# Patient Record
Sex: MALE | Race: BLACK OR AFRICAN AMERICAN | Employment: OTHER | URBAN - METROPOLITAN AREA
[De-identification: names, ages, dates, MRNs, and addresses within clinical notes are randomized per-mention and may not be internally consistent; named-entity substitution may affect disease eponyms.]

---

## 2017-08-02 ENCOUNTER — HOSPITAL ENCOUNTER (EMERGENCY)
Age: 57
Discharge: HOME | End: 2017-08-02
Attending: EMERGENCY MEDICINE
Payer: COMMERCIAL

## 2017-08-02 ENCOUNTER — APPOINTMENT (OUTPATIENT)
Dept: GENERAL RADIOLOGY | Age: 57
End: 2017-08-02
Attending: EMERGENCY MEDICINE
Payer: COMMERCIAL

## 2017-08-02 VITALS
BODY MASS INDEX: 33.86 KG/M2 | DIASTOLIC BLOOD PRESSURE: 88 MMHG | WEIGHT: 250 LBS | HEART RATE: 67 BPM | SYSTOLIC BLOOD PRESSURE: 177 MMHG | OXYGEN SATURATION: 98 % | HEIGHT: 72 IN | RESPIRATION RATE: 16 BRPM | TEMPERATURE: 97.9 F

## 2017-08-02 DIAGNOSIS — M54.41 ACUTE BILATERAL LOW BACK PAIN WITH BILATERAL SCIATICA: Primary | ICD-10-CM
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
## 2017-08-02 DIAGNOSIS — R03.0 ELEVATED BLOOD PRESSURE READING: ICD-10-CM

## 2017-08-02 DIAGNOSIS — M54.42 ACUTE BILATERAL LOW BACK PAIN WITH BILATERAL SCIATICA: Primary | ICD-10-CM

## 2017-08-02 PROCEDURE — 74011250636 HC RX REV CODE- 250/636

## 2017-08-02 PROCEDURE — 72100 X-RAY EXAM L-S SPINE 2/3 VWS: CPT

## 2017-08-02 PROCEDURE — 99283 EMERGENCY DEPT VISIT LOW MDM: CPT

## 2017-08-02 PROCEDURE — 96372 THER/PROPH/DIAG INJ SC/IM: CPT

## 2017-08-02 PROCEDURE — 74011250637 HC RX REV CODE- 250/637: Performed by: PHYSICIAN ASSISTANT

## 2017-08-02 RX ORDER — METHYLPREDNISOLONE 4 MG/1
TABLET ORAL
COMMUNITY
End: 2018-01-19

## 2017-08-02 RX ORDER — PREDNISONE 20 MG/1
60 TABLET ORAL DAILY
Qty: 9 TAB | Refills: 0 | Status: SHIPPED | OUTPATIENT
Start: 2017-08-02 | End: 2017-08-02

## 2017-08-02 RX ORDER — HYDROCODONE BITARTRATE AND ACETAMINOPHEN 5; 325 MG/1; MG/1
1 TABLET ORAL
Qty: 15 TAB | Refills: 0 | Status: SHIPPED | OUTPATIENT
Start: 2017-08-02 | End: 2018-01-19

## 2017-08-02 RX ORDER — DIAZEPAM 5 MG/1
5 TABLET ORAL
Status: COMPLETED | OUTPATIENT
Start: 2017-08-02 | End: 2017-08-02

## 2017-08-02 RX ORDER — DICLOFENAC SODIUM 30 MG/G
GEL TOPICAL 2 TIMES DAILY
Qty: 100 G | Refills: 0 | Status: SHIPPED | OUTPATIENT
Start: 2017-08-02 | End: 2018-01-19

## 2017-08-02 RX ORDER — ONDANSETRON 4 MG/1
4 TABLET, ORALLY DISINTEGRATING ORAL
Status: COMPLETED | OUTPATIENT
Start: 2017-08-02 | End: 2017-08-02

## 2017-08-02 RX ORDER — MORPHINE SULFATE 2 MG/ML
2 INJECTION, SOLUTION INTRAMUSCULAR; INTRAVENOUS
Status: COMPLETED | OUTPATIENT
Start: 2017-08-02 | End: 2017-08-02

## 2017-08-02 RX ORDER — POLYETHYLENE GLYCOL 3350 17 G/17G
17 POWDER, FOR SOLUTION ORAL DAILY
Qty: 119 G | Refills: 0 | Status: SHIPPED | OUTPATIENT
Start: 2017-08-02 | End: 2017-08-02

## 2017-08-02 RX ORDER — POLYETHYLENE GLYCOL 3350 17 G/17G
17 POWDER, FOR SOLUTION ORAL DAILY
Qty: 119 G | Refills: 0 | Status: SHIPPED | OUTPATIENT
Start: 2017-08-02 | End: 2017-08-05

## 2017-08-02 RX ORDER — METHOCARBAMOL 500 MG/1
1000 TABLET, FILM COATED ORAL 4 TIMES DAILY
COMMUNITY
End: 2018-01-19

## 2017-08-02 RX ORDER — MORPHINE SULFATE 2 MG/ML
INJECTION, SOLUTION INTRAMUSCULAR; INTRAVENOUS
Status: COMPLETED
Start: 2017-08-02 | End: 2017-08-02

## 2017-08-02 RX ORDER — PREDNISONE 20 MG/1
60 TABLET ORAL DAILY
Qty: 9 TAB | Refills: 0 | Status: SHIPPED | OUTPATIENT
Start: 2017-08-02 | End: 2017-08-05

## 2017-08-02 RX ADMIN — DIAZEPAM 5 MG: 5 TABLET ORAL at 11:06

## 2017-08-02 RX ADMIN — MORPHINE SULFATE 2 MG: 2 INJECTION, SOLUTION INTRAMUSCULAR; INTRAVENOUS at 11:06

## 2017-08-02 RX ADMIN — ONDANSETRON 4 MG: 4 TABLET, ORALLY DISINTEGRATING ORAL at 11:06
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
## 2017-08-02 NOTE — Clinical Note
Rest, ice/cool compresses several times daily x 2 days, then alternate ice/moist heat, gentle stretching, massage. Avoid prolonged sitting. Follow up with primary care for recheck. Contact information provided for Orthopedist if symptoms persist.  Ret urn to the Emergency Dept for any continued/worsening pain.

## 2018-01-16 ENCOUNTER — APPOINTMENT (OUTPATIENT)
Dept: GENERAL RADIOLOGY | Age: 58
DRG: 176 | End: 2018-01-16
Attending: EMERGENCY MEDICINE
Payer: COMMERCIAL

## 2018-01-16 ENCOUNTER — HOSPITAL ENCOUNTER (INPATIENT)
Age: 58
LOS: 3 days | Discharge: HOME | DRG: 176 | End: 2018-01-19
Attending: INTERNAL MEDICINE | Admitting: GENERAL ACUTE CARE HOSPITAL
Payer: COMMERCIAL

## 2018-01-16 ENCOUNTER — APPOINTMENT (OUTPATIENT)
Dept: CT IMAGING | Age: 58
DRG: 176 | End: 2018-01-16
Attending: EMERGENCY MEDICINE
Payer: COMMERCIAL

## 2018-01-16 DIAGNOSIS — I48.92 ATRIAL FLUTTER, UNSPECIFIED TYPE (HCC): ICD-10-CM

## 2018-01-16 DIAGNOSIS — I26.99 OTHER ACUTE PULMONARY EMBOLISM WITHOUT ACUTE COR PULMONALE (HCC): Primary | ICD-10-CM

## 2018-01-16 LAB
ALBUMIN SERPL-MCNC: 3.2 G/DL (ref 3.5–5)
ALBUMIN/GLOB SERPL: 0.8 {RATIO} (ref 1.1–2.2)
ALP SERPL-CCNC: 105 U/L (ref 45–117)
ALT SERPL-CCNC: 27 U/L (ref 12–78)
ANION GAP SERPL CALC-SCNC: 6 MMOL/L (ref 5–15)
AST SERPL-CCNC: 17 U/L (ref 15–37)
BASOPHILS # BLD: 0 K/UL (ref 0–0.1)
BASOPHILS NFR BLD: 0 % (ref 0–1)
BILIRUB SERPL-MCNC: 0.3 MG/DL (ref 0.2–1)
BNP SERPL-MCNC: 320 PG/ML (ref 0–125)
BUN SERPL-MCNC: 11 MG/DL (ref 6–20)
BUN/CREAT SERPL: 10 (ref 12–20)
CALCIUM SERPL-MCNC: 8.7 MG/DL (ref 8.5–10.1)
CHLORIDE SERPL-SCNC: 106 MMOL/L (ref 97–108)
CK SERPL-CCNC: 123 U/L (ref 39–308)
CO2 SERPL-SCNC: 28 MMOL/L (ref 21–32)
CREAT SERPL-MCNC: 1.07 MG/DL (ref 0.7–1.3)
D DIMER PPP FEU-MCNC: 2.3 MG/L FEU (ref 0–0.65)
EOSINOPHIL # BLD: 0.1 K/UL (ref 0–0.4)
EOSINOPHIL NFR BLD: 1 % (ref 0–7)
ERYTHROCYTE [DISTWIDTH] IN BLOOD BY AUTOMATED COUNT: 12.8 % (ref 11.5–14.5)
GLOBULIN SER CALC-MCNC: 4 G/DL (ref 2–4)
GLUCOSE SERPL-MCNC: 123 MG/DL (ref 65–100)
HCT VFR BLD AUTO: 42.8 % (ref 36.6–50.3)
HGB BLD-MCNC: 13.9 G/DL (ref 12.1–17)
INR PPP: 1 (ref 0.9–1.1)
LYMPHOCYTES # BLD: 2.7 K/UL (ref 0.8–3.5)
LYMPHOCYTES NFR BLD: 36 % (ref 12–49)
MAGNESIUM SERPL-MCNC: 1.9 MG/DL (ref 1.6–2.4)
MCH RBC QN AUTO: 28.6 PG (ref 26–34)
MCHC RBC AUTO-ENTMCNC: 32.5 G/DL (ref 30–36.5)
MCV RBC AUTO: 88.1 FL (ref 80–99)
MONOCYTES # BLD: 1 K/UL (ref 0–1)
MONOCYTES NFR BLD: 14 % (ref 5–13)
NEUTS SEG # BLD: 3.6 K/UL (ref 1.8–8)
NEUTS SEG NFR BLD: 49 % (ref 32–75)
PLATELET # BLD AUTO: 333 K/UL (ref 150–400)
POTASSIUM SERPL-SCNC: 3.8 MMOL/L (ref 3.5–5.1)
PROT SERPL-MCNC: 7.2 G/DL (ref 6.4–8.2)
PROTHROMBIN TIME: 10.1 SEC (ref 9–11.1)
RBC # BLD AUTO: 4.86 M/UL (ref 4.1–5.7)
SODIUM SERPL-SCNC: 140 MMOL/L (ref 136–145)
TROPONIN I SERPL-MCNC: <0.04 NG/ML
TSH SERPL DL<=0.05 MIU/L-ACNC: 1.21 UIU/ML (ref 0.36–3.74)
WBC # BLD AUTO: 7.3 K/UL (ref 4.1–11.1)

## 2018-01-16 PROCEDURE — 36415 COLL VENOUS BLD VENIPUNCTURE: CPT | Performed by: EMERGENCY MEDICINE

## 2018-01-16 PROCEDURE — 80053 COMPREHEN METABOLIC PANEL: CPT | Performed by: EMERGENCY MEDICINE

## 2018-01-16 PROCEDURE — 83735 ASSAY OF MAGNESIUM: CPT | Performed by: EMERGENCY MEDICINE

## 2018-01-16 PROCEDURE — 74011250636 HC RX REV CODE- 250/636: Performed by: EMERGENCY MEDICINE

## 2018-01-16 PROCEDURE — 82550 ASSAY OF CK (CPK): CPT | Performed by: EMERGENCY MEDICINE

## 2018-01-16 PROCEDURE — 99285 EMERGENCY DEPT VISIT HI MDM: CPT

## 2018-01-16 PROCEDURE — 84443 ASSAY THYROID STIM HORMONE: CPT | Performed by: EMERGENCY MEDICINE

## 2018-01-16 PROCEDURE — 65660000000 HC RM CCU STEPDOWN

## 2018-01-16 PROCEDURE — 85025 COMPLETE CBC W/AUTO DIFF WBC: CPT | Performed by: EMERGENCY MEDICINE

## 2018-01-16 PROCEDURE — 85610 PROTHROMBIN TIME: CPT | Performed by: EMERGENCY MEDICINE

## 2018-01-16 PROCEDURE — 74011636320 HC RX REV CODE- 636/320: Performed by: EMERGENCY MEDICINE

## 2018-01-16 PROCEDURE — 96372 THER/PROPH/DIAG INJ SC/IM: CPT

## 2018-01-16 PROCEDURE — 71275 CT ANGIOGRAPHY CHEST: CPT

## 2018-01-16 PROCEDURE — 93005 ELECTROCARDIOGRAM TRACING: CPT

## 2018-01-16 PROCEDURE — 71046 X-RAY EXAM CHEST 2 VIEWS: CPT

## 2018-01-16 PROCEDURE — 85379 FIBRIN DEGRADATION QUANT: CPT | Performed by: EMERGENCY MEDICINE

## 2018-01-16 PROCEDURE — 84484 ASSAY OF TROPONIN QUANT: CPT | Performed by: EMERGENCY MEDICINE

## 2018-01-16 PROCEDURE — 94761 N-INVAS EAR/PLS OXIMETRY MLT: CPT

## 2018-01-16 PROCEDURE — 83880 ASSAY OF NATRIURETIC PEPTIDE: CPT | Performed by: EMERGENCY MEDICINE

## 2018-01-16 RX ORDER — ENOXAPARIN SODIUM 100 MG/ML
1 INJECTION SUBCUTANEOUS EVERY 12 HOURS
Status: DISCONTINUED | OUTPATIENT
Start: 2018-01-17 | End: 2018-01-19 | Stop reason: HOSPADM

## 2018-01-16 RX ORDER — SODIUM CHLORIDE 0.9 % (FLUSH) 0.9 %
10 SYRINGE (ML) INJECTION
Status: COMPLETED | OUTPATIENT
Start: 2018-01-16 | End: 2018-01-16

## 2018-01-16 RX ORDER — SODIUM CHLORIDE 9 MG/ML
50 INJECTION, SOLUTION INTRAVENOUS
Status: COMPLETED | OUTPATIENT
Start: 2018-01-16 | End: 2018-01-16

## 2018-01-16 RX ORDER — ENOXAPARIN SODIUM 100 MG/ML
1 INJECTION SUBCUTANEOUS
Status: COMPLETED | OUTPATIENT
Start: 2018-01-16 | End: 2018-01-16

## 2018-01-16 RX ADMIN — IOPAMIDOL 80 ML: 755 INJECTION, SOLUTION INTRAVENOUS at 21:42

## 2018-01-16 RX ADMIN — Medication 10 ML: at 21:42

## 2018-01-16 RX ADMIN — ENOXAPARIN SODIUM 120 MG: 60 INJECTION SUBCUTANEOUS at 22:51

## 2018-01-16 RX ADMIN — SODIUM CHLORIDE 50 ML/HR: 900 INJECTION, SOLUTION INTRAVENOUS at 21:41

## 2018-01-16 NOTE — IP AVS SNAPSHOT
[address] [facility] 
[phone] Patient: Valerie Carroll. MRN: [id number] WHA:[birth year] A check maru indicates which time of day the medication should be taken. My Medications START taking these medications Instructions Each Dose to Equal  
 Morning Noon Evening Bedtime  
 apixaban 5 mg tablet Commonly known as:  Dulcy Legacy Your last dose was: Your next dose is:    
   
   
 2 tabs Every 12 Hours for 7 Days then 1 tab every 12 hours afterward  
     
   
   
   
  
 metoprolol tartrate 50 mg tablet Commonly known as:  LOPRESSOR Your last dose was: Your next dose is: Take 1 Tab by mouth every twelve (12) hours. 50 mg  
    
   
   
   
  
  
STOP taking these medications   
 diclofenac 3 % topical gel Commonly known as:  Kayli Phan HYDROcodone-acetaminophen 5-325 mg per tablet Commonly known as:  NORCO  
   
  
 methocarbamol 500 mg tablet Commonly known as:  ROBAXIN  
   
  
 methylPREDNISolone 4 mg tablet Commonly known as:  Glo Vincent Where to Get Your Medications Information on where to get these meds will be given to you by the nurse or doctor. ! Ask your nurse or doctor about these medications  
  apixaban 5 mg tablet  
 metoprolol tartrate 50 mg tablet

## 2018-01-16 NOTE — IP AVS SNAPSHOT
[address] [facility] 
[phone] Patient: Yamileth Akhtar. MRN: [id number] ULH:6[birth year]08/[birth year]76 About your hospitalization You were admitted on:  January 16, 2018 You last received care in the:  [facility] 2 PROGRESSIVE CARE You were discharged on:  January 19, 2018 Why you were hospitalized Your primary diagnosis was:  Not on File Your diagnoses also included:  Pulmonary Embolism (Hcc), Atrial Flutter (Hcc) Follow-up Information Follow up With Details Comments Contact Info Jaime Melendez MD On 2/19/2018 11;00am  Hematology/Oncology -  Pulmonary Embolism [address] [facility] [address] [facility] 
[phone] Hailey Sánchez NP On 1/31/2018 9;00am  hospital follow-up [address] [address] 
[phone] Danetet Zaman MD Schedule an appointment as soon as possible for a visit 1-2 weeks [address] [place] [address] 
[phone] Your Scheduled Appointments Wednesday January 31, 2018  9:00 AM EST TRANSITIONAL CARE MANAGEMENT with Hailey Sánchez NP [address]) 17[address] [facility] 7 [address] [phone] Thursday February 08, 2018  3:30 PM EST  
ESTABLISHED PATIENT with Danette Zaman MD  
[facility] [address] [place] [address]) [address]  
[phone] Monday February 19, 2018 11:00 AM EST New Patient with aJime Melendez MD  
8564 [facility] Oncology at [facility]) [address] [facility] [address] [facility]  
[phone] Discharge Orders None A check maru indicates which time of day the medication should be taken. My Medications START taking these medications  Instructions Each Dose to Equal  
 Morning Noon Evening Bedtime  
 apixaban 5 mg tablet Commonly known as:  Roney Blackburn Your last dose was: Your next dose is:    
   
   
 2 tabs Every 12 Hours for 7 Days then 1 tab every 12 hours afterward  
     
   
   
   
  
 metoprolol tartrate 50 mg tablet Commonly known as:  LOPRESSOR Your last dose was: Your next dose is: Take 1 Tab by mouth every twelve (12) hours. 50 mg  
    
   
   
   
  
  
STOP taking these medications   
 diclofenac 3 % topical gel Commonly known as:  Pastora Marques HYDROcodone-acetaminophen 5-325 mg per tablet Commonly known as:  NORCO  
   
  
 methocarbamol 500 mg tablet Commonly known as:  ROBAXIN  
   
  
 methylPREDNISolone 4 mg tablet Commonly known as:  Lieutenant Blank Where to Get Your Medications Information on where to get these meds will be given to you by the nurse or doctor. ! Ask your nurse or doctor about these medications  
  apixaban 5 mg tablet  
 metoprolol tartrate 50 mg tablet Discharge Instructions HOSPITALIST DISCHARGE INSTRUCTIONS 
 
NAME: Duane Gain. :  1960 MRN:  [id number] Date/Time:  2018 8:53 AM 
 
ADMIT DATE: 2018 DISCHARGE DATE: 2018 DISCHARGE DIAGNOSIS: 
Large Right Saddle PE New onset A. Flutter MEDICATIONS: 
· It is important that you take the medication exactly as they are prescribed. · Keep your medication in the bottles provided by the pharmacist and keep a list of the medication names, dosages, and times to be taken in your wallet. · Do not take other medications without consulting your doctor. Pain Management: per above medications What to do at [facility] Recommended diet:  Resume previous diet Recommended activity: Activity as tolerated If you have questions regarding the hospital related prescriptions or hospital related issues please call Howie Lynn at . If you experience any of the following symptoms then please call your primary care physician or return to the emergency room if you cannot get hold of your doctor: 
Fever, chills, nausea, vomiting, diarrhea, change in mentation, falling, bleeding, shortness of breath Information obtained by : 
I understand that if any problems occur once I am at home I am to contact my physician. I understand and acknowledge receipt of the instructions indicated above. Physician's or R.N.'s Signature                                                                  Date/Time Patient or Representative Signature                                                          Date/Time [employer] Announcement We are excited to announce that we are making your provider's discharge notes available to you in [employer]. You will see these notes when they are completed and signed by the physician that discharged you from your recent hospital stay. If you have any questions or concerns about any information you see in [employer], please call the Health Information Department where you were seen or reach out to your Primary Care Provider for more information about your plan of care. Introducing [facility]! Brooklynn Lyon introduces [employer] patient portal. Now you can access parts of your medical record, email your doctor's office, and request medication refills online. 1. In your internet browser, go to [email or web address] 2. Click on the First Time User? Click Here link in the Sign In box. You will see the New Member Sign Up page. 3. Enter your [employer] Access Code exactly as it appears below. You will not need to use this code after youve completed the sign-up process. If you do not sign up before the expiration date, you must request a new code. · [employer] Access Code: [email or web address] Expires: 4/19/2018  9:27 AM 
 
4. Enter the last four digits of your Social Security Number (xxxx) and Date of Birth (mm/dd/yyyy) as indicated and click Submit. You will be taken to the next sign-up page. 5. Create a [employer] ID. This will be your [employer] login ID and cannot be changed, so think of one that is secure and easy to remember. 6. Create a [employer] password. You can change your password at any time. 7. Enter your Password Reset Question and Answer. This can be used at a later time if you forget your password. 8. Enter your e-mail address. You will receive e-mail notification when new information is available in [address] 9. Click Sign Up. You can now view and download portions of your medical record. 10. Click the Download Summary menu link to download a portable copy of your medical information. If you have questions, please visit the Frequently Asked Questions section of the [employer] website. Remember, [employer] is NOT to be used for urgent needs. For medical emergencies, dial 911. Now available from your iPhone and Android! Providers Seen During Your Hospitalization Provider Specialty Primary office phone Mónica Nunez MD Internal Medicine [phone] Cleopatra Ulloa MD Internal Medicine [phone] Your Primary Care Physician (PCP) Primary Care Physician Office Phone Office Fax NONE ** None ** ** None ** You are allergic to the following Allergen Reactions Ibuprofen Other (comments) Increase B/P Recent Documentation Height Weight BMI Smoking Status 1.803 m 115.8 kg 35.61 kg/m2 Never Smoker Emergency Contacts Name Discharge Info Relation Home Work Mobile Jacques Orozco CAREGIVER [3] Spouse [3] [phone] Patient Belongings The following personal items are in your possession at time of discharge: 
  Dental Appliances: None  Visual Aid: None      Home Medications: None   Jewelry: None  Clothing: Footwear, Hat, Pants, Shirt    Other Valuables: [employer] Please provide this summary of care documentation to your next provider. Signatures-by signing, you are acknowledging that this After Visit Summary has been reviewed with you and you have received a copy. Patient Signature:  ____________________________________________________________ Date:  ____________________________________________________________  
  
Carlene Sleeper Provider Signature:  ____________________________________________________________ Date:  ____________________________________________________________

## 2018-01-17 ENCOUNTER — APPOINTMENT (OUTPATIENT)
Dept: ULTRASOUND IMAGING | Age: 58
DRG: 176 | End: 2018-01-17
Attending: GENERAL ACUTE CARE HOSPITAL
Payer: COMMERCIAL

## 2018-01-17 LAB
ALBUMIN SERPL-MCNC: 3.1 G/DL (ref 3.5–5)
ALBUMIN/GLOB SERPL: 0.8 {RATIO} (ref 1.1–2.2)
ALP SERPL-CCNC: 103 U/L (ref 45–117)
ALT SERPL-CCNC: 27 U/L (ref 12–78)
ANION GAP SERPL CALC-SCNC: 8 MMOL/L (ref 5–15)
AST SERPL-CCNC: 18 U/L (ref 15–37)
ATRIAL RATE: 109 BPM
BASOPHILS # BLD: 0 K/UL (ref 0–0.1)
BASOPHILS NFR BLD: 0 % (ref 0–1)
BILIRUB SERPL-MCNC: 0.5 MG/DL (ref 0.2–1)
BUN SERPL-MCNC: 10 MG/DL (ref 6–20)
BUN/CREAT SERPL: 9 (ref 12–20)
CALCIUM SERPL-MCNC: 9.3 MG/DL (ref 8.5–10.1)
CALCULATED R AXIS, ECG10: -81 DEGREES
CALCULATED T AXIS, ECG11: -56 DEGREES
CHLORIDE SERPL-SCNC: 105 MMOL/L (ref 97–108)
CO2 SERPL-SCNC: 26 MMOL/L (ref 21–32)
CREAT SERPL-MCNC: 1.13 MG/DL (ref 0.7–1.3)
DIAGNOSIS, 93000: NORMAL
EOSINOPHIL # BLD: 0.1 K/UL (ref 0–0.4)
EOSINOPHIL NFR BLD: 1 % (ref 0–7)
ERYTHROCYTE [DISTWIDTH] IN BLOOD BY AUTOMATED COUNT: 12.9 % (ref 11.5–14.5)
GLOBULIN SER CALC-MCNC: 4.1 G/DL (ref 2–4)
GLUCOSE SERPL-MCNC: 207 MG/DL (ref 65–100)
HCT VFR BLD AUTO: 42 % (ref 36.6–50.3)
HGB BLD-MCNC: 13.8 G/DL (ref 12.1–17)
LYMPHOCYTES # BLD: 3.1 K/UL (ref 0.8–3.5)
LYMPHOCYTES NFR BLD: 40 % (ref 12–49)
MCH RBC QN AUTO: 28.6 PG (ref 26–34)
MCHC RBC AUTO-ENTMCNC: 32.9 G/DL (ref 30–36.5)
MCV RBC AUTO: 87 FL (ref 80–99)
MONOCYTES # BLD: 0.9 K/UL (ref 0–1)
MONOCYTES NFR BLD: 11 % (ref 5–13)
NEUTS SEG # BLD: 3.8 K/UL (ref 1.8–8)
NEUTS SEG NFR BLD: 48 % (ref 32–75)
P-R INTERVAL, ECG05: 186 MS
PLATELET # BLD AUTO: 353 K/UL (ref 150–400)
POTASSIUM SERPL-SCNC: 3.8 MMOL/L (ref 3.5–5.1)
PROT SERPL-MCNC: 7.2 G/DL (ref 6.4–8.2)
Q-T INTERVAL, ECG07: 344 MS
QRS DURATION, ECG06: 132 MS
QTC CALCULATION (BEZET), ECG08: 463 MS
RBC # BLD AUTO: 4.83 M/UL (ref 4.1–5.7)
SODIUM SERPL-SCNC: 139 MMOL/L (ref 136–145)
TROPONIN I SERPL-MCNC: 0.04 NG/ML
VENTRICULAR RATE, ECG03: 109 BPM
WBC # BLD AUTO: 8 K/UL (ref 4.1–11.1)

## 2018-01-17 PROCEDURE — 85025 COMPLETE CBC W/AUTO DIFF WBC: CPT | Performed by: GENERAL ACUTE CARE HOSPITAL

## 2018-01-17 PROCEDURE — 80053 COMPREHEN METABOLIC PANEL: CPT | Performed by: GENERAL ACUTE CARE HOSPITAL

## 2018-01-17 PROCEDURE — 36415 COLL VENOUS BLD VENIPUNCTURE: CPT | Performed by: GENERAL ACUTE CARE HOSPITAL

## 2018-01-17 PROCEDURE — 74011250636 HC RX REV CODE- 250/636: Performed by: GENERAL ACUTE CARE HOSPITAL

## 2018-01-17 PROCEDURE — 65660000000 HC RM CCU STEPDOWN

## 2018-01-17 PROCEDURE — 93970 EXTREMITY STUDY: CPT

## 2018-01-17 PROCEDURE — 84484 ASSAY OF TROPONIN QUANT: CPT | Performed by: GENERAL ACUTE CARE HOSPITAL

## 2018-01-17 RX ORDER — SODIUM CHLORIDE 0.9 % (FLUSH) 0.9 %
5-10 SYRINGE (ML) INJECTION AS NEEDED
Status: DISCONTINUED | OUTPATIENT
Start: 2018-01-17 | End: 2018-01-19 | Stop reason: HOSPADM

## 2018-01-17 RX ORDER — SODIUM CHLORIDE 0.9 % (FLUSH) 0.9 %
5-10 SYRINGE (ML) INJECTION EVERY 8 HOURS
Status: DISCONTINUED | OUTPATIENT
Start: 2018-01-17 | End: 2018-01-19 | Stop reason: HOSPADM

## 2018-01-17 RX ADMIN — Medication 10 ML: at 21:05

## 2018-01-17 RX ADMIN — ENOXAPARIN SODIUM 120 MG: 60 INJECTION SUBCUTANEOUS at 13:23

## 2018-01-17 RX ADMIN — ENOXAPARIN SODIUM 120 MG: 60 INJECTION SUBCUTANEOUS at 21:02

## 2018-01-17 RX ADMIN — Medication 10 ML: at 07:48

## 2018-01-17 RX ADMIN — Medication 10 ML: at 14:00

## 2018-01-17 NOTE — PROGRESS NOTES
Hospitalist Progress Note    NAME: Valentín Curtis. :  1960   MRN:  [id number]     Assessment / Plan:  Large Right Saddle PE  ? Provoked. Pt reported accident 5 months ago and was in rehab for 3 months and claims to be more mobile for past couple of months. He noted legs swelling few months ago  Continue Therapeutic Lovenox  Ask CM consult for Eliquis pricing  I have discussed pros and cons for various anticoagulations  Heme consult pending  Awaiting 2D echo  LE Venous dopplers negative    New onset A. Flutter  In settings of PE  Continue tele monitoring  Currently rate controlled  May need OP cardiology consult  On anticoagulation as above  Awaiting 2D echo  TSH ok    Code Status: Full Code  Surrogate Decision Maker: Wife  DVT Prophylaxis: Therapeutic Lovenox  GI Prophylaxis: not indicated  Baseline: Independent                              Subjective: Pt seen and examined at bedside. NAD, Feels the same. Overnight events d/w RN     CHIEF COMPLAINT: f/u \"ACOSTA\"     Review of Systems:  Symptom Y/N Comments  Symptom Y/N Comments   Fever/Chills n   Chest Pain y pleuritic   Poor Appetite    Edema     Cough n   Abdominal Pain n    Sputum    Joint Pain     SOB/ACOSTA y   Pruritis/Rash     Nausea/vomit    Tolerating PT/OT     Diarrhea    Tolerating Diet y    Constipation    Other       Could NOT obtain due to:      Objective:     VITALS:   Last 24hrs VS reviewed since prior progress note.  Most recent are:  Patient Vitals for the past 24 hrs:   Temp Pulse Resp BP SpO2   18 1501 98.3 °F (36.8 °C) 84 24 (!) 154/99 98 %   18 1400 - (!) 104 27 129/89 97 %   18 1300 - (!) 109 27 144/82 97 %   18 1200 - 88 21 141/89 97 %   18 1100 - (!) 109 25 147/87 97 %   18 1030 - 93 28 (!) 130/98 96 %   18 1000 - (!) 104 24 140/87 95 %   18 0900 - (!) 115 25 138/86 98 %   18 0830 - 94 22 (!) 138/97 98 %   18 0800 - 92 22 127/90 97 %   18 0530 - 92 19 (!) 132/98 97 % 01/17/18 0500 - 91 19 (!) 134/96 96 %   01/17/18 0430 - 90 19 (!) 137/93 97 %   01/17/18 0400 - 99 23 (!) 135/103 96 %   01/17/18 0330 - 96 22 (!) 139/97 97 %   01/17/18 0300 - 92 20 (!) 143/96 96 %   01/17/18 0230 - 91 19 (!) 144/93 97 %   01/17/18 0200 - 91 24 (!) 162/100 96 %   01/17/18 0130 - 91 24 (!) 157/98 97 %   01/17/18 0100 - 97 26 (!) 149/103 98 %   01/17/18 0030 - 96 25 (!) 141/94 96 %   01/17/18 0001 - 90 22 (!) 161/116 98 %   01/16/18 2330 - 93 20 (!) 176/119 99 %   01/16/18 2300 - 91 20 (!) 161/101 97 %   01/16/18 2230 - 90 28 (!) 172/120 98 %   01/16/18 2200 - 90 26 (!) 151/94 99 %   01/16/18 2156 - - - (!) 145/108 -   01/16/18 2000 - 90 18 130/81 97 %   01/16/18 1930 - 96 20 135/88 97 %   01/16/18 1904 - (!) 107 29 - 99 %   01/16/18 1903 98.4 °F (36.9 °C) (!) 107 16 (!) 163/93 100 %   01/16/18 1901 - - - (!) 163/93 -       Intake/Output Summary (Last 24 hours) at 01/17/18 1633  Last data filed at 01/17/18 1300   Gross per 24 hour   Intake                0 ml   Output              500 ml   Net             -500 ml        PHYSICAL EXAM:  General: WD, WN. Alert, cooperative, no acute distress    EENT:  EOMI. Anicteric sclerae. MMM  Resp:  CTA bilaterally, no wheezing or rales. No accessory muscle use  CV:  Regular  rhythm,  No edema  GI:  Soft, Non distended, Non tender.  +Bowel sounds  Neurologic:  Alert and oriented X 3, normal speech,   Psych:   Good insight. Not anxious nor agitated  Skin:  No rashes.   No jaundice    Reviewed most current lab test results and cultures  YES  Reviewed most current radiology test results   YES  Review and summation of old records today    NO  Reviewed patient's current orders and MAR    YES  PMH/SH reviewed - no change compared to H&P  ________________________________________________________________________  Care Plan discussed with:    Comments   Patient y    Family  y At bedside   RN y    Care Manager     Consultant                        Multidiciplinary team rounds were held today with , nursing, pharmacist and clinical coordinator. Patient's plan of care was discussed; medications were reviewed and discharge planning was addressed. ________________________________________________________________________  Total NON critical care TIME:  35 Minutes    Total CRITICAL CARE TIME Spent:   Minutes non procedure based      Comments   >50% of visit spent in counseling and coordination of care     ________________________________________________________________________  Ani Hernandez MD     Procedures: see electronic medical records for all procedures/Xrays and details which were not copied into this note but were reviewed prior to creation of Plan. LABS:  I reviewed today's most current labs and imaging studies.   Pertinent labs include:  Recent Labs      01/17/18   0351  01/16/18   1944   WBC  8.0  7.3   HGB  13.8  13.9   HCT  42.0  42.8   PLT  353  333     Recent Labs      01/17/18   0351  01/16/18   1944   NA  139  140   K  3.8  3.8   CL  105  106   CO2  26  28   GLU  207*  123*   BUN  10  11   CREA  1.13  1.07   CA  9.3  8.7   MG   --   1.9   ALB  3.1*  3.2*   TBILI  0.5  0.3   SGOT  18  17   ALT  27  27   INR   --   1.0       Signed: Ani Hernandez MD
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
## 2018-01-17 NOTE — PROGRESS NOTES
Problem: Falls - Risk of  Goal: *Absence of Falls  Document Sandra Fall Risk and appropriate interventions in the flowsheet.    Outcome: Progressing Towards Goal  Fall Risk Interventions:            Medication Interventions: Evaluate medications/consider consulting pharmacy, Patient to call before getting OOB

## 2018-01-17 NOTE — ED NOTES
Patient resting in bed in position of comfort with call bell in reach, family at bedside. No complaints at this time.

## 2018-01-17 NOTE — ROUTINE PROCESS
TRANSFER - OUT REPORT:    Verbal report given to [employer]. on Kai Angel.  being transferred to [facility](unit) for routine progression of care       Report consisted of patients Situation, Background, Assessment and   Recommendations(SBAR). Information from the following report(s) SBAR, Kardex, ED Summary and MAR was reviewed with the receiving nurse. Lines:   Peripheral IV 01/16/18 Left Hand (Active)   Site Assessment Clean, dry, & intact 1/16/2018  7:52 PM   Phlebitis Assessment 0 1/16/2018  7:52 PM   Infiltration Assessment 0 1/16/2018  7:52 PM   Dressing Status Clean, dry, & intact 1/16/2018  7:52 PM       Peripheral IV 01/16/18 Right Antecubital (Active)   Site Assessment Clean, dry, & intact 1/16/2018  9:02 PM   Phlebitis Assessment 0 1/16/2018  9:02 PM   Infiltration Assessment 0 1/16/2018  9:02 PM   Dressing Status Clean, dry, & intact 1/16/2018  9:02 PM        Opportunity for questions and clarification was provided.       Patient transported with:   Monitor
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
## 2018-01-17 NOTE — PROGRESS NOTES
TRANSFER - IN REPORT:    Verbal report received from Joni Williamson, [facility] (name) on Yamileth Button.  being received from ED (unit) for routine progression of care      Report consisted of patients Situation, Background, Assessment and   Recommendations(SBAR). Information from the following report(s) SBAR, Kardex, [facility] and Recent Results was reviewed with the receiving nurse. Opportunity for questions and clarification was provided. Assessment completed upon patients arrival to unit and care assumed.            Primary Nurse Deysi Hunt RN and Coralee Oppenheim, RN performed a dual skin assessment on this patient No impairment noted  Dilshad score is 23

## 2018-01-17 NOTE — PROGRESS NOTES
Pharmacy  Enoxaparin (Lovenox®) Monitoring/Dosing      Indication: PE     Current Dose: Enoxaparin 120 gm subcutaneously every 12 hours    Creatinine Clearance (mL/min): 80    Current Weight: 113    Labs:  Recent Labs      01/16/18   1944   CREA  1.07   HGB  13.9   PLT  333   INR  1.0     Wt Readings from Last 1 Encounters:   01/16/18 113.4 kg (250 lb)     Ht Readings from Last 1 Encounters:   01/16/18 180.3 cm (71\")       Impression/Plan:   Lovenox 120 mg sc every 12 hr appropriate for PE     Thanks,  Cathi Carrion, PHARMD      [email or web address]/Enoxaparin%20Dose%20Adjustment%20Protocol. pdf

## 2018-01-17 NOTE — ED PROVIDER NOTES
EMERGENCY DEPARTMENT HISTORY AND PHYSICAL EXAM      Date: 1/16/2018  Patient Name: Yamileth Akhtar. History of Presenting Illness     Chief Complaint   Patient presents with    Irregular Heart Beat     reports chest congestion and cough x 4 days. [facility] and was [facility] here d/t new onset A fib/flutter       History Provided By: Patient and EMS    HPI: Yamileth Riddle, 61 y.o. male with PMHx significant for hyperlipidemia / obesity, presents via EMS to the ED after being referred to the ED from [facility] for new onset of atrial flutter. Pt presents with cc of persistent congestion and non-productive cough that has ongoing for the last few days. He complains of an associated episode of SOB that occurred a few days ago while on a walk. EMS reports atrial flutter en route as well. Pt denies any history of longstanding disease or daily medications. Pt endorses taking Tylenol and OTC cough syrup for his symptoms with mild relief. Pt denies any history of evaluation by a cardiologist. He specifically denies leg pain, leg swelling, fever, chills, nausea, vomiting, or CP. PCP: None    There are no other complaints, changes, or physical findings at this time. Current Facility-Administered Medications   Medication Dose Route Frequency Provider Last Rate Last Dose    [START ON 1/17/2018] enoxaparin (LOVENOX) injection 120 mg  1 mg/kg SubCUTAneous Q12H Korin Ram MD         Current Outpatient Prescriptions   Medication Sig Dispense Refill    methocarbamol (ROBAXIN) 500 mg tablet Take 1,000 mg by mouth four (4) times daily.  methylPREDNISolone (MEDROL DOSEPACK) 4 mg tablet Take  by mouth Specific Days and Specific Times.  HYDROcodone-acetaminophen (NORCO) 5-325 mg per tablet Take 1 Tab by mouth every six (6) hours as needed for Pain for up to 15 doses. Max Daily Amount: 4 Tabs. 15 Tab 0    diclofenac (SOLARAZE) 3 % topical gel Apply  to affected area two (2) times a day.  100 g 0 Past History     Past Medical History:  Past Medical History:   Diagnosis Date    Cerumen impaction 10/14/2013    Elevated blood pressure 10/14/2013    Hyperlipidemia with target LDL less than 100 11/26/2013    Obesity, unspecified 10/10/2013    Pulmonary embolism ([facility] [address] 1/16/2018       Past Surgical History:  No past surgical history on file. Family History:  Family History   Problem Relation Age of Onset    Hypertension Mother     Hypertension Father        Social History:  Social History   Substance Use Topics    Smoking status: Never Smoker    Smokeless tobacco: Never Used    Alcohol use No       Allergies: Allergies   Allergen Reactions    Ibuprofen Other (comments)     Increase B/P         Review of Systems   Review of Systems   Constitutional: Negative for chills and fever. HENT: Positive for congestion. Negative for sore throat. Eyes: Negative for visual disturbance. Respiratory: Positive for cough and shortness of breath. Cardiovascular: Negative for chest pain and leg swelling. Gastrointestinal: Negative for abdominal pain, blood in stool, diarrhea and nausea. Endocrine: Negative for polyuria. Genitourinary: Negative for dysuria and testicular pain. Musculoskeletal: Negative for arthralgias, joint swelling and myalgias. Skin: Negative for rash. Allergic/Immunologic: Negative for immunocompromised state. Neurological: Negative for weakness and headaches. Hematological: Does not bruise/bleed easily. Psychiatric/Behavioral: Negative for confusion. Physical Exam   Physical Exam   Constitutional: He is oriented to person, place, and time. He appears well-developed and well-nourished. HENT:   Head: Normocephalic and atraumatic. Moist mucous membranes   Eyes: Conjunctivae are normal. Pupils are equal, round, and reactive to light. Right eye exhibits no discharge. Left eye exhibits no discharge. Neck: Normal range of motion. Neck supple.  No tracheal deviation present. Cardiovascular: Normal heart sounds. No murmur heard. Irregularly irregular rate   Pulmonary/Chest: Effort normal and breath sounds normal. No respiratory distress. He has no wheezes. He has no rales. Abdominal: Soft. Bowel sounds are normal. There is no tenderness. There is no rebound and no guarding. Musculoskeletal: Normal range of motion. He exhibits no tenderness or deformity. Trace edema of bilateral lower extremities   Neurological: He is alert and oriented to person, place, and time. Skin: Skin is warm and dry. No rash noted. No erythema. Psychiatric: His behavior is normal.   Nursing note and vitals reviewed. Diagnostic Study Results     Labs -     Recent Results (from the past 12 hour(s))   EKG, 12 LEAD, INITIAL    Collection Time: 01/16/18  6:56 PM   Result Value Ref Range    Ventricular Rate 109 BPM    Atrial Rate 109 BPM    P-R Interval 186 ms    QRS Duration 132 ms    Q-T Interval 344 ms    QTC Calculation (Bezet) 463 ms    Calculated R Axis -81 degrees    Calculated T Axis -56 degrees    Diagnosis       Sinus tachycardia  Left axis deviation  Nonspecific intraventricular block  T wave abnormality, consider inferior ischemia  No previous ECGs available     CBC WITH AUTOMATED DIFF    Collection Time: 01/16/18  7:44 PM   Result Value Ref Range    WBC 7.3 4.1 - 11.1 K/uL    RBC 4.86 4.10 - 5.70 M/uL    HGB 13.9 12.1 - 17.0 g/dL    HCT 42.8 36.6 - 50.3 %    MCV 88.1 80.0 - 99.0 FL    MCH 28.6 26.0 - 34.0 PG    MCHC 32.5 30.0 - 36.5 g/dL    RDW 12.8 11.5 - 14.5 %    PLATELET 137 862 - 551 K/uL    NEUTROPHILS 49 32 - 75 %    LYMPHOCYTES 36 12 - 49 %    MONOCYTES 14 (H) 5 - 13 %    EOSINOPHILS 1 0 - 7 %    BASOPHILS 0 0 - 1 %    ABS. NEUTROPHILS 3.6 1.8 - 8.0 K/UL    ABS. LYMPHOCYTES 2.7 0.8 - 3.5 K/UL    ABS. MONOCYTES 1.0 0.0 - 1.0 K/UL    ABS. EOSINOPHILS 0.1 0.0 - 0.4 K/UL    ABS.  BASOPHILS 0.0 0.0 - 0.1 K/UL   METABOLIC PANEL, COMPREHENSIVE    Collection Time: 01/16/18  7:44 PM   Result Value Ref Range    Sodium 140 136 - 145 mmol/L    Potassium 3.8 3.5 - 5.1 mmol/L    Chloride 106 97 - 108 mmol/L    CO2 28 21 - 32 mmol/L    Anion gap 6 5 - 15 mmol/L    Glucose 123 (H) 65 - 100 mg/dL    BUN 11 6 - 20 MG/DL    Creatinine 1.07 0.70 - 1.30 MG/DL    BUN/Creatinine ratio 10 (L) 12 - 20      GFR est AA >60 >60 ml/min/1.73m2    GFR est non-AA >60 >60 ml/min/1.73m2    Calcium 8.7 8.5 - 10.1 MG/DL    Bilirubin, total 0.3 0.2 - 1.0 MG/DL    ALT (SGPT) 27 12 - 78 U/L    AST (SGOT) 17 15 - 37 U/L    Alk. phosphatase 105 45 - 117 U/L    Protein, total 7.2 6.4 - 8.2 g/dL    Albumin 3.2 (L) 3.5 - 5.0 g/dL    Globulin 4.0 2.0 - 4.0 g/dL    A-G Ratio 0.8 (L) 1.1 - 2.2     CK W/ REFLX CKMB    Collection Time: 01/16/18  7:44 PM   Result Value Ref Range     39 - 308 U/L   TROPONIN I    Collection Time: 01/16/18  7:44 PM   Result Value Ref Range    Troponin-I, Qt. <0.04 <0.05 ng/mL   PROTHROMBIN TIME + INR    Collection Time: 01/16/18  7:44 PM   Result Value Ref Range    INR 1.0 0.9 - 1.1      Prothrombin time 10.1 9.0 - 11.1 sec   D DIMER    Collection Time: 01/16/18  7:44 PM   Result Value Ref Range    D-dimer 2.30 (H) 0.00 - 0.65 mg/L FEU   MAGNESIUM    Collection Time: 01/16/18  7:44 PM   Result Value Ref Range    Magnesium 1.9 1.6 - 2.4 mg/dL   NT-PRO BNP    Collection Time: 01/16/18  7:44 PM   Result Value Ref Range    NT pro- (H) 0 - 125 PG/ML   TSH 3RD GENERATION    Collection Time: 01/16/18  7:44 PM   Result Value Ref Range    TSH 1.21 0.36 - 3.74 uIU/mL       Radiologic Studies -   CTA CHEST W OR W WO CONT   Final Result      XR CHEST PA LAT   Final Result      DUPLEX LOWER EXT VENOUS BILAT    (Results Pending)     CT Results  (Last 48 hours)               01/16/18 2141  CTA CHEST W OR W WO CONT Final result    Impression:  IMPRESSION: Large saddle type embolism in the right pulmonary artery. No   evidence for peripheral infarction at this time.  This result was relayed to Dr. Mary Jaime by myself at 2157 hours   789               Narrative:  EXAM:  CTA CHEST W OR W WO CONT       INDICATION:   Chest pain, acute, pulmonary embolism (PE) suspected. Chest   congestion and cough for 4 days. New onset atrial fibrillation and flutter. COMPARISON: None. CONTRAST:  80 mL of Isovue-370. TECHNIQUE:    Precontrast  images were obtained to localize the volume for acquisition. Multislice helical CT arteriography was performed from the diaphragm to the   thoracic inlet during uneventful rapid bolus intravenous contrast   administration. Lung and soft tissue windows were generated. Coronal and   sagittal images were generated and 3D post processing consisting of coronal   maximum intensity images was performed. CT dose reduction was achieved through   use of a standardized protocol tailored for this examination and automatic   exposure control for dose modulation. FINDINGS:   The lungs are clear of mass, nodule, airspace disease or edema. There is a large right pulmonary embolism extending into the right lower lobe,   right upper lobe, and to a lesser degree of right middle lobe. There is no mediastinal or hilar adenopathy or mass. The aorta enhances normally   without evidence of aneurysm or dissection. The visualized portions of the upper abdominal organs are normal.               CXR Results  (Last 48 hours)               01/16/18 2022  XR CHEST PA LAT Final result    Impression:  IMPRESSION: No overt CHF. Narrative:  EXAM:  XR CHEST PA LAT       INDICATION:   atrial flutter       COMPARISON: None. FINDINGS: PA and lateral radiographs of the chest demonstrate clear lungs. The   cardiac and mediastinal contours and pulmonary vascularity are remarkable for   mild tortuosity of the descending aorta. The bones and soft tissues are within   normal limits. Cardiac leads are in place.                    Medical Decision Making   I am the first provider for this patient. I reviewed the vital signs, available nursing notes, past medical history, past surgical history, family history and social history. Vital Signs-Reviewed the patient's vital signs. Patient Vitals for the past 12 hrs:   Temp Pulse Resp BP SpO2   01/16/18 2000 - 90 18 130/81 97 %   01/16/18 1930 - 96 20 135/88 97 %   01/16/18 1904 - (!) 107 29 - 99 %   01/16/18 1903 98.4 °F (36.9 °C) (!) 107 16 (!) 163/93 100 %   01/16/18 1901 - - - (!) 163/93 -       Pulse Oximetry Analysis - 99% on RA    EKG interpretation: (Preliminary)  6:56 PM  Rhythm: atrial flutter; and regular . Rate (approx.): 109; Axis: normal; [place] interval: normal; QRS interval: normal ; ST/T wave: lateral ST changes. Written by Overton Ganser, ED Scribe, as dictated by Swapnil Callejas DO. Records Reviewed: Nursing Notes, Old Medical Records and Previous electrocardiograms    Provider Notes (Medical Decision Making):   EKG from outside facility shows atrial flutter with variable conduction and ST segment changes, might be consistent with LVH. Atrial flutter DDx includes electrolyte abnormality, infection, PE, hypertensive heart disease. ED Course:   Initial assessment performed. The patients presenting problems have been discussed, and they are in agreement with the care plan formulated and outlined with them. I have encouraged them to ask questions as they arise throughout their visit. CONSULT NOTE:   9:59 PM  Swapnil Callejas DO spoke with Austyn Urena MD,  Specialty: Radiology  Conveyed CT results, shows large pulmonary embolus extending into all four lobes. Written by Overton Ganser, ED Scribe, as dictated by Swapnil Callejas DO. Progress Note:  10:05 PM  Pt updated on all available results, he agrees with the current plan of care.    Written by SHANITA Adair, as dictated by Swapnil Callejas DO.    CONSULT NOTE:   10:24 PM  Swapnil Callejas DO spoke with Molly Alfaro MD,   Specialty: Hospitalist  Discussed pt's hx, disposition, and available diagnostic and imaging results. Reviewed care plans. Consultant will evaluate pt for admission. Written by SHANITA Hendersonibxavier, as dictated by Bob Contreras DO. Disposition:  10:24 PM  Patient is being admitted to the hospital by Dr. Kali Green. The results of their tests and reasons for their admission have been discussed with them and/or available family. They convey agreement and understanding for the need to be admitted and for their admission diagnosis. Consultation has been made with the inpatient physician specialist for hospitalization. Written by SHANITA Hendersone, as dictated by Bob Contreras DO. PLAN: Admit to Hospitalist    Diagnosis     Clinical Impression:   1. Other acute pulmonary embolism without acute cor pulmonale ([facility] [address]    2. Atrial flutter, unspecified type [facility])        Attestations: This note is prepared by Maksim Brooks, acting as Scribe for Bob Contreras DO. The scribe's documentation has been prepared under my direction and personally reviewed by me in its entirety. I confirm that the note above accurately reflects all work, treatment, procedures, and medical decision making performed by me.   Bob Contreras DO

## 2018-01-17 NOTE — ED NOTES
Care assumed after report from night shift. Pt resting quietly watching tv. No new complaints. Awaiting admission bed. No s/s resp distress or discomfort. Will continue to monitor.

## 2018-01-18 PROBLEM — I48.92 ATRIAL FLUTTER (HCC): Status: ACTIVE | Noted: 2018-01-18

## 2018-01-18 LAB
ANION GAP SERPL CALC-SCNC: 6 MMOL/L (ref 5–15)
BUN SERPL-MCNC: 11 MG/DL (ref 6–20)
BUN/CREAT SERPL: 10 (ref 12–20)
CALCIUM SERPL-MCNC: 9.6 MG/DL (ref 8.5–10.1)
CHLORIDE SERPL-SCNC: 103 MMOL/L (ref 97–108)
CHOLEST SERPL-MCNC: 200 MG/DL
CO2 SERPL-SCNC: 29 MMOL/L (ref 21–32)
CREAT SERPL-MCNC: 1.07 MG/DL (ref 0.7–1.3)
ERYTHROCYTE [DISTWIDTH] IN BLOOD BY AUTOMATED COUNT: 12.8 % (ref 11.5–14.5)
GLUCOSE SERPL-MCNC: 121 MG/DL (ref 65–100)
HCT VFR BLD AUTO: 45.9 % (ref 36.6–50.3)
HDLC SERPL-MCNC: 54 MG/DL
HDLC SERPL: 3.7 {RATIO} (ref 0–5)
HGB BLD-MCNC: 15.3 G/DL (ref 12.1–17)
LDLC SERPL CALC-MCNC: 125.4 MG/DL (ref 0–100)
LIPID PROFILE,FLP: ABNORMAL
MCH RBC QN AUTO: 29.9 PG (ref 26–34)
MCHC RBC AUTO-ENTMCNC: 33.3 G/DL (ref 30–36.5)
MCV RBC AUTO: 89.6 FL (ref 80–99)
PLATELET # BLD AUTO: 381 K/UL (ref 150–400)
POTASSIUM SERPL-SCNC: 4.1 MMOL/L (ref 3.5–5.1)
RBC # BLD AUTO: 5.12 M/UL (ref 4.1–5.7)
SODIUM SERPL-SCNC: 138 MMOL/L (ref 136–145)
TRIGL SERPL-MCNC: 103 MG/DL (ref ?–150)
VLDLC SERPL CALC-MCNC: 20.6 MG/DL
WBC # BLD AUTO: 9.1 K/UL (ref 4.1–11.1)

## 2018-01-18 PROCEDURE — 85027 COMPLETE CBC AUTOMATED: CPT | Performed by: INTERNAL MEDICINE

## 2018-01-18 PROCEDURE — 36415 COLL VENOUS BLD VENIPUNCTURE: CPT | Performed by: GENERAL ACUTE CARE HOSPITAL

## 2018-01-18 PROCEDURE — 74011250636 HC RX REV CODE- 250/636: Performed by: GENERAL ACUTE CARE HOSPITAL

## 2018-01-18 PROCEDURE — 74011250637 HC RX REV CODE- 250/637: Performed by: NURSE PRACTITIONER

## 2018-01-18 PROCEDURE — 74011250637 HC RX REV CODE- 250/637: Performed by: INTERNAL MEDICINE

## 2018-01-18 PROCEDURE — 93306 TTE W/DOPPLER COMPLETE: CPT

## 2018-01-18 PROCEDURE — 80061 LIPID PANEL: CPT | Performed by: GENERAL ACUTE CARE HOSPITAL

## 2018-01-18 PROCEDURE — 74011000250 HC RX REV CODE- 250: Performed by: INTERNAL MEDICINE

## 2018-01-18 PROCEDURE — 80048 BASIC METABOLIC PNL TOTAL CA: CPT | Performed by: INTERNAL MEDICINE

## 2018-01-18 PROCEDURE — 65660000000 HC RM CCU STEPDOWN

## 2018-01-18 RX ORDER — CARVEDILOL 6.25 MG/1
6.25 TABLET ORAL 2 TIMES DAILY WITH MEALS
Status: DISCONTINUED | OUTPATIENT
Start: 2018-01-18 | End: 2018-01-18

## 2018-01-18 RX ORDER — DILTIAZEM HYDROCHLORIDE 5 MG/ML
10 INJECTION INTRAVENOUS ONCE
Status: COMPLETED | OUTPATIENT
Start: 2018-01-18 | End: 2018-01-18

## 2018-01-18 RX ORDER — CARVEDILOL 3.12 MG/1
3.12 TABLET ORAL 2 TIMES DAILY WITH MEALS
Status: DISCONTINUED | OUTPATIENT
Start: 2018-01-18 | End: 2018-01-18

## 2018-01-18 RX ORDER — METOPROLOL TARTRATE 25 MG/1
25 TABLET, FILM COATED ORAL EVERY 12 HOURS
Status: DISCONTINUED | OUTPATIENT
Start: 2018-01-18 | End: 2018-01-19

## 2018-01-18 RX ADMIN — DILTIAZEM HYDROCHLORIDE 10 MG: 5 INJECTION INTRAVENOUS at 18:49

## 2018-01-18 RX ADMIN — CARVEDILOL 3.12 MG: 3.12 TABLET, FILM COATED ORAL at 10:09

## 2018-01-18 RX ADMIN — METOPROLOL TARTRATE 25 MG: 25 TABLET ORAL at 19:36

## 2018-01-18 RX ADMIN — ENOXAPARIN SODIUM 120 MG: 60 INJECTION SUBCUTANEOUS at 10:09

## 2018-01-18 RX ADMIN — Medication 10 ML: at 06:56

## 2018-01-18 RX ADMIN — Medication 10 ML: at 22:13

## 2018-01-18 RX ADMIN — CARVEDILOL 6.25 MG: 6.25 TABLET, FILM COATED ORAL at 17:45

## 2018-01-18 RX ADMIN — ENOXAPARIN SODIUM 120 MG: 60 INJECTION SUBCUTANEOUS at 22:13

## 2018-01-18 RX ADMIN — Medication 10 ML: at 15:34

## 2018-01-18 NOTE — PROGRESS NOTES
PCP appointment scheduled. FU appointment scheduled   Eliquis information provided to patient. CM tasks are complete. 1600 - Reviewed with Mellody Current. We are waiting on Echo results and patient remains on Lovenox at this time.     Darren Galicia RN CM  Ext [phone]

## 2018-01-18 NOTE — PROGRESS NOTES
Problem: Falls - Risk of  Goal: *Absence of Falls  Document Sandra Fall Risk and appropriate interventions in the flowsheet.    Outcome: Progressing Towards Goal  Fall Risk Interventions:            Medication Interventions: Evaluate medications/consider consulting pharmacy

## 2018-01-18 NOTE — PROGRESS NOTES
PCU SHIFT NURSING NOTE      Bedside and Verbal shift change report given to Efrain Yañez RN (oncoming nurse) by Viviana Jackson RN (offgoing nurse). Report included the following information SBAR, Kardex, MAR, Recent Results and Cardiac Rhythm NSR. Shift Summary:   1050:  Pt up to BR to bathe. Pt HR increased to 176. Sustained in 150's for a few seconds. Pt asymptomatic. Will continue to monitor pt. Admission Date 1/16/2018   Admission Diagnosis Pulmonary embolism ([address]   Consults IP CONSULT TO HEMATOLOGY        Consults   []PT   []OT   []Speech   [x]Case Management      [] Palliative      Cardiac Monitoring Order   [x]Yes   []No     IV drips   []Yes    Drip:                            Dose:  Drip:                            Dose:  Drip:                            Dose:   [x]No     GI Prophylaxis   []Yes   [x]No         DVT Prophylaxis   SCDs:             Edison stockings:         [x] Medication   []Contraindicated   []None      Activity Level Activity Level: Up ad gomez     Activity Assistance: Partial (one person)   Purposeful Rounding every 1-2 hour? [x]Yes   Reddy Score  Total Score: 1   Bed Alarm (If score 3 or >)   []Yes   [] Refused (See signed refusal form in chart)   Dilshad Score  Dilshad Score: 23   Dilshad Score (if score 14 or less)   []PMT consult   []Wound Care consult      []Specialty bed   [] Nutrition consult          Needs prior to discharge:   Home O2 required:    []Yes   [x]No    If yes, how much O2 required?     Other:    Last Bowel Movement: Last Bowel Movement Date: 01/15/18      Influenza Vaccine Received Flu Vaccine for Current Season (usually Sept-March): No    Patient/Guardian Refused (Notify MD): Yes   Pneumonia Vaccine           Diet Active Orders   Diet    DIET REGULAR      LDAs               Peripheral IV 01/16/18 Left Hand (Active)   Site Assessment Clean, dry, & intact 1/18/2018  7:49 AM   Phlebitis Assessment 0 1/18/2018  7:49 AM   Infiltration Assessment 0 1/18/2018  7:49 AM   Dressing Status Clean, dry, & intact 1/18/2018  7:49 AM   Dressing Type Transparent 1/18/2018  7:49 AM   Hub Color/Line Status Green 1/18/2018  7:49 AM       Peripheral IV 01/16/18 Right Antecubital (Active)   Site Assessment Clean, dry, & intact 1/18/2018  7:49 AM   Phlebitis Assessment 0 1/18/2018  7:49 AM   Infiltration Assessment 0 1/18/2018  7:49 AM   Dressing Status Clean, dry, & intact 1/18/2018  7:49 AM   Dressing Type Transparent 1/18/2018  7:49 AM   Hub Color/Line Status Pink 1/18/2018  7:49 AM                      Urinary Catheter      Intake & Output   Date 01/17/18 0700 - 01/18/18 0659 01/18/18 0700 - 01/19/18 0659   Shift 6598-9241 4848-1467 24 Hour Total 5341-7107 8223-3573 24 Hour Total   I  N  T  A  K  E   P.O.  400 400         P. O.  400 400       Shift Total  (mL/kg)  400  (3.5) 400  (3.5)      O  U  T  P  U  T   Urine  (mL/kg/hr) 500  (0.4)  500  (0.2)         Urine Voided 500  500         Urine Occurrence(s)  5 x 5 x       Shift Total  (mL/kg) 500  (4.4)  500  (4.4)      NET -500 400 -100      Weight (kg) 113.4 113.4 113.4 113.4 113.4 113.4         Readmission Risk Assessment Tool Score Low Risk            2       Total Score        2 . Living with Significant Other. Assisted Living. LTAC. SNF. or   Rehab        Criteria that do not apply:    Has Seen PCP in Last 6 Months (Yes=3, No=0)    Patient Length of Stay (>5 days = 3)    IP Visits Last 12 Months (1-3=4, 4=9, >4=11)    Pt.  Coverage (Medicare=5 , Medicaid, or Self-Pay=4)    Charlson Comorbidity Score (Age + Comorbid Conditions)       Expected Length of Stay 2d 21h   Actual Length of Stay 2

## 2018-01-18 NOTE — PROGRESS NOTES
Problem: Falls - Risk of  Goal: *Absence of Falls  Document Sandra Fall Risk and appropriate interventions in the flowsheet.    Outcome: Progressing Towards Goal  Fall Risk Interventions:            Medication Interventions: Evaluate medications/consider consulting pharmacy, Patient to call before getting OOB         History of Falls Interventions: Consult care management for discharge planning, Investigate reason for fall, Room close to nurse's station

## 2018-01-18 NOTE — PROGRESS NOTES
A f./u with Dr Rosa Rangel is scheduled for Feb 19 at 11;00am    A PCP f/u is scheduled with Christal Recinos NP for Jan 31 at 9;00am

## 2018-01-18 NOTE — PROGRESS NOTES
1900--bedside report received from monique lynne. Pt sitting up in bed watching tv. Pt has no complaints at this time call bell in reach assessme t as noted.     0700--bedside report given to monique lynne who is assuming care of pt
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
## 2018-01-18 NOTE — PROGRESS NOTES
8:54 AM cards consult called per Dr. Carlos Flores, Dr. Yana Salazar on call.     9:04 AM Dr. Francisca Dance called to ensure pt stable, aware of consult.

## 2018-01-18 NOTE — PROGRESS NOTES
Cm met with patient to discuss discharge planning. Pt name and  confirmed as pt identifiers. Demographics confirmed. Patient is  and lives in a 2 story home - 23 steps between floors. ADL's/IADL's - independent prior to admission to include steps and driving. DME - none    [facility] -  [facility]    HH/SNF- no previous experience    Wife will provide transportation at time of discharge. Patient is on room air. Care Management Interventions  PCP Verified by CM: No (patient does not have a PCP)  Mode of Transport at Discharge:  Other (see comment) (family)  Transition of Care Consult (CM Consult): Discharge Planning  Discharge Durable Medical Equipment: No  Physical Therapy Consult: No  Occupational Therapy Consult: No  Current Support Network: Lives with Spouse  Confirm Follow Up Transport: Family  Plan discussed with Pt/Family/Caregiver: Yes  Discharge Location  Discharge Placement: [address], [place]  Ext [phone]
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
## 2018-01-18 NOTE — PROGRESS NOTES
CM acknowledged consult for Eliquis. Script will be needed to fax. MD informed. [facility]  # [phone]  Fax #  [phone]    Script received and faxed. 1400 - Script does not require Prior Auth. Patient copay is $153/mo. CM provided Free 30 day trial card and $10 copay card to patient. [facility] Aid application provided to patient.        Darren Galicia RN CM  Ext [phone]

## 2018-01-19 VITALS
RESPIRATION RATE: 18 BRPM | OXYGEN SATURATION: 98 % | WEIGHT: 255.3 LBS | TEMPERATURE: 98.2 F | DIASTOLIC BLOOD PRESSURE: 76 MMHG | HEART RATE: 88 BPM | HEIGHT: 71 IN | SYSTOLIC BLOOD PRESSURE: 135 MMHG | BODY MASS INDEX: 35.74 KG/M2

## 2018-01-19 LAB
ANION GAP SERPL CALC-SCNC: 7 MMOL/L (ref 5–15)
BUN SERPL-MCNC: 11 MG/DL (ref 6–20)
BUN/CREAT SERPL: 11 (ref 12–20)
CALCIUM SERPL-MCNC: 9.1 MG/DL (ref 8.5–10.1)
CHLORIDE SERPL-SCNC: 104 MMOL/L (ref 97–108)
CO2 SERPL-SCNC: 27 MMOL/L (ref 21–32)
CREAT SERPL-MCNC: 1.03 MG/DL (ref 0.7–1.3)
GLUCOSE SERPL-MCNC: 118 MG/DL (ref 65–100)
POTASSIUM SERPL-SCNC: 3.8 MMOL/L (ref 3.5–5.1)
SODIUM SERPL-SCNC: 138 MMOL/L (ref 136–145)

## 2018-01-19 PROCEDURE — 74011250636 HC RX REV CODE- 250/636: Performed by: GENERAL ACUTE CARE HOSPITAL

## 2018-01-19 PROCEDURE — 36415 COLL VENOUS BLD VENIPUNCTURE: CPT | Performed by: INTERNAL MEDICINE

## 2018-01-19 PROCEDURE — 74011250637 HC RX REV CODE- 250/637: Performed by: INTERNAL MEDICINE

## 2018-01-19 PROCEDURE — 80048 BASIC METABOLIC PNL TOTAL CA: CPT | Performed by: INTERNAL MEDICINE

## 2018-01-19 RX ORDER — METOPROLOL TARTRATE 50 MG/1
50 TABLET ORAL EVERY 12 HOURS
Status: DISCONTINUED | OUTPATIENT
Start: 2018-01-19 | End: 2018-01-19 | Stop reason: HOSPADM

## 2018-01-19 RX ORDER — METOPROLOL TARTRATE 50 MG/1
50 TABLET ORAL EVERY 12 HOURS
Qty: 60 TAB | Refills: 0 | Status: SHIPPED | OUTPATIENT
Start: 2018-01-19 | End: 2018-02-16 | Stop reason: SDUPTHER

## 2018-01-19 RX ADMIN — ENOXAPARIN SODIUM 120 MG: 60 INJECTION SUBCUTANEOUS at 08:59

## 2018-01-19 RX ADMIN — Medication 10 ML: at 06:04

## 2018-01-19 RX ADMIN — METOPROLOL TARTRATE 50 MG: 50 TABLET ORAL at 08:59

## 2018-01-19 NOTE — PROGRESS NOTES
PCU SHIFT NURSING NOTE      Bedside and Verbal shift change report given to Boogie Jasso RN (oncoming nurse) by Carmelo Cunningham RN (offgoing nurse). Report included the following information SBAR, Kardex, ED Summary, Procedure Summary, Intake/Output, MAR, Recent Results, Med Rec Status, Cardiac Rhythm A-Flutter and Alarm Parameters . Shift Summary:    1145--Patient discharged and transported to home by wife. Admission Date 1/16/2018   Admission Diagnosis Pulmonary embolism ([facility] [address]   Consults IP CONSULT TO HEMATOLOGY  IP CONSULT TO CARDIOLOGY        Consults   []PT   []OT   []Speech   [x]Case Management      [] Palliative      Cardiac Monitoring Order   [x]Yes   []No     IV drips   []Yes    Drip:                            Dose:  Drip:                            Dose:  Drip:                            Dose:   [x]No     GI Prophylaxis   []Yes   [x]No         DVT Prophylaxis   SCDs:             Edison stockings:         [x] Medication   []Contraindicated   []None      Activity Level Activity Level: Up ad gomez     Activity Assistance: No assistance needed   Purposeful Rounding every 1-2 hour? [x]Yes   Reddy Score  Total Score: 1   Bed Alarm (If score 3 or >)   []Yes   [] Refused (See signed refusal form in chart)   Dilshad Score  Dilshad Score: 23   Dilshad Score (if score 14 or less)   []PMT consult   []Wound Care consult      []Specialty bed   [] Nutrition consult          Needs prior to discharge:   Home O2 required:    []Yes   [x]No    If yes, how much O2 required?     Other:    Last Bowel Movement: Last Bowel Movement Date: 01/19/18      Influenza Vaccine Received Flu Vaccine for Current Season (usually Sept-March): No    Patient/Guardian Refused (Notify MD): Yes   Pneumonia Vaccine           Diet Active Orders   Diet    DIET REGULAR      LDAs               Peripheral IV 01/16/18 Left Hand (Active)   Site Assessment Clean, dry, & intact 1/19/2018  7:14 AM   Phlebitis Assessment 0 1/19/2018  7:14 AM Infiltration Assessment 0 1/19/2018  7:14 AM   Dressing Status Clean, dry, & intact 1/19/2018  7:14 AM   Dressing Type Tape;Transparent 1/19/2018  7:14 AM   Hub Color/Line Status Green;Capped;Flushed 1/19/2018  7:14 AM   Action Taken Open ports on tubing capped 1/19/2018  7:14 AM   Alcohol Cap Used Yes 1/19/2018  7:14 AM       Peripheral IV 01/16/18 Right Antecubital (Active)   Site Assessment Clean, dry, & intact 1/19/2018  7:14 AM   Phlebitis Assessment 0 1/19/2018  7:14 AM   Infiltration Assessment 0 1/19/2018  7:14 AM   Dressing Status Clean, dry, & intact 1/19/2018  7:14 AM   Dressing Type Tape;Transparent 1/19/2018  7:14 AM   Hub Color/Line Status Pink;Capped;Flushed 1/19/2018  7:14 AM   Action Taken Open ports on tubing capped 1/19/2018  7:14 AM   Alcohol Cap Used Yes 1/19/2018  7:14 AM                      Urinary Catheter      Intake & Output   Date 01/18/18 0700 - 01/19/18 0659 01/19/18 0700 - 01/20/18 0659   Shift 1431-8005 8407-7288 24 Hour Total 0014-8891 1431-7354 24 Hour Total   I  N  T  A  K  E   P.O.  200 200         P. O.  200 200       I.V.  (mL/kg/hr)  2  (0) 2  (0) 0  0      Cardizem Volume  2 2 0  0    Shift Total  (mL/kg)  202  (1.8) 202  (1.8) 0  (0)  0  (0)   O  U  T  P  U  T   Urine  (mL/kg/hr)            Urine Occurrence(s) 6 x 4 x 10 x 2 x  2 x    Stool            Stool Occurrence(s) 2 x 1 x 3 x 1 x  1 x    Shift Total  (mL/kg)         NET  202 202 0  0   Weight (kg) 113.4 113.4 113.4 115.8 115.8 115.8         Readmission Risk Assessment Tool Score Low Risk            2       Total Score        2 . Living with Significant Other. Assisted Living. LTAC. SNF. or   Rehab        Criteria that do not apply:    Has Seen PCP in Last 6 Months (Yes=3, No=0)    Patient Length of Stay (>5 days = 3)    IP Visits Last 12 Months (1-3=4, 4=9, >4=11)    Pt.  Coverage (Medicare=5 , Medicaid, or Self-Pay=4)    Charlson Comorbidity Score (Age + Comorbid Conditions)       Expected Length of Stay 2d 21h Actual Length of Stay 3              Will

## 2018-01-19 NOTE — DISCHARGE INSTRUCTIONS
HOSPITALIST DISCHARGE INSTRUCTIONS    NAME: Paulina Hernandez :  1960   MRN:  [id number]     Date/Time:  2018 8:53 AM    ADMIT DATE: 2018     DISCHARGE DATE: 2018     DISCHARGE DIAGNOSIS:  Large Right Saddle PE  New onset A. Flutter    MEDICATIONS:  · It is important that you take the medication exactly as they are prescribed. · Keep your medication in the bottles provided by the pharmacist and keep a list of the medication names, dosages, and times to be taken in your wallet. · Do not take other medications without consulting your doctor. Pain Management: per above medications    What to do at Home    Recommended diet:  Resume previous diet    Recommended activity: Activity as tolerated    If you have questions regarding the hospital related prescriptions or hospital related issues please call [facility] baldemar Castañeda at [phone]. If you experience any of the following symptoms then please call your primary care physician or return to the emergency room if you cannot get hold of your doctor:  Fever, chills, nausea, vomiting, diarrhea, change in mentation, falling, bleeding, shortness of breath        Information obtained by :  I understand that if any problems occur once I am at home I am to contact my physician. I understand and acknowledge receipt of the instructions indicated above.                                                                                                                                            Physician's or R.N.'s Signature                                                                  Date/Time                                                                                                                                              Patient or Representative Signature                                                          Date/Time
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
## 2018-01-19 NOTE — PROGRESS NOTES
Problem: Falls - Risk of  Goal: *Absence of Falls  Document Sandra Fall Risk and appropriate interventions in the flowsheet.    Outcome: Progressing Towards Goal  Fall Risk Interventions:            Medication Interventions: Bed/chair exit alarm, Evaluate medications/consider consulting pharmacy, Patient to call before getting OOB, Teach patient to arise slowly         History of Falls Interventions: Consult care management for discharge planning

## 2018-01-19 NOTE — PROGRESS NOTES
1/19/2018 7:46 AM    Admit Date: 1/16/2018    Admit Diagnosis: Pulmonary embolism ([facility] [address]    Subjective:     Jet Prost.   denies chest pain, chest pressure/discomfort, dyspnea, palpitations, irregular heart beats, near-syncope. Visit Vitals    /85 (BP 1 Location: Left arm, BP Patient Position: At rest)    Pulse 90    Temp 98.1 °F (36.7 °C)    Resp 18    Ht 5' 11\" (1.803 m)    Wt 255 lb 4.8 oz (115.8 kg)    SpO2 98%    BMI 35.61 kg/m2     Current Facility-Administered Medications   Medication Dose Route Frequency    metoprolol tartrate (LOPRESSOR) tablet 50 mg  50 mg Oral Q12H    sodium chloride (NS) flush 5-10 mL  5-10 mL IntraVENous Q8H    sodium chloride (NS) flush 5-10 mL  5-10 mL IntraVENous PRN    enoxaparin (LOVENOX) injection 120 mg  1 mg/kg SubCUTAneous Q12H         Objective:      Visit Vitals    /85 (BP 1 Location: Left arm, BP Patient Position: At rest)    Pulse 90    Temp 98.1 °F (36.7 °C)    Resp 18    Ht 5' 11\" (1.803 m)    Wt 255 lb 4.8 oz (115.8 kg)    SpO2 98%    BMI 35.61 kg/m2       Physical Exam:  Abdomen: soft, non-tender.  Bowel sounds normal.   Extremities: no cyanosis or edema  Heart: Irregular rate and rhythm, S1, S2 normal, no murmur, click, rub or gallop  Lungs: clear to auscultation bilaterally  Neurologic: Grossly normal    Data Review:   Labs:    Recent Results (from the past 24 hour(s))   METABOLIC PANEL, BASIC    Collection Time: 01/19/18  3:09 AM   Result Value Ref Range    Sodium 138 136 - 145 mmol/L    Potassium 3.8 3.5 - 5.1 mmol/L    Chloride 104 97 - 108 mmol/L    CO2 27 21 - 32 mmol/L    Anion gap 7 5 - 15 mmol/L    Glucose 118 (H) 65 - 100 mg/dL    BUN 11 6 - 20 MG/DL    Creatinine 1.03 0.70 - 1.30 MG/DL    BUN/Creatinine ratio 11 (L) 12 - 20      GFR est AA >60 >60 ml/min/1.73m2    GFR est non-AA >60 >60 ml/min/1.73m2    Calcium 9.1 8.5 - 10.1 MG/DL       Telemetry: tristian tee      Assessment:     Active Problems: Pulmonary embolism ([facility] [address] (1/16/2018)      Atrial flutter ([facility] [address] (1/18/2018)        Plan:     Remains in a. Flutter with controlled rate. Increase lopressor dose. Continue anticoagulation. No further cardiac work up for now. Will follow in office in few weeks. If remains in a. Flutter at f/u then will consider ablation possibility. D/w pt in details. Please call if can be of any further assistance.

## 2018-01-19 NOTE — PROGRESS NOTES
Cardiology folowup noted. Patient instructed to take Eliquis Financial Aid application to this appointment. CM tasks are complete.      Bernardo Sigala RN CM  Ext [phone]

## 2018-01-25 ENCOUNTER — OFFICE VISIT (OUTPATIENT)
Dept: FAMILY MEDICINE CLINIC | Age: 58
End: 2018-01-25

## 2018-01-25 VITALS
HEART RATE: 73 BPM | SYSTOLIC BLOOD PRESSURE: 125 MMHG | OXYGEN SATURATION: 98 % | HEIGHT: 71 IN | TEMPERATURE: 97.3 F | RESPIRATION RATE: 16 BRPM | BODY MASS INDEX: 38.08 KG/M2 | DIASTOLIC BLOOD PRESSURE: 79 MMHG | WEIGHT: 272 LBS

## 2018-01-25 DIAGNOSIS — I48.92 ATRIAL FLUTTER, UNSPECIFIED TYPE (HCC): ICD-10-CM

## 2018-01-25 DIAGNOSIS — Z09 HOSPITAL DISCHARGE FOLLOW-UP: Primary | ICD-10-CM

## 2018-01-25 DIAGNOSIS — I26.92 ACUTE SADDLE PULMONARY EMBOLISM WITHOUT ACUTE COR PULMONALE (HCC): ICD-10-CM

## 2018-01-25 DIAGNOSIS — R20.2 TINGLING OF RIGHT UPPER EXTREMITY: ICD-10-CM

## 2018-01-25 LAB
BACTERIA UA POCT, BACTPOCT: NORMAL
BILIRUB UR QL STRIP: NEGATIVE
CASTS UA POCT: NEGATIVE
CLUE CELLS, CLUEPOCT: NEGATIVE
CRYSTALS UA POCT, CRYSPOCT: NEGATIVE
EPITHELIAL CELLS POCT: NORMAL
GLUCOSE UR-MCNC: NEGATIVE MG/DL
HBA1C MFR BLD HPLC: 6.6 %
KETONES P FAST UR STRIP-MCNC: NEGATIVE MG/DL
MUCUS UA POCT, MUCPOCT: NORMAL
PH UR STRIP: 5 [PH] (ref 4.6–8)
PROT UR QL STRIP: NEGATIVE
RBC UA POCT, RBCPOCT: 0
SP GR UR STRIP: 1.01 (ref 1–1.03)
TRICH UA POCT, TRICHPOC: NEGATIVE
UA UROBILINOGEN AMB POC: NORMAL (ref 0.2–1)
URINALYSIS CLARITY POC: CLEAR
URINALYSIS COLOR POC: YELLOW
URINE BLOOD POC: NORMAL
URINE CULT COMMENT, POCT: NORMAL
URINE LEUKOCYTES POC: NEGATIVE
URINE NITRITES POC: NEGATIVE
WBC UA POCT, WBCPOCT: NORMAL
YEAST UA POCT, YEASTPOC: NEGATIVE

## 2018-01-25 RX ORDER — CYCLOBENZAPRINE HCL 10 MG
10 TABLET ORAL
COMMUNITY
Start: 2017-08-24 | End: 2018-02-05

## 2018-01-25 NOTE — PROGRESS NOTES
Chief Complaint   Patient presents with   [facility] Follow Up     Patient here for follow up for pulmonary embolism. Concerned having tingling in left arm and left leg. Patient states just doesn't  Feel right per patient.

## 2018-01-25 NOTE — PROGRESS NOTES
HISTORY OF PRESENT ILLNESS  Jet Gillespie is a 61 y.o. male. HPI  Patient comes in today for follow up hospitalization. [id number] with large saddle right PE and atrial flutter. States he was out walking and started sweating and having dyspnea. Had gone to [facility], found to be in atrial flutter, referred to ED then found PE. Patient states he had blood in stool 2 days ago, happened twice and has not seen since. Patient is taking Eliquis for PE. Patient complains of numbness and tingling on right leg for 2 days. States he has been sitting around, taking it easy since having blood clot. Patient states this morning, right arm started having tingling sensations. States has been constant, will improve a little when he walks. No unilateral weakness, no speech changes, facial drooping, vision changes, mental status changes. Denies chest pain. Brother is diabetic, mother may be diabetic, unsure. Appointment with cardiology on 2/8/18. Appointment with heme/onc on 2/19/18. Allergies   Allergen Reactions    Ibuprofen Other (comments)     Increase B/P       Past Medical History:   Diagnosis Date    Atrial flutter ([address] 1/18/2018    Atrial flutter ([address] 1/18/2018    Cerumen impaction 10/14/2013    Elevated blood pressure 10/14/2013    Hyperlipidemia with target LDL less than 100 11/26/2013    Obesity, unspecified 10/10/2013    Pulmonary emboli ([address] 01/19/2018    [id number]    Pulmonary embolism ([facility] [address] 1/16/2018       History reviewed. No pertinent surgical history. Social History     Social History    Marital status:      Spouse name: N/A    Number of children: N/A    Years of education: N/A     Occupational History    Not on file.      Social History Main Topics    Smoking status: Never Smoker    Smokeless tobacco: Never Used    Alcohol use No    Drug use: No    Sexual activity: Yes     Partners: Female     Other Topics Concern    Not on file Social History Narrative     to [employer]       Family History   Problem Relation Age of Onset    Hypertension Mother     Hypertension Father        Current Outpatient Prescriptions   Medication Sig    metoprolol tartrate (LOPRESSOR) 50 mg tablet Take 1 Tab by mouth every twelve (12) hours.  apixaban (ELIQUIS) 5 mg tablet 2 tabs Every 12 Hours for 7 Days then 1 tab every 12 hours afterward    cyclobenzaprine (FLEXERIL) 10 mg tablet Take 10 mg by mouth. No current facility-administered medications for this visit. Review of Systems   Constitutional: Negative for chills, diaphoresis, fever, malaise/fatigue and weight loss. HENT: Negative for congestion, ear pain, sore throat and tinnitus. Eyes: Negative for blurred vision and double vision. Respiratory: Negative for cough, sputum production, shortness of breath and wheezing. Cardiovascular: Negative for chest pain, palpitations and leg swelling. Gastrointestinal: Negative for abdominal pain, blood in stool, constipation, diarrhea, nausea and vomiting. Genitourinary: Negative for dysuria, flank pain, frequency, hematuria and urgency. Musculoskeletal: Negative for back pain, joint pain and myalgias. Skin: Negative. Neurological: Positive for sensory change (numbness/tiongling right arm and leg). Negative for dizziness, tingling, speech change, focal weakness and headaches. Psychiatric/Behavioral: Negative for depression. The patient is not nervous/anxious and does not have insomnia. Vitals:    01/25/18 1014   BP: 125/79   Pulse: 73   Resp: 16   Temp: 97.3 °F (36.3 °C)   TempSrc: Oral   SpO2: 98%   Weight: 272 lb (123.4 kg)   Height: 5' 11\" (1.803 m)     Physical Exam   Constitutional: He is oriented to person, place, and time. Vital signs are normal. He appears well-developed and well-nourished. He is cooperative.    HENT:   Right Ear: Hearing, tympanic membrane, external ear and ear canal normal.   Left Ear: Hearing, tympanic membrane, external ear and ear canal normal.   Nose: Nose normal.   Mouth/Throat: Uvula is midline, oropharynx is clear and moist and mucous membranes are normal.   Cardiovascular: Normal rate, regular rhythm and normal heart sounds. Pulmonary/Chest: Effort normal and breath sounds normal.   Abdominal: Soft. Normal appearance and bowel sounds are normal. There is no hepatosplenomegaly. There is no tenderness. There is no CVA tenderness. Lymphadenopathy:        Head (right side): No submental, no submandibular and no tonsillar adenopathy present. Head (left side): No submental, no submandibular and no tonsillar adenopathy present. He has no cervical adenopathy. Neurological: He is alert and oriented to person, place, and time. No cranial nerve deficit (CN II-XII grossly intact) or sensory deficit. He exhibits normal muscle tone. Coordination and gait normal.   Skin: Skin is warm, dry and intact. Psychiatric: He has a normal mood and affect. His behavior is normal. Judgment and thought content normal.     ASSESSMENT and PLAN    ICD-10-CM ICD-9-CM    1. Hospital discharge follow-up Z09 V67.59    2. Acute saddle pulmonary embolism without acute cor pulmonale (HCC) I26.92 415.13    3. Tingling of right upper extremity R20.2 782.0 AMB POC EKG ROUTINE W/ 12 LEADS, INTER & REP      AMB POC HEMOGLOBIN A1C      CBC WITH AUTOMATED DIFF      IRON PROFILE      VITAMIN B12 & FOLATE      VITAMIN D, 25 HYDROXY      AMB POC URINALYSIS DIP STICK AUTO W/ MICRO       METABOLIC PANEL, COMPREHENSIVE   4. Atrial flutter, unspecified type ([address] I48.92 427.32      Encounter Diagnoses   Name Primary?    [facility] discharge follow-up Yes    Acute saddle pulmonary embolism without acute cor pulmonale (HCC)     Tingling of right upper extremity     Atrial flutter, unspecified type ([address]      Orders Placed This Encounter    CBC WITH AUTOMATED DIFF    IRON PROFILE    VITAMIN B12 & FOLATE    VITAMIN D, 25 HYDROXY    METABOLIC PANEL, COMPREHENSIVE    AMB POC HEMOGLOBIN A1C    AMB POC URINALYSIS DIP STICK AUTO W/ MICRO     AMB POC EKG ROUTINE W/ 12 LEADS, INTER & REP    cyclobenzaprine (FLEXERIL) 10 mg tablet     Diagnoses and all orders for this visit:    1. Hospital discharge follow-up    2. Acute saddle pulmonary embolism without acute cor pulmonale (HCC) - on ELiquis. Will be seeing heme/onc on 2/19/18. Patient will be on Eliquis for 6 months minimum. Patient encouraged to gradually increase exercise as tolerated (such as light walking at slow pace, may use stretch bands, light weights. -     I have reviewed/discussed the above normal BMI with the patient. I have recommended the following interventions: dietary management education, guidance, and counseling and encourage exercise . [place] 3. Tingling of right upper extremity - neuro exam unremarkable. Will check labs. Discussed red flags and when to seek urgent care/ED  -     AMB POC EKG ROUTINE W/ 12 LEADS, INTER & REP  -     AMB POC HEMOGLOBIN A1C  -     CBC WITH AUTOMATED DIFF  -     IRON PROFILE  -     VITAMIN B12 & FOLATE  -     VITAMIN D, 25 HYDROXY  -     AMB POC URINALYSIS DIP STICK AUTO W/ MICRO   -     METABOLIC PANEL, COMPREHENSIVE    4. Atrial flutter, unspecified type ([address] - continue metoprolol. Heart rate controlled. Has appointment with cardiology on 2/8/18      Follow-up Disposition:  Return in about 1 month (around 2/25/2018). lab results and schedule of future lab studies reviewed with patient  reviewed diet, exercise and weight control    I have reviewed the patient's allergies and made any necessary changes. Medical, procedural, social and family histories have been reviewed and updated as medically indicated. I have reconciled and/or revised patient medications in the EMR. I have discussed each diagnosis listed in this note with Paulina Godoy and/or their family.  I have discussed treatment options and the risk/benefit analysis of those options, including safe use of medications and possible medication side effects. Through the use of shared decision making we have agreed to the above plan. The patient has received an after-visit summary and questions were answered concerning future plans. Anjali Trevizo, ELIAZAR-C    This note will not be viewable in [employer].

## 2018-01-25 NOTE — PATIENT INSTRUCTIONS
Atrial Flutter: Care Instructions  Your Care Instructions    Atrial flutter is a type of heartbeat problem (arrhythmia) that usually causes a fast heart rate. In atrial flutter, a problem with the heart's electrical system causes the two upper parts of the heart (the right atrium and the left atrium) to flutter, or beat very fast. Atrial flutter might be diagnosed using an an electrocardiogram (EKG). An EKG translates the heart's electrical activity into line tracings on paper. Treating atrial flutter is important for several reasons. The change in heartbeat can cause blood clots. The clots can travel from your heart to your brain and cause a stroke. A fast heartbeat can make you feel lightheaded, dizzy, and weak. And over time, it can also increase your risk for heart failure. Atrial flutter is often the result of another heart condition, such as coronary artery disease or some other heart rhythm problems. Making changes to improve your heart health will help you stay healthy and active. Your doctor may prescribe medicines to help slow down your heartbeat. You may also take medicine to help prevent a stroke. In some cases, a procedure called catheter ablation is done to stop atrial flutter. Follow-up care is a key part of your treatment and safety. Be sure to make and go to all appointments, and call your doctor if you are having problems. It's also a good idea to know your test results and keep a list of the medicines you take. How can you care for yourself at home? Medicines  ? · Take your medicines exactly as prescribed. Call your doctor if you think you are having a problem with your medicine. You will get more details on the specific medicines your doctor prescribes. ? · If your doctor has given you a blood thinner to prevent a stroke, be sure you get instructions about how to take your medicine safely. Blood thinners can cause serious bleeding problems.    ? · Do not take any vitamins, over-the-counter drugs, or herbal products without talking to your doctor first.   ? Lifestyle changes  ? · Do not smoke. Smoking can increase your chance of a stroke and heart attack. If you need help quitting, talk to your doctor about stop-smoking programs and medicines. These can increase your chances of quitting for good. ? · Eat a heart-healthy diet. ? · Stay at a healthy weight. Lose weight if you need to.   ? · Limit alcohol to 2 drinks a day for men and 1 drink a day for women. Too much alcohol can cause health problems. ? · Avoid colds and flu. Get a pneumococcal vaccine shot. If you have had one before, ask your doctor whether you need another dose. Get a flu shot every year. If you must be around people with colds or flu, wash your hands often. Activity  ? · Talk to your doctor about what type and level of exercise is safe for you. Start light exercise if your doctor says it is okay. Walking is a good choice. Try for at least 30 minutes on most days of the week. You also may want to swim, bike, or do other activities. ? · When you exercise, watch for signs that your heart is working too hard. You are pushing too hard if you can't talk while you exercise. If you become short of breath or dizzy or have chest pain, sit down and rest right away. When should you call for help? Call 911 anytime you think you may need emergency care. For example, call if:  ? · You have symptoms of a stroke. These may include:  ¨ Sudden numbness, tingling, weakness, or loss of movement in your face, arm, or leg, especially on only one side of your body. ¨ Sudden vision changes. ¨ Sudden trouble speaking. ¨ Sudden confusion or trouble understanding simple statements. ¨ Sudden problems with walking or balance. ¨ A sudden, severe headache that is different from past headaches. ? · You passed out (lost consciousness).    ?Call your doctor now or seek immediate medical care if:  ? · You have new or increased shortness of breath. ? · You feel dizzy or lightheaded, or you feel like you may faint. ? · Your heart rate becomes irregular. ? · You can feel your heart flutter in your chest or skip heartbeats. Tell your doctor if these symptoms are new or worse. ? Watch closely for changes in your health, and be sure to contact your doctor if you have any problems. Where can you learn more? Go to http://birgit-rashaun.info/. Enter I034 in the search box to learn more about \"Atrial Flutter: Care Instructions. \"  Current as of: September 21, 2016  Content Version: 11.4  © 9685-7234 [employer]. Care instructions adapted under license by [employer] (which disclaims liability or warranty for this information). If you have questions about a medical condition or this instruction, always ask your healthcare professional. [place] [address] any warranty or liability for your use of this information. Pulmonary Embolism: Care Instructions  Your Care Instructions    Pulmonary embolism is the sudden blockage of an artery in the lung. Blood clots in the deep veins of the leg or pelvis (deep vein thrombosis, or DVT) are the most common cause. These blood clots can travel to the lungs. Pulmonary embolism can be very serious. Because you have had one pulmonary embolism, you are at greater risk for having another one. But you can take steps to prevent another pulmonary embolism by following your doctor's instructions. You will probably take a prescription blood-thinning medicine to prevent blood clots. A blood thinner can stop a blood clot from growing larger and prevent new clots from forming. Follow-up care is a key part of your treatment and safety. Be sure to make and go to all appointments, and call your doctor if you are having problems. It's also a good idea to know your test results and keep a list of the medicines you take.   How can you care for yourself at home?  · Take your medicines exactly as prescribed. Call your doctor if you think you are having a problem with your medicine. You will get more details on the specific medicines your doctor prescribes. · If you are taking a blood thinner, be sure you get instructions about how to take your medicine safely. Blood thinners can cause serious bleeding problems. Preventing future pulmonary embolisms  · Exercise. Keep blood moving in your legs to keep clots from forming. If you are traveling by car, stop every hour or so. Get out and walk around for a few minutes. If you are traveling by bus, train, or plane, get out of your seat and walk up and down the aisles every hour or so. You also can do leg exercises while you are seated. Pump your feet up and down by pulling your toes up toward your knees then pointing them down. · Get up out of bed as soon as possible after an illness or surgery. · Do not smoke. If you need help quitting, talk to your doctor about stop-smoking programs and medicines. These can increase your chances of quitting for good. · Check with your doctor before taking hormone or birth control pills. These may increase your risk of blot clots. · Ask your doctor about wearing compression stockings to help prevent blood clots in your legs. There are different types of stockings, and they need to fit right. So your doctor will recommend what you need. When should you call for help? Call 911 anytime you think you may need emergency care. For example, call if:  ? · You have shortness of breath. ? · You have chest pain. ? · You passed out (lost consciousness). ? · You cough up blood. ?Call your doctor now or seek immediate medical care if:  ? · You have new or worsening pain or swelling in your leg. ? Watch closely for changes in your health, and be sure to contact your doctor if:  ? · You do not get better as expected. Where can you learn more?   Go to http://birgit-rashaun.info/. Enter F362 in the search box to learn more about \"Pulmonary Embolism: Care Instructions. \"  Current as of: March 20, 2017  Content Version: 11.4  © 4726-0364 [employer]. Care instructions adapted under license by [employer] (which disclaims liability or warranty for this information). If you have questions about a medical condition or this instruction, always ask your healthcare professional. [place] [address] any warranty or liability for your use of this information.

## 2018-01-25 NOTE — MR AVS SNAPSHOT
[address] [facility], [place], operated by [facility] 
 
 
 [address] [facility] [address] 
[phone] Patient: Glen Longoria MRN: [id number] AZV:[birth year] Visit Information Date & Time Provider Department Dept. Phone Encounter #  
 1/25/2018 10:00 AM Saima Liao [phone] [id number] Follow-up Instructions Return in about 1 month (around 2/25/2018). Your Appointments 2/8/2018  3:30 PM  
ESTABLISHED PATIENT with Virginia Lock MD  
[facility] [address] [facility]) Appt Note: Per Dr. Flor Cavanaugh, see patient 2-3 weeks for hospital f/u-scc01/09/2018  
 [address] [facility]  
[phone] [address] [facility]  
  
    
 2/19/2018 11:00 AM  
New Patient with Petty Ames MD  
[address] [facility]) Appt Note: New pt- hospital fup (PE)  
 [address] [facility] [id number]  
[address] [facility] [address] [facility] [address] Yanick June Upcoming Health Maintenance Date Due Hepatitis C Screening 1960 COLONOSCOPY 8/23/1977 DTaP/Tdap/Td series (1 - Tdap) 8/23/1980 Influenza Age 5 to Adult 8/1/2017 Allergies as of 1/25/2018  Review Complete On: 1/25/2018 By: Monika Delaney LPN Severity Noted Reaction Type Reactions Ibuprofen  10/10/2013   Side Effect Other (comments) Increase B/P Current Immunizations  Never Reviewed No immunizations on file. Not reviewed this visit You Were Diagnosed With   
  
 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 
ICD-9-CM: 415.13 Atrial flutter, unspecified type ([facility] [address]     ICD-10-CM: I48.92 
ICD-9-CM: 427.32 Vitals BP Pulse Temp Resp Height(growth percentile) Weight(growth percentile) 125/79 (BP 1 Location: Left arm, BP Patient Position: Sitting) 73 97.3 °F (36.3 °C) (Oral) 16 5' 11\" (1.803 m) 272 lb (123.4 kg) SpO2 BMI Smoking Status 98% 37.94 kg/m2 Never Smoker BMI and BSA Data Body Mass Index Body Surface Area  
 37.94 kg/m 2 2.49 m 2 Preferred Pharmacy Pharmacy Name Phone Maryam Justin [facility]  [place] [phone] Your Updated Medication List  
  
   
This list is accurate as of: 1/25/18 11:33 AM.  Always use your most recent med list.  
  
  
  
  
 apixaban 5 mg tablet Commonly known as:  ELIQUIS  
2 tabs Every 12 Hours for 7 Days then 1 tab every 12 hours afterward  
  
 cyclobenzaprine 10 mg tablet Commonly known as:  FLEXERIL Take 10 mg by mouth.  
  
 metoprolol tartrate 50 mg tablet Commonly known as:  LOPRESSOR Take 1 Tab by mouth every twelve (12) hours. We Performed the Following AMB POC EKG ROUTINE W/ 12 LEADS, INTER & REP [87304 CPT(R)] AMB POC HEMOGLOBIN A1C [19331 CPT(R)] AMB POC URINALYSIS DIP STICK AUTO W/ MICRO  [52013 CPT(R)] CBC WITH AUTOMATED DIFF [29715 CPT(R)] IRON PROFILE [id number] CPT(R)] METABOLIC PANEL, COMPREHENSIVE [28874 CPT(R)] VITAMIN B12 & FOLATE [17187 CPT(R)] VITAMIN D, 25 HYDROXY [id number] CPT(R)] Follow-up Instructions Return in about 1 month (around 2/25/2018). Patient Instructions Atrial Flutter: Care Instructions Your Care Instructions Atrial flutter is a type of heartbeat problem (arrhythmia) that usually causes a fast heart rate.  In atrial flutter, a problem with the heart's electrical system causes the two upper parts of the heart (the right atrium and the left atrium) to flutter, or beat very fast. Atrial flutter might be diagnosed using an an electrocardiogram (EKG). An EKG translates the heart's electrical activity into line tracings on paper. Treating atrial flutter is important for several reasons. The change in heartbeat can cause blood clots. The clots can travel from your heart to your brain and cause a stroke. A fast heartbeat can make you feel lightheaded, dizzy, and weak. And over time, it can also increase your risk for heart failure. Atrial flutter is often the result of another heart condition, such as coronary artery disease or some other heart rhythm problems. Making changes to improve your heart health will help you stay healthy and active. Your doctor may prescribe medicines to help slow down your heartbeat. You may also take medicine to help prevent a stroke. In some cases, a procedure called catheter ablation is done to stop atrial flutter. Follow-up care is a key part of your treatment and safety. Be sure to make and go to all appointments, and call your doctor if you are having problems. It's also a good idea to know your test results and keep a list of the medicines you take. How can you care for yourself at home? Medicines ? · Take your medicines exactly as prescribed. Call your doctor if you think you are having a problem with your medicine. You will get more details on the specific medicines your doctor prescribes. ? · If your doctor has given you a blood thinner to prevent a stroke, be sure you get instructions about how to take your medicine safely. Blood thinners can cause serious bleeding problems. ? · Do not take any vitamins, over-the-counter drugs, or herbal products without talking to your doctor first. ? Lifestyle changes ? · Do not smoke. Smoking can increase your chance of a stroke and heart attack. If you need help quitting, talk to your doctor about stop-smoking programs and medicines. These can increase your chances of quitting for good. ? · Eat a heart-healthy diet. ? · Stay at a healthy weight. Lose weight if you need to.  
? · Limit alcohol to 2 drinks a day for men and 1 drink a day for women. Too much alcohol can cause health problems. ? · Avoid colds and flu. Get a pneumococcal vaccine shot. If you have had one before, ask your doctor whether you need another dose. Get a flu shot every year. If you must be around people with colds or flu, wash your hands often. Activity ? · Talk to your doctor about what type and level of exercise is safe for you. Start light exercise if your doctor says it is okay. Walking is a good choice. Try for at least 30 minutes on most days of the week. You also may want to swim, bike, or do other activities. ? · When you exercise, watch for signs that your heart is working too hard. You are pushing too hard if you can't talk while you exercise. If you become short of breath or dizzy or have chest pain, sit down and rest right away. When should you call for help? Call 911 anytime you think you may need emergency care. For example, call if: 
? · You have symptoms of a stroke. These may include: 
¨ Sudden numbness, tingling, weakness, or loss of movement in your face, arm, or leg, especially on only one side of your body. ¨ Sudden vision changes. ¨ Sudden trouble speaking. ¨ Sudden confusion or trouble understanding simple statements. ¨ Sudden problems with walking or balance. ¨ A sudden, severe headache that is different from past headaches. ? · You passed out (lost consciousness). ?Call your doctor now or seek immediate medical care if: 
? · You have new or increased shortness of breath. ? · You feel dizzy or lightheaded, or you feel like you may faint. ? · Your heart rate becomes irregular. ? · You can feel your heart flutter in your chest or skip heartbeats. Tell your doctor if these symptoms are new or worse. ? Watch closely for changes in your health, and be sure to contact your doctor if you have any problems. Where can you learn more? Go to http://birgit-rashaun.info/. Enter L274 in the search box to learn more about \"Atrial Flutter: Care Instructions. \" Current as of: September 21, 2016 Content Version: 11.4 © 4364-1868 "[employer]". Care instructions adapted under license by [employer] (which disclaims liability or warranty for this information). If you have questions about a medical condition or this instruction, always ask your healthcare professional. [address] any warranty or liability for your use of this information. Pulmonary Embolism: Care Instructions Your Care Instructions Pulmonary embolism is the sudden blockage of an artery in the lung. Blood clots in the deep veins of the leg or pelvis (deep vein thrombosis, or DVT) are the most common cause. These blood clots can travel to the lungs. Pulmonary embolism can be very serious. Because you have had one pulmonary embolism, you are at greater risk for having another one. But you can take steps to prevent another pulmonary embolism by following your doctor's instructions. You will probably take a prescription blood-thinning medicine to prevent blood clots. A blood thinner can stop a blood clot from growing larger and prevent new clots from forming. Follow-up care is a key part of your treatment and safety. Be sure to make and go to all appointments, and call your doctor if you are having problems. It's also a good idea to know your test results and keep a list of the medicines you take. How can you care for yourself at home? · Take your medicines exactly as prescribed. Call your doctor if you think you are having a problem with your medicine. You will get more details on the specific medicines your doctor prescribes. · If you are taking a blood thinner, be sure you get instructions about how to take your medicine safely. Blood thinners can cause serious bleeding problems. Preventing future pulmonary embolisms · Exercise. Keep blood moving in your legs to keep clots from forming. If you are traveling by car, stop every hour or so. Get out and walk around for a few minutes. If you are traveling by bus, train, or plane, get out of your seat and walk up and down the aisles every hour or so. You also can do leg exercises while you are seated. Pump your feet up and down by pulling your toes up toward your knees then pointing them down. · Get up out of bed as soon as possible after an illness or surgery. · Do not smoke. If you need help quitting, talk to your doctor about stop-smoking programs and medicines. These can increase your chances of quitting for good. · Check with your doctor before taking hormone or birth control pills. These may increase your risk of blot clots. · Ask your doctor about wearing compression stockings to help prevent blood clots in your legs. There are different types of stockings, and they need to fit right. So your doctor will recommend what you need. When should you call for help? Call 911 anytime you think you may need emergency care. For example, call if: 
? · You have shortness of breath. ? · You have chest pain. ? · You passed out (lost consciousness). ? · You cough up blood. ?Call your doctor now or seek immediate medical care if: 
? · You have new or worsening pain or swelling in your leg. ? Watch closely for changes in your health, and be sure to contact your doctor if: 
? · You do not get better as expected. Where can you learn more? Go to http://birgit-rashaun.info/. Enter X080 in the search box to learn more about \"Pulmonary Embolism: Care Instructions. \" Current as of: March 20, 2017 Content Version: 11.4 © 9934-2314 [employer].  Care instructions adapted under license by [employer] (which disclaims liability or warranty for this information). If you have questions about a medical condition or this instruction, always ask your healthcare professional. [address] any warranty or liability for your use of this information. Introducing \[facility]\"" & [facility] SERVICES! Ami Rowe introduces CallAround patient portal. Now you can access parts of your medical record, email your doctor's office, and request medication refills online. 1. In your internet browser, go to [email or web address] 2. Click on the First Time User? Click Here link in the Sign In box. You will see the New Member Sign Up page. 3. Enter your CallAround Access Code exactly as it appears below. You will not need to use this code after youve completed the sign-up process. If you do not sign up before the expiration date, you must request a new code. · CallAround Access Code: [email or web address] Expires: 4/19/2018  9:27 AM 
 
4. Enter the last four digits of your Social Security Number (xxxx) and Date of Birth (mm/dd/yyyy) as indicated and click Submit. You will be taken to the next sign-up page. 5. Create a CallAround ID. This will be your CallAround login ID and cannot be changed, so think of one that is secure and easy to remember. 6. Create a CallAround password. You can change your password at any time. 7. Enter your Password Reset Question and Answer. This can be used at a later time if you forget your password. 8. Enter your e-mail address. You will receive e-mail notification when new information is available in [address] 9. Click Sign Up. You can now view and download portions of your medical record. 10. Click the Download Summary menu link to download a portable copy of your medical information. If you have questions, please visit the Frequently Asked Questions section of the CallAround website. Remember, CallAround is NOT to be used for urgent needs. For medical emergencies, dial 911. Now available from your iPhone and Android! Please provide this summary of care documentation to your next provider. Your primary care clinician is listed as NONE. If you have any questions after today's visit, please call [phone].

## 2018-01-26 LAB
25(OH)D3+25(OH)D2 SERPL-MCNC: 17.3 NG/ML (ref 30–100)
ALBUMIN SERPL-MCNC: 4.5 G/DL (ref 3.5–5.5)
ALBUMIN/GLOB SERPL: 1.3 {RATIO} (ref 1.2–2.2)
ALP SERPL-CCNC: 116 IU/L (ref 39–117)
ALT SERPL-CCNC: 34 IU/L (ref 0–44)
AST SERPL-CCNC: 17 IU/L (ref 0–40)
BASOPHILS # BLD AUTO: 0 X10E3/UL (ref 0–0.2)
BASOPHILS NFR BLD AUTO: 0 %
BILIRUB SERPL-MCNC: 0.4 MG/DL (ref 0–1.2)
BUN SERPL-MCNC: 15 MG/DL (ref 6–24)
BUN/CREAT SERPL: 14 (ref 9–20)
CALCIUM SERPL-MCNC: 10.2 MG/DL (ref 8.7–10.2)
CHLORIDE SERPL-SCNC: 98 MMOL/L (ref 96–106)
CO2 SERPL-SCNC: 24 MMOL/L (ref 18–29)
CREAT SERPL-MCNC: 1.07 MG/DL (ref 0.76–1.27)
EOSINOPHIL # BLD AUTO: 0 X10E3/UL (ref 0–0.4)
EOSINOPHIL NFR BLD AUTO: 1 %
ERYTHROCYTE [DISTWIDTH] IN BLOOD BY AUTOMATED COUNT: 13.3 % (ref 12.3–15.4)
FOLATE SERPL-MCNC: 14 NG/ML
GFR SERPLBLD CREATININE-BSD FMLA CKD-EPI: 76 ML/MIN/1.73
GFR SERPLBLD CREATININE-BSD FMLA CKD-EPI: 88 ML/MIN/1.73
GLOBULIN SER CALC-MCNC: 3.4 G/DL (ref 1.5–4.5)
GLUCOSE SERPL-MCNC: 131 MG/DL (ref 65–99)
HCT VFR BLD AUTO: 47.3 % (ref 37.5–51)
HGB BLD-MCNC: 15.1 G/DL (ref 13–17.7)
IMM GRANULOCYTES # BLD: 0 X10E3/UL (ref 0–0.1)
IMM GRANULOCYTES NFR BLD: 0 %
IRON SATN MFR SERPL: 22 % (ref 15–55)
IRON SERPL-MCNC: 58 UG/DL (ref 38–169)
LYMPHOCYTES # BLD AUTO: 3 X10E3/UL (ref 0.7–3.1)
LYMPHOCYTES NFR BLD AUTO: 38 %
MCH RBC QN AUTO: 28.8 PG (ref 26.6–33)
MCHC RBC AUTO-ENTMCNC: 31.9 G/DL (ref 31.5–35.7)
MCV RBC AUTO: 90 FL (ref 79–97)
MONOCYTES # BLD AUTO: 0.7 X10E3/UL (ref 0.1–0.9)
MONOCYTES NFR BLD AUTO: 9 %
NEUTROPHILS # BLD AUTO: 4 X10E3/UL (ref 1.4–7)
NEUTROPHILS NFR BLD AUTO: 52 %
PLATELET # BLD AUTO: 516 X10E3/UL (ref 150–379)
POTASSIUM SERPL-SCNC: 4.9 MMOL/L (ref 3.5–5.2)
PROT SERPL-MCNC: 7.9 G/DL (ref 6–8.5)
RBC # BLD AUTO: 5.25 X10E6/UL (ref 4.14–5.8)
SODIUM SERPL-SCNC: 140 MMOL/L (ref 134–144)
TIBC SERPL-MCNC: 263 UG/DL (ref 250–450)
UIBC SERPL-MCNC: 205 UG/DL (ref 111–343)
VIT B12 SERPL-MCNC: 547 PG/ML (ref 232–1245)
WBC # BLD AUTO: 7.8 X10E3/UL (ref 3.4–10.8)

## 2018-01-29 ENCOUNTER — TELEPHONE (OUTPATIENT)
Dept: FAMILY MEDICINE CLINIC | Age: 58
End: 2018-01-29

## 2018-01-29 DIAGNOSIS — E55.9 VITAMIN D DEFICIENCY: ICD-10-CM

## 2018-01-29 DIAGNOSIS — E11.9 CONTROLLED TYPE 2 DIABETES MELLITUS WITHOUT COMPLICATION, WITHOUT LONG-TERM CURRENT USE OF INSULIN (HCC): Primary | ICD-10-CM

## 2018-01-29 RX ORDER — METFORMIN HYDROCHLORIDE 500 MG/1
500 TABLET ORAL 2 TIMES DAILY WITH MEALS
Qty: 60 TAB | Refills: 5 | Status: SHIPPED | OUTPATIENT
Start: 2018-01-29 | End: 2018-02-05

## 2018-01-29 RX ORDER — ERGOCALCIFEROL 1.25 MG/1
50000 CAPSULE ORAL
Qty: 4 CAP | Refills: 2 | Status: SHIPPED | OUTPATIENT
Start: 2018-01-29 | End: 2018-05-01 | Stop reason: ALTCHOICE

## 2018-02-05 ENCOUNTER — HOSPITAL ENCOUNTER (EMERGENCY)
Age: 58
Discharge: HOME | End: 2018-02-05
Attending: EMERGENCY MEDICINE
Payer: COMMERCIAL

## 2018-02-05 ENCOUNTER — APPOINTMENT (OUTPATIENT)
Dept: GENERAL RADIOLOGY | Age: 58
End: 2018-02-05
Attending: EMERGENCY MEDICINE
Payer: COMMERCIAL

## 2018-02-05 ENCOUNTER — OFFICE VISIT (OUTPATIENT)
Dept: ONCOLOGY | Age: 58
End: 2018-02-05

## 2018-02-05 ENCOUNTER — APPOINTMENT (OUTPATIENT)
Dept: CT IMAGING | Age: 58
End: 2018-02-05
Attending: EMERGENCY MEDICINE
Payer: COMMERCIAL

## 2018-02-05 VITALS
HEIGHT: 72 IN | WEIGHT: 272.05 LBS | BODY MASS INDEX: 36.85 KG/M2 | HEART RATE: 94 BPM | SYSTOLIC BLOOD PRESSURE: 140 MMHG | RESPIRATION RATE: 17 BRPM | TEMPERATURE: 97.2 F | DIASTOLIC BLOOD PRESSURE: 75 MMHG | OXYGEN SATURATION: 99 %

## 2018-02-05 DIAGNOSIS — R07.9 CHEST PAIN, UNSPECIFIED TYPE: Primary | ICD-10-CM

## 2018-02-05 DIAGNOSIS — I26.92 ACUTE SADDLE PULMONARY EMBOLISM WITHOUT ACUTE COR PULMONALE (HCC): Primary | ICD-10-CM

## 2018-02-05 LAB
ALBUMIN SERPL-MCNC: 3.8 G/DL (ref 3.5–5)
ALBUMIN/GLOB SERPL: 1 {RATIO} (ref 1.1–2.2)
ALP SERPL-CCNC: 97 U/L (ref 45–117)
ALT SERPL-CCNC: 26 U/L (ref 12–78)
ANION GAP SERPL CALC-SCNC: 5 MMOL/L (ref 5–15)
AST SERPL-CCNC: 16 U/L (ref 15–37)
ATRIAL RATE: 242 BPM
BASOPHILS # BLD: 0 K/UL (ref 0–0.1)
BASOPHILS NFR BLD: 0 % (ref 0–1)
BILIRUB SERPL-MCNC: 0.7 MG/DL (ref 0.2–1)
BNP SERPL-MCNC: 131 PG/ML (ref 0–125)
BUN SERPL-MCNC: 12 MG/DL (ref 6–20)
BUN/CREAT SERPL: 10 (ref 12–20)
CALCIUM SERPL-MCNC: 9.3 MG/DL (ref 8.5–10.1)
CALCULATED P AXIS, ECG09: -77 DEGREES
CALCULATED R AXIS, ECG10: -91 DEGREES
CALCULATED T AXIS, ECG11: 7 DEGREES
CHLORIDE SERPL-SCNC: 104 MMOL/L (ref 97–108)
CO2 SERPL-SCNC: 31 MMOL/L (ref 21–32)
CREAT SERPL-MCNC: 1.18 MG/DL (ref 0.7–1.3)
DIAGNOSIS, 93000: NORMAL
DIFFERENTIAL METHOD BLD: NORMAL
EOSINOPHIL # BLD: 0.1 K/UL (ref 0–0.4)
EOSINOPHIL NFR BLD: 1 % (ref 0–7)
ERYTHROCYTE [DISTWIDTH] IN BLOOD BY AUTOMATED COUNT: 12.6 % (ref 11.5–14.5)
GLOBULIN SER CALC-MCNC: 3.9 G/DL (ref 2–4)
GLUCOSE SERPL-MCNC: 109 MG/DL (ref 65–100)
HCT VFR BLD AUTO: 45.9 % (ref 36.6–50.3)
HGB BLD-MCNC: 14.6 G/DL (ref 12.1–17)
IMM GRANULOCYTES # BLD: 0 K/UL (ref 0–0.04)
IMM GRANULOCYTES NFR BLD AUTO: 0 % (ref 0–0.5)
LIPASE SERPL-CCNC: 93 U/L (ref 73–393)
LYMPHOCYTES # BLD: 2.8 K/UL (ref 0.8–3.5)
LYMPHOCYTES NFR BLD: 44 % (ref 12–49)
MAGNESIUM SERPL-MCNC: 2.1 MG/DL (ref 1.6–2.4)
MCH RBC QN AUTO: 28.5 PG (ref 26–34)
MCHC RBC AUTO-ENTMCNC: 31.8 G/DL (ref 30–36.5)
MCV RBC AUTO: 89.5 FL (ref 80–99)
MONOCYTES # BLD: 0.6 K/UL (ref 0–1)
MONOCYTES NFR BLD: 10 % (ref 5–13)
NEUTS SEG # BLD: 2.8 K/UL (ref 1.8–8)
NEUTS SEG NFR BLD: 44 % (ref 32–75)
NRBC # BLD: 0 K/UL (ref 0–0.01)
NRBC BLD-RTO: 0 PER 100 WBC
PLATELET # BLD AUTO: 293 K/UL (ref 150–400)
PMV BLD AUTO: 9.4 FL (ref 8.9–12.9)
POTASSIUM SERPL-SCNC: 3.9 MMOL/L (ref 3.5–5.1)
PROT SERPL-MCNC: 7.7 G/DL (ref 6.4–8.2)
Q-T INTERVAL, ECG07: 374 MS
QRS DURATION, ECG06: 92 MS
QTC CALCULATION (BEZET), ECG08: 484 MS
RBC # BLD AUTO: 5.13 M/UL (ref 4.1–5.7)
SODIUM SERPL-SCNC: 140 MMOL/L (ref 136–145)
TROPONIN I SERPL-MCNC: <0.04 NG/ML
VENTRICULAR RATE, ECG03: 101 BPM
WBC # BLD AUTO: 6.3 K/UL (ref 4.1–11.1)

## 2018-02-05 PROCEDURE — 83735 ASSAY OF MAGNESIUM: CPT | Performed by: EMERGENCY MEDICINE

## 2018-02-05 PROCEDURE — 74011000250 HC RX REV CODE- 250: Performed by: EMERGENCY MEDICINE

## 2018-02-05 PROCEDURE — 84484 ASSAY OF TROPONIN QUANT: CPT | Performed by: EMERGENCY MEDICINE

## 2018-02-05 PROCEDURE — 83880 ASSAY OF NATRIURETIC PEPTIDE: CPT | Performed by: EMERGENCY MEDICINE

## 2018-02-05 PROCEDURE — 99284 EMERGENCY DEPT VISIT MOD MDM: CPT

## 2018-02-05 PROCEDURE — 36415 COLL VENOUS BLD VENIPUNCTURE: CPT | Performed by: EMERGENCY MEDICINE

## 2018-02-05 PROCEDURE — 74011250636 HC RX REV CODE- 250/636: Performed by: EMERGENCY MEDICINE

## 2018-02-05 PROCEDURE — 85025 COMPLETE CBC W/AUTO DIFF WBC: CPT | Performed by: EMERGENCY MEDICINE

## 2018-02-05 PROCEDURE — 71045 X-RAY EXAM CHEST 1 VIEW: CPT

## 2018-02-05 PROCEDURE — 93005 ELECTROCARDIOGRAM TRACING: CPT

## 2018-02-05 PROCEDURE — 80053 COMPREHEN METABOLIC PANEL: CPT | Performed by: EMERGENCY MEDICINE

## 2018-02-05 PROCEDURE — 83690 ASSAY OF LIPASE: CPT | Performed by: EMERGENCY MEDICINE

## 2018-02-05 PROCEDURE — 74011250637 HC RX REV CODE- 250/637: Performed by: EMERGENCY MEDICINE

## 2018-02-05 PROCEDURE — 74011636320 HC RX REV CODE- 636/320: Performed by: EMERGENCY MEDICINE

## 2018-02-05 PROCEDURE — 71275 CT ANGIOGRAPHY CHEST: CPT

## 2018-02-05 RX ORDER — FAMOTIDINE 20 MG/1
20 TABLET, FILM COATED ORAL 2 TIMES DAILY
Qty: 28 TAB | Refills: 0 | Status: SHIPPED | OUTPATIENT
Start: 2018-02-05 | End: 2018-02-19

## 2018-02-05 RX ORDER — SODIUM CHLORIDE 0.9 % (FLUSH) 0.9 %
10 SYRINGE (ML) INJECTION
Status: COMPLETED | OUTPATIENT
Start: 2018-02-05 | End: 2018-02-05

## 2018-02-05 RX ORDER — GUAIFENESIN 100 MG/5ML
243 LIQUID (ML) ORAL
Status: COMPLETED | OUTPATIENT
Start: 2018-02-05 | End: 2018-02-05

## 2018-02-05 RX ORDER — SODIUM CHLORIDE 9 MG/ML
50 INJECTION, SOLUTION INTRAVENOUS
Status: COMPLETED | OUTPATIENT
Start: 2018-02-05 | End: 2018-02-05

## 2018-02-05 RX ORDER — NITROGLYCERIN 0.4 MG/1
0.4 TABLET SUBLINGUAL
Status: DISCONTINUED | OUTPATIENT
Start: 2018-02-05 | End: 2018-02-05 | Stop reason: HOSPADM

## 2018-02-05 RX ADMIN — SODIUM CHLORIDE 50 ML/HR: 900 INJECTION, SOLUTION INTRAVENOUS at 15:50

## 2018-02-05 RX ADMIN — ASPIRIN 81 MG 243 MG: 81 TABLET ORAL at 14:59

## 2018-02-05 RX ADMIN — Medication 10 ML: at 15:49

## 2018-02-05 RX ADMIN — IOPAMIDOL 100 ML: 755 INJECTION, SOLUTION INTRAVENOUS at 15:49

## 2018-02-05 RX ADMIN — LIDOCAINE HYDROCHLORIDE 40 ML: 20 SOLUTION ORAL; TOPICAL at 14:56

## 2018-02-05 NOTE — ED PROVIDER NOTES
EMERGENCY DEPARTMENT HISTORY AND PHYSICAL EXAM      Date: 2/5/2018  Patient Name: Luna Galindo    History of Presenting Illness     Chief Complaint   Patient presents with    Irregular Heart Beat     Pt dx with PE and AFIB (new onset) in Jan 2018, dc'd 2 weeks ago on eliquis, pt c/o CP last night which is now resolved, pt no c/o upper chest and neck tightness. History Provided By: Patient    HPI: Luna Galindo, 61 y.o. male with PMHx significant for A-flutter, DM, and HTN, presents ambulatory to the ED with cc of chest pain and troat tightness since last night. Pt reports his pain has been persistent since last night to present with a mild 2/10 steady discomfort. Pt reports the pain initially presented with a moderate intensity with an associated sharp, aching sensation to the mid-sternal chest into the upper chest. Pt informs that the pain radiated mildly upwards into the throat to present with an associated tightening sensation. Pt reports no change to his discomfort with movements and exertion. Pt additionally informs of mild, intermittent palpitations which he describes as fluttering. Pt reports he presents to the ED to be cautious given his history. Of note, pt was seen in the ED on 01/16/2018 with a new diagnosis of A-fib alongside PE and placed on Eliquis. Pt specifically denies any nausea, vomiting, fevers, chills, abdominal pain, leg swelling, headaches, or SOB. PMHx: hyperlipidemia   Social Hx: - EtOH; - Smoker; - Illicit Drugs    PCP: None    There are no other complaints, changes, or physical findings at this time. Current Outpatient Prescriptions   Medication Sig Dispense Refill    famotidine (PEPCID) 20 mg tablet Take 1 Tab by mouth two (2) times a day for 14 days. 28 Tab 0    ergocalciferol (ERGOCALCIFEROL) 50,000 unit capsule Take 1 Cap by mouth every seven (7) days.  Indications: Vitamin D Deficiency 4 Cap 2    metoprolol tartrate (LOPRESSOR) 50 mg tablet Take 1 Tab by mouth every twelve (12) hours. 60 Tab 0    apixaban (ELIQUIS) 5 mg tablet 2 tabs Every 12 Hours for 7 Days then 1 tab every 12 hours afterward 74 Tab 0     Past History     Past Medical History:  Past Medical History:   Diagnosis Date    Atrial flutter ([facility] 1/18/2018    Atrial flutter ([address] 1/18/2018    Cerumen impaction 10/14/2013    Controlled type 2 diabetes mellitus without complication, without long-term current use of insulin ([address]     Controlled type 2 diabetes mellitus without complication, without long-term current use of insulin ([address] 1/29/2018    Elevated blood pressure 10/14/2013    Hyperlipidemia with target LDL less than 100 11/26/2013    Obesity, unspecified 10/10/2013    Pulmonary emboli ([address] 01/19/2018    [facility] -    Pulmonary embolism ([facility] [address] 1/16/2018    Vitamin D deficiency 1/29/2018     Past Surgical History:  No past surgical history on file. Family History:  Family History   Problem Relation Age of Onset    Hypertension Mother     Hypertension Father      Social History:  Social History   Substance Use Topics    Smoking status: Never Smoker    Smokeless tobacco: Never Used    Alcohol use No     Allergies: Allergies   Allergen Reactions    Ibuprofen Other (comments)     Increase B/P     Review of Systems   Review of Systems   Constitutional: Negative for chills and fever. HENT: Negative for congestion and sore throat.         + throat tightness   Eyes: Negative for visual disturbance. Respiratory: Negative for cough and shortness of breath. Cardiovascular: Positive for chest pain and palpitations. Negative for leg swelling. Gastrointestinal: Negative for abdominal pain, blood in stool, diarrhea and nausea. Endocrine: Negative for polyuria. Genitourinary: Negative for dysuria and testicular pain. Musculoskeletal: Negative for arthralgias, joint swelling and myalgias. Skin: Negative for rash. Allergic/Immunologic: Negative for immunocompromised state. Neurological: Negative for weakness and headaches. Hematological: Does not bruise/bleed easily. Psychiatric/Behavioral: Negative for confusion. Physical Exam   Physical Exam   Constitutional: He is oriented to person, place, and time. He appears well-developed and well-nourished. HENT:   Head: Normocephalic and atraumatic. Moist mucous membranes   Eyes: Conjunctivae are normal. Pupils are equal, round, and reactive to light. Right eye exhibits no discharge. Left eye exhibits no discharge. Neck: Normal range of motion. Neck supple. No tracheal deviation present. Cardiovascular: Normal rate and normal heart sounds. An irregularly irregular rhythm present. No murmur heard. Pulmonary/Chest: Effort normal and breath sounds normal. No respiratory distress. He has no wheezes. He has no rales. Abdominal: Soft. Bowel sounds are normal. There is no tenderness. There is no rebound and no guarding. Musculoskeletal: Normal range of motion. He exhibits edema (BLE, trace ). He exhibits no tenderness or deformity. Neurological: He is alert and oriented to person, place, and time. Skin: Skin is warm and dry. No rash noted. No erythema. Psychiatric: His behavior is normal.   Nursing note and vitals reviewed.     Diagnostic Study Results     Labs -     Recent Results (from the past 12 hour(s))   EKG, 12 LEAD, INITIAL    Collection Time: 02/05/18 12:42 PM   Result Value Ref Range    Ventricular Rate 101 BPM    Atrial Rate 242 BPM    QRS Duration 92 ms    Q-T Interval 374 ms    QTC Calculation (Bezet) 484 ms    Calculated P Axis -77 degrees    Calculated R Axis -91 degrees    Calculated T Axis 7 degrees    Diagnosis       Atrial flutter with variable AV block  Right superior axis deviation  Pulmonary disease pattern  ST & T wave abnormality, consider inferolateral ischemia    Confirmed by ENRIQUE Yung ([id number]) on 2/5/2018 5:14:13 PM     CBC WITH AUTOMATED DIFF    Collection Time: 02/05/18  2:12 PM Result Value Ref Range    WBC 6.3 4.1 - 11.1 K/uL    RBC 5.13 4.10 - 5.70 M/uL    HGB 14.6 12.1 - 17.0 g/dL    HCT 45.9 36.6 - 50.3 %    MCV 89.5 80.0 - 99.0 FL    MCH 28.5 26.0 - 34.0 PG    MCHC 31.8 30.0 - 36.5 g/dL    RDW 12.6 11.5 - 14.5 %    PLATELET 842 964 - 972 K/uL    MPV 9.4 8.9 - 12.9 FL    NRBC 0.0 0  WBC    ABSOLUTE NRBC 0.00 0.00 - 0.01 K/uL    NEUTROPHILS 44 32 - 75 %    LYMPHOCYTES 44 12 - 49 %    MONOCYTES 10 5 - 13 %    EOSINOPHILS 1 0 - 7 %    BASOPHILS 0 0 - 1 %    IMMATURE GRANULOCYTES 0 0.0 - 0.5 %    ABS. NEUTROPHILS 2.8 1.8 - 8.0 K/UL    ABS. LYMPHOCYTES 2.8 0.8 - 3.5 K/UL    ABS. MONOCYTES 0.6 0.0 - 1.0 K/UL    ABS. EOSINOPHILS 0.1 0.0 - 0.4 K/UL    ABS. BASOPHILS 0.0 0.0 - 0.1 K/UL    ABS. IMM. GRANS. 0.0 0.00 - 0.04 K/UL    DF AUTOMATED     METABOLIC PANEL, COMPREHENSIVE    Collection Time: 02/05/18  2:12 PM   Result Value Ref Range    Sodium 140 136 - 145 mmol/L    Potassium 3.9 3.5 - 5.1 mmol/L    Chloride 104 97 - 108 mmol/L    CO2 31 21 - 32 mmol/L    Anion gap 5 5 - 15 mmol/L    Glucose 109 (H) 65 - 100 mg/dL    BUN 12 6 - 20 MG/DL    Creatinine 1.18 0.70 - 1.30 MG/DL    BUN/Creatinine ratio 10 (L) 12 - 20      GFR est AA >60 >60 ml/min/1.73m2    GFR est non-AA >60 >60 ml/min/1.73m2    Calcium 9.3 8.5 - 10.1 MG/DL    Bilirubin, total 0.7 0.2 - 1.0 MG/DL    ALT (SGPT) 26 12 - 78 U/L    AST (SGOT) 16 15 - 37 U/L    Alk.  phosphatase 97 45 - 117 U/L    Protein, total 7.7 6.4 - 8.2 g/dL    Albumin 3.8 3.5 - 5.0 g/dL    Globulin 3.9 2.0 - 4.0 g/dL    A-G Ratio 1.0 (L) 1.1 - 2.2     MAGNESIUM    Collection Time: 02/05/18  2:12 PM   Result Value Ref Range    Magnesium 2.1 1.6 - 2.4 mg/dL   TROPONIN I    Collection Time: 02/05/18  2:12 PM   Result Value Ref Range    Troponin-I, Qt. <0.04 <0.05 ng/mL   NT-PRO BNP    Collection Time: 02/05/18  2:12 PM   Result Value Ref Range    NT pro- (H) 0 - 125 PG/ML   LIPASE    Collection Time: 02/05/18  2:12 PM   Result Value Ref Range Lipase 93 73 - 393 U/L     Radiologic Studies -   CTA CHEST W OR W WO CONT   Final Result      XR CHEST PORT   Final Result        CT Results  (Last 48 hours)               02/05/18 1550  CTA CHEST W OR W WO CONT Final result    Impression:  IMPRESSION:   1.  Pulmonary emboli within the distal right main pulmonary artery extending   into the right upper and lower lobe pulmonary arterial tree; in comparison to   the prior chest CT, overall clot burden has mildly improved. 2.  No other significant change. Narrative:  INDICATION:   Chest pain, acute, pulmonary embolism (PE) suspected        COMPARISON:  CT 1/16/2018       TECHNIQUE:  Following the uneventful intravenous administration of 100 cc Isovue   163, thin helical axial images were obtained through the chest. Postprocessing   was performed. 3D image postprocessing was performed. CT dose reduction was achieved through the use of a standardized protocol   tailored for this examination and automatic exposure control for dose   modulation. FINDINGS:       There are no enlarged mediastinal or hilar lymph nodes. The heart size is   normal.  There is no pericardial effusion. Again demonstrated is thrombus within the distal right main pulmonary artery,   extending into the lobar and segmental branches of the right upper and right   lower lobes. The overall clot burden is mildly improved. There is no evidence of   left lung. The aorta is normal in caliber. There is no focal air space disease. No pulmonary nodule or mass is seen. There   are no pleural effusions. Limited evaluation of the upper abdomen demonstrates diffuse fatty infiltration   of the liver. The osseous structures are unremarkable. CXR Results  (Last 48 hours)               02/05/18 1424  XR CHEST PORT Final result    Impression:  IMPRESSION: No evidence of acute cardiopulmonary process.            Narrative:  INDICATION:  chest pain with irregular heartbeat       COMPARISON: 1/16/2018       FINDINGS: Single AP portable view of the chest obtained at 1417 demonstrates a   stable cardiomediastinal silhouette. The lungs are clear bilaterally. No osseous   abnormalities are seen. Medical Decision Making   I am the first provider for this patient. I reviewed the vital signs, available nursing notes, past medical history, past surgical history, family history and social history. Vital Signs-Reviewed the patient's vital signs. Patient Vitals for the past 12 hrs:   Temp Pulse Resp BP SpO2   02/05/18 1640 - 94 17 - 99 %   02/05/18 1630 - 96 19 140/75 100 %   02/05/18 1615 - 97 22 129/83 99 %   02/05/18 1609 - - - 141/66 -   02/05/18 1456 - 98 20 - 97 %   02/05/18 1408 - 98 16 - 99 %   02/05/18 1357 - 97 21 - 99 %   02/05/18 1347 - 97 18 - 100 %   02/05/18 1330 - (!) 102 21 135/72 98 %   02/05/18 1315 - 98 20 130/76 99 %   02/05/18 1301 - (!) 103 17 - 100 %   02/05/18 1259 - - - (!) 149/96 -   02/05/18 1249 97.2 °F (36.2 °C) (!) 101 20 (!) 157/102 100 %     Pulse Oximetry Analysis - 100% on RA    Cardiac Monitor:   Rate: 101 bpm  Rhythm: Irregularly irregular     EKG interpretation: (Preliminary) 1242  Rhythm: atrial flutter with variable AV block and tachycardia; and irregular. Rate (approx.): 101; Axis: right superior axis deviation; QRS interval: 92; QTc interval: 484 ST/T wave: no significant changes form 01/16/2018 inferior lateral ST changes as seen on prior EKG;   Written by Yennifer Rothman ED Scribxavier, as dictated by [employer], DO. Records Reviewed: Nursing Notes, Old Medical Records, Previous electrocardiograms, Previous Radiology Studies and Previous Laboratory Studies    Provider Notes (Medical Decision Making):   Pt here with CP which began while sleeping with radiation to esophagus, associated with belching.  EKG unchanged from prior, doubt acute ischemia, will obtain troponin, repeat CXR to assess for treatment failure of PE, will treat for reflux, if imaging unremarkable and pain resolved, single troponin is all that is needed to assess risk for ACS. ED Course:   Initial assessment performed. The patients presenting problems have been discussed, and they are in agreement with the care plan formulated and outlined with them. I have encouraged them to ask questions as they arise throughout their visit. Progress Note:   4:38 PM  Pt reports he is feeling significantly improved from the initial presentation. Written by Jacob Monae ED Scribe, as dictated by Farzana Weston DO. Disposition:  DISCHARGE NOTE:    4:41 PM  The patient is ready for discharge. The patient signs, symptoms, diagnosis, and discharge instructions have been discussed and the patient has conveyed their understanding. The patient is to follow-up as reccommended or returned to the ER should their symptoms worsen. Plan has been discussed and patient is in agreement. PLAN:  1. Discharge Medication List as of 2/5/2018  4:38 PM      CONTINUE these medications which have NOT CHANGED    Details   ergocalciferol (ERGOCALCIFEROL) 50,000 unit capsule Take 1 Cap by mouth every seven (7) days. Indications: Vitamin D Deficiency, Normal, Disp-4 Cap, R-2      metoprolol tartrate (LOPRESSOR) 50 mg tablet Take 1 Tab by mouth every twelve (12) hours. , Print, Disp-60 Tab, R-0      apixaban (ELIQUIS) 5 mg tablet 2 tabs Every 12 Hours for 7 Days then 1 tab every 12 hours afterward, Print, Disp-74 Tab, R-0           2. Follow-up Information     Follow up With Details Comments [address],Angelo Lee [place]   [address] [facility]  [address] [address] [phone]      [facility] EMERGENCY DEPT  If symptoms worsen [address] [place] [address]  [phone]        Return to ED if worse     Diagnosis     Clinical Impression:   1. Chest pain, unspecified type      Attestations:   This note is prepared by Jacob Monae, acting as Scribe for [employer] DO Edith. Jessica Roberson DO: The scribe's documentation has been prepared under my direction and personally reviewed by me in its entirety. I confirm that the note above accurately reflects all work, treatment, procedures, and medical decision making performed by me.

## 2018-02-05 NOTE — DISCHARGE INSTRUCTIONS
Chest Pain: Care Instructions  Your Care Instructions    There are many things that can cause chest pain. Some are not serious and will get better on their own in a few days. But some kinds of chest pain need more testing and treatment. Your doctor may have recommended a follow-up visit in the next 8 to 12 hours. If you are not getting better, you may need more tests or treatment. Even though your doctor has released you, you still need to watch for any problems. The doctor carefully checked you, but sometimes problems can develop later. If you have new symptoms or if your symptoms do not get better, get medical care right away. If you have worse or different chest pain or pressure that lasts more than 5 minutes or you passed out (lost consciousness), call 911 or seek other emergency help right away. A medical visit is only one step in your treatment. Even if you feel better, you still need to do what your doctor recommends, such as going to all suggested follow-up appointments and taking medicines exactly as directed. This will help you recover and help prevent future problems. How can you care for yourself at home? · Rest until you feel better. · Take your medicine exactly as prescribed. Call your doctor if you think you are having a problem with your medicine. · Do not drive after taking a prescription pain medicine. When should you call for help? Call 911 if:  ? · You passed out (lost consciousness). ? · You have severe difficulty breathing. ? · You have symptoms of a heart attack. These may include:  ¨ Chest pain or pressure, or a strange feeling in your chest.  ¨ Sweating. ¨ Shortness of breath. ¨ Nausea or vomiting. ¨ Pain, pressure, or a strange feeling in your back, neck, jaw, or upper belly or in one or both shoulders or arms. ¨ Lightheadedness or sudden weakness. ¨ A fast or irregular heartbeat.   After you call 911, the  may tell you to chew 1 adult-strength or 2 to 4 low-dose aspirin. Wait for an ambulance. Do not try to drive yourself. ?Call your doctor today if:  ? · You have any trouble breathing. ? · Your chest pain gets worse. ? · You are dizzy or lightheaded, or you feel like you may faint. ? · You are not getting better as expected. ? · You are having new or different chest pain. Where can you learn more? Go to http://birgit-rashaun.info/. Enter A120 in the search box to learn more about \"Chest Pain: Care Instructions. \"  Current as of: March 20, 2017  Content Version: 11.4  © 8187-9507 [employer]. Care instructions adapted under license by [employer] (which disclaims liability or warranty for this information). If you have questions about a medical condition or this instruction, always ask your healthcare professional. [address] any warranty or liability for your use of this information.

## 2018-02-05 NOTE — ED NOTES
MD Sharma reviewed discharge instructions with the patient. The patient verbalized understanding. Pt leaving alert oriented and ambulatory with wife driving pt home.

## 2018-02-05 NOTE — ED NOTES
Assumed care of pt from triage. Pt ambulatory to room. Pt reportslast night he felt tightness around neck, no SOB however. Non-productive cough occ. No phlegm. No fevers or chills. Connected to monitor x3.

## 2018-02-05 NOTE — ED NOTES
Pt reports his neck still feels tight around neck. Pt is belching a lot more than usual, pt reports he does feel better after belching. Pt reporting when he lays for long periods of time he notices his (R)leg goes numb. He has been to PCP for this. Has been going on x2 weeks.

## 2018-02-05 NOTE — ED NOTES
Pt back from imaging. Pt reports at first he felt worse after med administration but then felt at his baseline. The discomfort in his neck is still noted.

## 2018-02-08 ENCOUNTER — OFFICE VISIT (OUTPATIENT)
Dept: CARDIOLOGY CLINIC | Age: 58
End: 2018-02-08

## 2018-02-08 VITALS
HEART RATE: 79 BPM | BODY MASS INDEX: 37.1 KG/M2 | SYSTOLIC BLOOD PRESSURE: 130 MMHG | HEIGHT: 72 IN | WEIGHT: 273.9 LBS | OXYGEN SATURATION: 92 % | RESPIRATION RATE: 18 BRPM | DIASTOLIC BLOOD PRESSURE: 82 MMHG

## 2018-02-08 DIAGNOSIS — I26.92 ACUTE SADDLE PULMONARY EMBOLISM WITHOUT ACUTE COR PULMONALE (HCC): ICD-10-CM

## 2018-02-08 DIAGNOSIS — I48.92 ATRIAL FLUTTER, UNSPECIFIED TYPE (HCC): Primary | ICD-10-CM

## 2018-02-08 DIAGNOSIS — I10 ESSENTIAL HYPERTENSION: ICD-10-CM

## 2018-02-08 NOTE — PROGRESS NOTES
2/8/2018 4:17 PM      Subjective:     Orlin Veloz is seen in office today for f/u visit after recent admission at [id number] [place] [address] for PE. Seen by me for a. Flutter. Feels good. He denies chest pain, chest pressure/discomfort, dyspnea, palpitations, irregular heart beats, near-syncope, syncope, fatigue, orthopnea, paroxysmal nocturnal dyspnea, exertional chest pressure/discomfort, claudication, lower extremity edema. Visit Vitals    /82 (BP 1 Location: Right arm, BP Patient Position: Sitting)    Pulse 79    Resp 18    Ht 6' (1.829 m)    Wt 273 lb 14.4 oz (124.2 kg)    SpO2 92%    BMI 37.15 kg/m2     Current Outpatient Prescriptions   Medication Sig    famotidine (PEPCID) 20 mg tablet Take 1 Tab by mouth two (2) times a day for 14 days.  ergocalciferol (ERGOCALCIFEROL) 50,000 unit capsule Take 1 Cap by mouth every seven (7) days. Indications: Vitamin D Deficiency    metoprolol tartrate (LOPRESSOR) 50 mg tablet Take 1 Tab by mouth every twelve (12) hours.  apixaban (ELIQUIS) 5 mg tablet 2 tabs Every 12 Hours for 7 Days then 1 tab every 12 hours afterward     No current facility-administered medications for this visit. Objective:      Visit Vitals    /82 (BP 1 Location: Right arm, BP Patient Position: Sitting)    Pulse 79    Resp 18    Ht 6' (1.829 m)    Wt 273 lb 14.4 oz (124.2 kg)    SpO2 92%    BMI 37.15 kg/m2       Data Review:     EKG: a. Flutter.      Reviewed and/or ordered active problem list, medication list tests    Past Medical History:   Diagnosis Date    Atrial flutter ([address] 1/18/2018    Atrial flutter ([address] 1/18/2018    Cerumen impaction 10/14/2013    Controlled type 2 diabetes mellitus without complication, without long-term current use of insulin ([address]     Controlled type 2 diabetes mellitus without complication, without long-term current use of insulin ([address] 1/29/2018    Elevated blood pressure 10/14/2013    Hyperlipidemia with target LDL less than 100 11/26/2013    Obesity, unspecified 10/10/2013    Pulmonary emboli ([facility] 01/19/2018    [facility] -    Pulmonary embolism ([facility] [address] 1/16/2018    Vitamin D deficiency 1/29/2018      History reviewed. No pertinent surgical history. Allergies   Allergen Reactions    Ibuprofen Other (comments)     Increase B/P      Family History   Problem Relation Age of Onset    Hypertension Mother     Hypertension Father       Social History     Social History    Marital status:      Spouse name: N/A    Number of children: N/A    Years of education: N/A     Occupational History    Not on file. Social History Main Topics    Smoking status: Never Smoker    Smokeless tobacco: Never Used    Alcohol use No    Drug use: No    Sexual activity: Yes     Partners: Female     Other Topics Concern    Not on file     Social History Narrative     to [employer]         Review of Systems     General: Not Present- Anorexia, Chills, Dietary Changes, Fatigue, Fever, Medication Changes, Night Sweats, Weight Gain > 10lbs. and Weight Loss > 10lbs. .  Skin: Not Present- Bruising and Excessive Sweating. HEENT: Not Present- Headache, Visual Loss and Vertigo. Respiratory: Not Present- Cough, Decreased Exercise Tolerance, Difficulty Breathing, Snoring and Wheezing. Cardiovascular: Not Present- Abnormal Blood Pressure, Chest Pain, Claudications, Difficulty Breathing On Exertion, Edema, Fainting / Blacking Out, Irregular Heart Beat, Night Cramps, Orthopnea, Palpitations, Paroxysmal Nocturnal Dyspnea, Rapid Heart Rate, Shortness of Breath and Swelling of Extremities. Gastrointestinal: Not Present- Black, Tarry Stool, Bloody Stool, Diarrhea, Hematemesis, Rectal Bleeding and Vomiting. Musculoskeletal: Not Present- Muscle Pain and Muscle Weakness. Neurological: Not Present- Dizziness. Psychiatric: Not Present- Depression.   Endocrine: Not Present- Cold Intolerance, Heat Intolerance and Thyroid Problems. Hematology: Not Present- Abnormal Bleeding, Anemia, Blood Clots and Easy Bruising.       Physical Exam   The physical exam findings are as follows:       General   Mental Status - Alert. General Appearance - Not in acute distress. Chest and Lung Exam   Inspection: Accessory muscles - No use of accessory muscles in breathing. Auscultation:   Breath sounds: - Normal.      Cardiovascular   Inspection: Jugular vein - Bilateral - Inspection Normal.  Palpation/Percussion:   Apical Impulse: - Normal.  Auscultation: Rhythm - Irregular. Heart Sounds - S1 WNL and S2 WNL. No S3 or S4. Murmurs & Other Heart Sounds: Auscultation of the heart reveals - No Murmurs. Carotid arteries - No Carotid bruit. Peripheral Vascular   Upper Extremity: Inspection - Bilateral - No Cyanotic nailbeds or Digital clubbing. Lower Extremity:   Palpation: Edema - Bilateral - No edema. Assessment:       ICD-10-CM ICD-9-CM    1. Atrial flutter, unspecified type ([address] I48.92 427.32 AMB POC EKG ROUTINE W/ 12 LEADS, INTER & REP   2. Acute saddle pulmonary embolism without acute cor pulmonale (HCC) I26.92 415.13    3. Essential hypertension I10 401.9        Plan:     1. Atrial flutter: rate controlled. Option of MIQUEL/DCCV, EP evaluation for ablation d/w pt. He wants to think about and will get back. 2. PE: on NOAC. Stable. 3. BP stable. 4. Last FLP reviewed. Diet and exercise.

## 2018-02-08 NOTE — PROGRESS NOTES
Chief Complaint   Patient presents with   [facility] Follow Up     C/O feeling like off/on indigestion, was seen in ED 2/5/18 due to chest pressure. 1. Have you been to the ER, urgent care clinic since your last visit? Hospitalized since your last visit? Yes When: [id number] 2/5/18 due to chest pressure    2. Have you seen or consulted any other health care providers outside of the [address] [place] [address] since your last visit? Include any pap smears or colon screening.  No

## 2018-02-08 NOTE — MR AVS SNAPSHOT
[address] [facility] 
[phone] Patient: Niko Perry MRN: [id number] DGV:8[birth year]58[birth year] Visit Information Date & Time Provider Department Dept. Phone Encounter #  
 2/8/2018  3:30 PM Minh Porras, [address] [facility] [phone] [id number] Your Appointments 2/19/2018 11:00 AM  
New Patient with Elicia De Dios MD  
[facility] Appt Note: New pt- hospital fup (PE)  
 [address] [facility] [address] [facility]  
[phone]  
  
   
 [address] [place] [address]  
  
    
 2/22/2018  8:30 AM  
Any with Shama Jung NP [facility]) Appt Note: f/u  
 [address] [facility] [phone] [id number] [phone] 600 [facility] [address] Upcoming Health Maintenance Date Due Hepatitis C Screening 1960 FOOT EXAM Q1 8/23/1969 MICROALBUMIN Q1 8/23/1969 EYE EXAM RETINAL OR DILATED Q1 8/23/1969 COLONOSCOPY 8/23/1977 Pneumococcal 19-64 Medium Risk (1 of 1 - PPSV23) 8/23/1978 DTaP/Tdap/Td series (1 - Tdap) 8/23/1980 Influenza Age 5 to Adult 8/1/2017 HEMOGLOBIN A1C Q6M 7/25/2018 LIPID PANEL Q1 1/18/2019 Allergies as of 2/8/2018  Review Complete On: 2/8/2018 By: Minh Porras MD  
  
 Severity Noted Reaction Type Reactions Ibuprofen  10/10/2013   Side Effect Other (comments) Increase B/P Current Immunizations  Never Reviewed No immunizations on file. Not reviewed this visit You Were Diagnosed With   
  
 Codes Comments Atrial flutter, unspecified type ([facility]    -  Primary ICD-10-CM: I48.92 
ICD-9-CM: 427.32 Acute saddle pulmonary embolism without acute cor pulmonale (HCC)     ICD-10-CM: I26.92 
ICD-9-CM: 415.13 Vitals BP Pulse Resp Height(growth percentile) Weight(growth percentile) SpO2  
 130/82 (BP 1 Location: Right arm, BP Patient Position: Sitting) 79 18 6' (1.829 m) 273 lb 14.4 oz (124.2 kg) 92% BMI Smoking Status 37.15 kg/m2 Never Smoker Vitals History BMI and BSA Data Body Mass Index Body Surface Area  
 37.15 kg/m 2 2.51 m 2 Preferred Pharmacy Pharmacy Name Phone Maryam Justin [address]  [place] [phone] Your Updated Medication List  
  
   
This list is accurate as of: 2/8/18  4:17 PM.  Always use your most recent med list.  
  
  
  
  
 apixaban 5 mg tablet Commonly known as:  ELIQUIS  
2 tabs Every 12 Hours for 7 Days then 1 tab every 12 hours afterward  
  
 ergocalciferol 50,000 unit capsule Commonly known as:  ERGOCALCIFEROL Take 1 Cap by mouth every seven (7) days. Indications: Vitamin D Deficiency  
  
 famotidine 20 mg tablet Commonly known as:  PEPCID Take 1 Tab by mouth two (2) times a day for 14 days. metoprolol tartrate 50 mg tablet Commonly known as:  LOPRESSOR Take 1 Tab by mouth every twelve (12) hours. We Performed the Following AMB POC EKG ROUTINE W/ 12 LEADS, INTER & REP [61303 CPT(R)] Introducing [facility] SERVICES! Ellyn Martinez introduces Tip Network patient portal. Now you can access parts of your medical record, email your doctor's office, and request medication refills online. 1. In your internet browser, go to [email or web address] [email or web address] Pets 2. Click on the First Time User? Click Here link in the Sign In box. You will see the New Member Sign Up page. 3. Enter your Tip Network Access Code exactly as it appears below. You will not need to use this code after youve completed the sign-up process. If you do not sign up before the expiration date, you must request a new code. · Tip Network Access Code: [email or web address] Expires: 4/19/2018  9:27 AM 
 
4. Enter the last four digits of your Social Security Number (xxxx) and Date of Birth (mm/dd/yyyy) as indicated and click Submit. You will be taken to the next sign-up page. 5. Create a Job36 ID. This will be your Job36 login ID and cannot be changed, so think of one that is secure and easy to remember. 6. Create a Job36 password. You can change your password at any time. 7. Enter your Password Reset Question and Answer. This can be used at a later time if you forget your password. 8. Enter your e-mail address. You will receive e-mail notification when new information is available in [address] 9. Click Sign Up. You can now view and download portions of your medical record. 10. Click the Download Summary menu link to download a portable copy of your medical information. If you have questions, please visit the Frequently Asked Questions section of the Job36 website. Remember, Job36 is NOT to be used for urgent needs. For medical emergencies, dial 911. Now available from your iPhone and Android! Please provide this summary of care documentation to your next provider. Your primary care clinician is listed as NONE. If you have any questions after today's visit, please call [phone].

## 2018-02-12 ENCOUNTER — TELEPHONE (OUTPATIENT)
Dept: CARDIOLOGY CLINIC | Age: 58
End: 2018-02-12

## 2018-02-12 DIAGNOSIS — I48.92 ATRIAL FLUTTER, UNSPECIFIED TYPE (HCC): Primary | ICD-10-CM

## 2018-02-12 NOTE — TELEPHONE ENCOUNTER
Returned pt's call,verified pt with two pt identifiers, pt stated that he is willing to proceed with the Cardioversion. Advised him I would let  know and someone will call him and get back to him in a couple of days to schedule. He verbalized that he understood everything. Katerina Thompson MD   You 15 hours ago (5:13 PM)                 Done. (Routing comment)                        You   Caitlyn Leong MD 17 hours ago (3:21 PM)                   Pt is willing to proceed with cardioversion. Can you please put orders in? Thank you! Message  Received: Today       Ino Lyn LPN       Caller: Unspecified (Yesterday,  9:29 AM)                     I have the orders and and I will give him a call. Thank you,   Naseem Fuller       Noted. Thank you.

## 2018-02-16 ENCOUNTER — DOCUMENTATION ONLY (OUTPATIENT)
Dept: ONCOLOGY | Age: 58
End: 2018-02-16

## 2018-02-16 RX ORDER — METOPROLOL TARTRATE 50 MG/1
TABLET ORAL
Qty: 60 TAB | Refills: 0 | Status: SHIPPED | OUTPATIENT
Start: 2018-02-16 | End: 2018-02-16 | Stop reason: SDUPTHER

## 2018-02-16 NOTE — PROGRESS NOTES
Pt has appointment on Monday 2/19/18 to see Dr Arie Waters, will run out of medicine before his appointment    adi candelario NP, refill Eliqius 5 mg bid , #60, no refills

## 2018-02-19 ENCOUNTER — OFFICE VISIT (OUTPATIENT)
Dept: ONCOLOGY | Age: 58
End: 2018-02-19

## 2018-02-19 VITALS
BODY MASS INDEX: 36.7 KG/M2 | HEIGHT: 72 IN | TEMPERATURE: 98 F | RESPIRATION RATE: 20 BRPM | DIASTOLIC BLOOD PRESSURE: 92 MMHG | SYSTOLIC BLOOD PRESSURE: 134 MMHG | WEIGHT: 271 LBS | OXYGEN SATURATION: 96 % | HEART RATE: 118 BPM

## 2018-02-19 DIAGNOSIS — I26.92 ACUTE SADDLE PULMONARY EMBOLISM WITHOUT ACUTE COR PULMONALE (HCC): Primary | ICD-10-CM
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
## 2018-02-20 NOTE — PROGRESS NOTES
Hematology Follow Up        Patient: Junior Olson MRN: [id number]  SSN: [id number]    YOB: 1960  Age: 61 y.o. Sex: male      Subjective:      Junior Olson is a 61 y.o. male who I saw in the hospital for a new diagnosis of PE. He suffered an episode of unprovoked saddle PE. He has been taking Apixaban. He continues to have some chest pressure. No bleeding. He has also been diagnosed with A. Fib/A. flutter and is scheduled to undergo an ablation procedure. Review of Systems:    Constitutional: negative  Eyes: negative  Ears, Nose, Mouth, Throat, and Face: negative  Respiratory: dyspnea  Cardiovascular: negative  Gastrointestinal: negative  Genitourinary:negative  Integument/Breast: negative  Hematologic/Lymphatic: negative  Musculoskeletal:negative  Neurological: negative          Past Medical History:   Diagnosis Date    Atrial flutter ([address] 1/18/2018    Atrial flutter ([address] 1/18/2018    Cerumen impaction 10/14/2013    Controlled type 2 diabetes mellitus without complication, without long-term current use of insulin ([facility]     Controlled type 2 diabetes mellitus without complication, without long-term current use of insulin ([address] 1/29/2018    Elevated blood pressure 10/14/2013    Hyperlipidemia with target LDL less than 100 11/26/2013    Obesity, unspecified 10/10/2013    Pulmonary emboli ([address] 01/19/2018    [address]    Pulmonary embolism ([facility] 1/16/2018    Vitamin D deficiency 1/29/2018     History reviewed. No pertinent surgical history. Family History   Problem Relation Age of Onset    Hypertension Mother     Hypertension Father      Social History   Substance Use Topics    Smoking status: Never Smoker    Smokeless tobacco: Never Used    Alcohol use No      Prior to Admission medications    Medication Sig Start Date End Date Taking?  Authorizing Provider   ELIQUIS 5 mg tablet TAKE 2 TABLET BY MOUTH EVERY 12 HOURS FOR 7 DAYS THEN TAKE 1 TABLET EVERY 12 HOURS AFTERWARDS. 2/16/18  Yes Noemi Duran MD   metoprolol tartrate (LOPRESSOR) 50 mg tablet TAKE 1 TABLET BY MOUTH EVERY 12 HOURS 2/16/18  Yes Eduardo Redman NP   famotidine (PEPCID) 20 mg tablet Take 1 Tab by mouth two (2) times a day for 14 days. 2/5/18 2/19/18 Yes Percy Nolan DO   ergocalciferol (ERGOCALCIFEROL) 50,000 unit capsule Take 1 Cap by mouth every seven (7) days. Indications: Vitamin D Deficiency 1/29/18  Yes Ratna Baltazar NP              Allergies   Allergen Reactions    Ibuprofen Other (comments)     Increase B/P           Objective:     Vitals:    02/19/18 1111   BP: (!) 134/92   Pulse: (!) 118   Resp: 20   Temp: 98 °F (36.7 °C)   SpO2: 96%   Weight: 271 lb (122.9 kg)   Height: 6' (1.829 m)            Physical Exam:    GENERAL: alert, cooperative, no distress, appears stated age  EYE: negative  LYMPHATIC: Cervical, supraclavicular, and axillary nodes normal.   THROAT & NECK: normal and no erythema or exudates noted. LUNG: clear to auscultation bilaterally  HEART: regular rate and rhythm  ABDOMEN: soft, non-tender  EXTREMITIES:  no edema  SKIN: Normal.  NEUROLOGIC: negative            Assessment:     1. Pulmonary embolism:    > 1st episode/event of  PE in 01/2018 - unprovoked    On Apixaban  Experiences chest tightness and dyspnea on exertion. Symptoms are improving. Since this is the first episode of PE, I recommend at least 6 months of systemic anticoagulation with Apixaban. If in 6 months, he does not suffer recurrence of A. Fib/A. Flutter, I plan to discontinue systemic anticoagulation. Plan:       > Complete 6 months of systemic anticoagulation with Apixaban  > Return in June 2018      Signed by: Noemi Duran MD                     February 19, 2018         CC.  Jean Pierre Martínez NP

## 2018-02-23 ENCOUNTER — HOSPITAL ENCOUNTER (OUTPATIENT)
Dept: NON INVASIVE DIAGNOSTICS | Age: 58
Discharge: HOME | End: 2018-02-23
Attending: INTERNAL MEDICINE
Payer: COMMERCIAL

## 2018-02-23 VITALS
HEART RATE: 67 BPM | BODY MASS INDEX: 36.57 KG/M2 | DIASTOLIC BLOOD PRESSURE: 78 MMHG | SYSTOLIC BLOOD PRESSURE: 103 MMHG | HEIGHT: 72 IN | WEIGHT: 270 LBS | RESPIRATION RATE: 18 BRPM | OXYGEN SATURATION: 96 %

## 2018-02-23 DIAGNOSIS — I48.92 ATRIAL FLUTTER, UNSPECIFIED TYPE (HCC): ICD-10-CM

## 2018-02-23 DIAGNOSIS — I48.92 ATRIAL FLUTTER (HCC): ICD-10-CM

## 2018-02-23 LAB
ATRIAL RATE: 75 BPM
CALCULATED P AXIS, ECG09: 56 DEGREES
CALCULATED R AXIS, ECG10: -67 DEGREES
CALCULATED T AXIS, ECG11: 32 DEGREES
DIAGNOSIS, 93000: NORMAL
P-R INTERVAL, ECG05: 200 MS
Q-T INTERVAL, ECG07: 414 MS
QRS DURATION, ECG06: 106 MS
QTC CALCULATION (BEZET), ECG08: 462 MS
VENTRICULAR RATE, ECG03: 75 BPM

## 2018-02-23 PROCEDURE — 99152 MOD SED SAME PHYS/QHP 5/>YRS: CPT

## 2018-02-23 PROCEDURE — 74011000250 HC RX REV CODE- 250: Performed by: INTERNAL MEDICINE

## 2018-02-23 PROCEDURE — 93005 ELECTROCARDIOGRAM TRACING: CPT

## 2018-02-23 PROCEDURE — 93312 ECHO TRANSESOPHAGEAL: CPT

## 2018-02-23 PROCEDURE — 93325 DOPPLER ECHO COLOR FLOW MAPG: CPT

## 2018-02-23 PROCEDURE — 92960 CARDIOVERSION ELECTRIC EXT: CPT

## 2018-02-23 PROCEDURE — 74011250636 HC RX REV CODE- 250/636

## 2018-02-23 PROCEDURE — 77030018729 HC ELECTRD DEFIB PAD CARD -B

## 2018-02-23 PROCEDURE — 74011000250 HC RX REV CODE- 250

## 2018-02-23 RX ORDER — LIDOCAINE HYDROCHLORIDE 20 MG/ML
15 SOLUTION OROPHARYNGEAL ONCE
Status: COMPLETED | OUTPATIENT
Start: 2018-02-23 | End: 2018-02-23

## 2018-02-23 RX ORDER — FENTANYL CITRATE 50 UG/ML
25-50 INJECTION, SOLUTION INTRAMUSCULAR; INTRAVENOUS
Status: DISCONTINUED | OUTPATIENT
Start: 2018-02-23 | End: 2018-02-23 | Stop reason: ALTCHOICE

## 2018-02-23 RX ORDER — MIDAZOLAM HYDROCHLORIDE 1 MG/ML
.5-2 INJECTION, SOLUTION INTRAMUSCULAR; INTRAVENOUS
Status: DISCONTINUED | OUTPATIENT
Start: 2018-02-23 | End: 2018-02-23 | Stop reason: ALTCHOICE

## 2018-02-23 RX ORDER — FENTANYL CITRATE 50 UG/ML
INJECTION, SOLUTION INTRAMUSCULAR; INTRAVENOUS
Status: COMPLETED
Start: 2018-02-23 | End: 2018-02-23

## 2018-02-23 RX ORDER — MIDAZOLAM HYDROCHLORIDE 1 MG/ML
INJECTION, SOLUTION INTRAMUSCULAR; INTRAVENOUS
Status: COMPLETED
Start: 2018-02-23 | End: 2018-02-23

## 2018-02-23 RX ORDER — LIDOCAINE HYDROCHLORIDE 20 MG/ML
SOLUTION OROPHARYNGEAL
Status: COMPLETED
Start: 2018-02-23 | End: 2018-02-23

## 2018-02-23 RX ADMIN — LIDOCAINE HYDROCHLORIDE 15 ML: 20 SOLUTION OROPHARYNGEAL at 08:15

## 2018-02-23 RX ADMIN — MIDAZOLAM HYDROCHLORIDE 1 MG: 1 INJECTION, SOLUTION INTRAMUSCULAR; INTRAVENOUS at 08:26

## 2018-02-23 RX ADMIN — FENTANYL CITRATE 50 MCG: 50 INJECTION, SOLUTION INTRAMUSCULAR; INTRAVENOUS at 08:14

## 2018-02-23 RX ADMIN — MIDAZOLAM HYDROCHLORIDE 2 MG: 1 INJECTION, SOLUTION INTRAMUSCULAR; INTRAVENOUS at 08:15

## 2018-02-23 RX ADMIN — LIDOCAINE HYDROCHLORIDE 15 ML: 20 SOLUTION ORAL; TOPICAL at 08:15

## 2018-02-23 RX ADMIN — MIDAZOLAM HYDROCHLORIDE 1 MG: 1 INJECTION, SOLUTION INTRAMUSCULAR; INTRAVENOUS at 08:18

## 2018-02-23 RX ADMIN — BENZOCAINE, BUTAMBEN, AND TETRACAINE HYDROCHLORIDE 1 SPRAY: .028; .004; .004 AEROSOL, SPRAY TOPICAL at 08:16

## 2018-02-23 RX ADMIN — MIDAZOLAM HYDROCHLORIDE 2 MG: 1 INJECTION, SOLUTION INTRAMUSCULAR; INTRAVENOUS at 08:30

## 2018-02-23 NOTE — DISCHARGE INSTRUCTIONS
DISCHARGE SUMMARY from Tera Coyel RN        The following personal items collected during your admission are returned to you:   Clothing:          PATIENT INSTRUCTIONS: Continue taking all the same medications      If you had a transesophageal echocardiogram, start with sips of water and advance diet as swallowing returns to normal. Avoid any scalding hot beverages for the next 2 hours. The cardioversion procedure can cause redness to the skin where the patches were placed. You may treat this with Aloe or Cortisone cream over the counter lotion. If the skin appears very reddened or blistered contact your physician      Dr. Rodger Fonseca office will call you to schedule a follow up appointment    What to do at Home:  Recommended activity: No driving today      The discharge information has been reviewed with the PATIENT . The PATIENT  verbalized understanding.

## 2018-02-23 NOTE — PROGRESS NOTES
Patient arrived to Non-Invasive Cardiology Lab for Out Patient Procedure. Staff introduced to patient. Patient identifiers verified with Name and Date of Birth. Procedure verified with patient. Consent forms reviewed and signed by patient or authorized representative and verified. Allergies verified. Patient informed of procedure and plan of care. Questions answered with review. Patient on cardiac monitor, non-invasive blood pressure, SPO2 monitor. On RA. Patient is A&Ox3. Patient reports no complaints. Patient on stretcher, in low position, with side rails up. Patient instructed to call for assistance as needed. Family in waiting room.  ASA 2  Mallampati  3  Per MD

## 2018-02-23 NOTE — PROCEDURES
[address]  MR#: [id number]  : 1960  ACCOUNT #: [de-identified]   DATE OF SERVICE: 2018    PROCEDURE:  Direct current cardioversion. INDICATION:  Atrial flutter. BRIEF PROCEDURE NOTE:  At the completion of MIQUEL, patient was successfully cardioverted using 100 joules of synchronized cardioversion. Converted to sinus rhythm after first attempt. COMPLICATIONS:  None. ANESTHESIA:  IV conscious sedation.       Melly Fairbanks MD       [address] [place] [address] / Nannette Castleman  D: 2018 08:33     T: 2018 08:45  JOB #: [id number]

## 2018-02-23 NOTE — PROGRESS NOTES
Pt sedated with 50 mcg IV Fentanyl and 3 mg IV Versed for MIQUEL. Pt tolerated well. Pt given an additional 3 mg IV Versed for Elective Cardioversion. Pt given 1 synch shock at 100 joules. Rhythm converted to sinus. Monitored sedation time 8:15 to 8:30.

## 2018-03-15 ENCOUNTER — TELEPHONE (OUTPATIENT)
Dept: ONCOLOGY | Age: 58
End: 2018-03-15

## 2018-03-15 DIAGNOSIS — I26.92 ACUTE SADDLE PULMONARY EMBOLISM WITHOUT ACUTE COR PULMONALE (HCC): Primary | ICD-10-CM

## 2018-03-15 RX ORDER — METOPROLOL TARTRATE 50 MG/1
TABLET ORAL
Qty: 60 TAB | Refills: 0 | Status: SHIPPED | OUTPATIENT
Start: 2018-03-15 | End: 2018-03-26 | Stop reason: SDUPTHER

## 2018-03-15 NOTE — TELEPHONE ENCOUNTER
Heme/Onc On Call Note  Patient called to report a problem with his apixaban prescription which he needed addressed tonight. It is written for 2 tabs BID, when it should now be 1 tab BID. His pharmacy is trying to fill the prescription currently. I corrected the prescription and sent it electronically to his pharmacy, with enough refills to last until his next appointment with Dr. Sandra Flro.

## 2018-03-26 ENCOUNTER — OFFICE VISIT (OUTPATIENT)
Dept: CARDIOLOGY CLINIC | Age: 58
End: 2018-03-26

## 2018-03-26 VITALS
OXYGEN SATURATION: 98 % | SYSTOLIC BLOOD PRESSURE: 120 MMHG | HEART RATE: 60 BPM | WEIGHT: 272.5 LBS | BODY MASS INDEX: 36.91 KG/M2 | HEIGHT: 72 IN | DIASTOLIC BLOOD PRESSURE: 80 MMHG

## 2018-03-26 DIAGNOSIS — I48.92 ATRIAL FLUTTER, UNSPECIFIED TYPE (HCC): Primary | ICD-10-CM

## 2018-03-26 DIAGNOSIS — I26.92 SADDLE EMBOLUS OF PULMONARY ARTERY WITHOUT ACUTE COR PULMONALE, UNSPECIFIED CHRONICITY (HCC): ICD-10-CM

## 2018-03-26 DIAGNOSIS — E78.2 MIXED HYPERLIPIDEMIA: ICD-10-CM

## 2018-03-26 NOTE — PROGRESS NOTES
1. Have you been to the ER, urgent care clinic since your last visit? Hospitalized since your last visit? 2. Have you seen or consulted any other health care providers outside of the [address] [place] [address] since your last visit? Include any pap smears or colon screening.

## 2018-03-26 NOTE — PROGRESS NOTES
3/26/2018 11:37 AM      Subjective:     Niko Perry is here for f/u visit. He denies chest pain, chest pressure/discomfort, dyspnea, palpitations, irregular heart beats, near-syncope, syncope, fatigue, orthopnea, paroxysmal nocturnal dyspnea, exertional chest pressure/discomfort, claudication, lower extremity edema, tachypnea. Visit Vitals    /80    Pulse 60    Ht 6' (1.829 m)    Wt 272 lb 8 oz (123.6 kg)    SpO2 98%    BMI 36.96 kg/m2     Current Outpatient Prescriptions   Medication Sig    apixaban (ELIQUIS) 5 mg tablet Take 1 Tab by mouth two (2) times a day.  metoprolol tartrate (LOPRESSOR) 50 mg tablet TAKE 1 TABLET BY MOUTH EVERY 12 HOURS    ergocalciferol (ERGOCALCIFEROL) 50,000 unit capsule Take 1 Cap by mouth every seven (7) days. Indications: Vitamin D Deficiency     No current facility-administered medications for this visit. Objective:      Visit Vitals    /80    Pulse 60    Ht 6' (1.829 m)    Wt 272 lb 8 oz (123.6 kg)    SpO2 98%    BMI 36.96 kg/m2       Data Review:     EKG: Normal sinus rhythm, no acute st/t changes    Reviewed and/or ordered active problem list tests    Past Medical History:   Diagnosis Date    Atrial flutter ([address] 1/18/2018    Atrial flutter ([address] 1/18/2018    Cerumen impaction 10/14/2013    Controlled type 2 diabetes mellitus without complication, without long-term current use of insulin ([address]     Controlled type 2 diabetes mellitus without complication, without long-term current use of insulin ([address] 1/29/2018    Elevated blood pressure 10/14/2013    Hyperlipidemia with target LDL less than 100 11/26/2013    Obesity, unspecified 10/10/2013    Pulmonary emboli ([address] 01/19/2018    [address] -    Pulmonary embolism ([address] 1/16/2018    Vitamin D deficiency 1/29/2018      No past surgical history on file.   Allergies   Allergen Reactions    Ibuprofen Other (comments)     Increase B/P      Family History Problem Relation Age of Onset    Hypertension Mother     Hypertension Father       Social History     Social History    Marital status:      Spouse name: N/A    Number of children: N/A    Years of education: N/A     Occupational History    Not on file. Social History Main Topics    Smoking status: Never Smoker    Smokeless tobacco: Never Used    Alcohol use No    Drug use: No    Sexual activity: Yes     Partners: Female     Other Topics Concern    Not on file     Social History Narrative     to [employer]         Review of Systems     General: Not Present- Anorexia, Chills, Dietary Changes, Fatigue, Fever, Medication Changes, Night Sweats, Weight Gain > 10lbs. and Weight Loss > 10lbs. .  Skin: Not Present- Bruising and Excessive Sweating. HEENT: Not Present- Headache, Visual Loss and Vertigo. Respiratory: Not Present- Cough, Decreased Exercise Tolerance, Difficulty Breathing, Snoring and Wheezing. Cardiovascular: Not Present- Abnormal Blood Pressure, Chest Pain, Claudications, Difficulty Breathing On Exertion, Edema, Fainting / Blacking Out, Irregular Heart Beat, Night Cramps, Orthopnea, Palpitations, Paroxysmal Nocturnal Dyspnea, Rapid Heart Rate, Shortness of Breath and Swelling of Extremities. Gastrointestinal: Not Present- Black, Tarry Stool, Bloody Stool, Diarrhea, Hematemesis, Rectal Bleeding and Vomiting. Musculoskeletal: Not Present- Muscle Pain and Muscle Weakness. Neurological: Not Present- Dizziness. Psychiatric: Not Present- Depression. Endocrine: Not Present- Cold Intolerance, Heat Intolerance and Thyroid Problems. Hematology: Not Present- Abnormal Bleeding, Anemia, Blood Clots and Easy Bruising.       Physical Exam   The physical exam findings are as follows:       General   Mental Status - Alert. General Appearance - Not in acute distress.       Chest and Lung Exam   Inspection: Accessory muscles - No use of accessory muscles in breathing. Auscultation:   Breath sounds: - Normal.      Cardiovascular   Inspection: Jugular vein - Bilateral - Inspection Normal.  Palpation/Percussion:   Apical Impulse: - Normal.  Auscultation: Rhythm - Regular. Heart Sounds - S1 WNL and S2 WNL. No S3 or S4. Murmurs & Other Heart Sounds: Auscultation of the heart reveals - No Murmurs. Carotid arteries - No Carotid bruit. Peripheral Vascular   Upper Extremity: Inspection - Bilateral - No Cyanotic nailbeds or Digital clubbing. Lower Extremity:   Palpation: Edema - Bilateral - No edema. Assessment:       ICD-10-CM ICD-9-CM    1. Atrial flutter, unspecified type (HCC) I48.92 427.32 AMB POC EKG ROUTINE W/ 12 LEADS, INTER & REP   2. Saddle embolus of pulmonary artery without acute cor pulmonale, unspecified chronicity (HCC) I26.92 415.13    3. Mixed hyperlipidemia E78.2 272.2        Plan:     1. Atrial flutter: s/p MIQUEL/DCCV. Remains in sinus rhythm. Continue current meds. 2. PE: on NOAC. Stable. 3. BP stable. 4. Last FLP reviewed. Diet and exercise.

## 2018-03-26 NOTE — MR AVS SNAPSHOT
[address] 
[phone] Patient: Abbi Issa MRN: [id number] PBE:[birth year] Visit Information Date & Time Provider Department Dept. Phone Encounter #  
 3/26/2018 11:00 AM Cassandra Santos [facility] [phone] [id number] Follow-up Instructions Return in about 6 months (around 9/26/2018). Routing History Follow-up and Disposition History Your Appointments 6/1/2018  9:30 AM  
ESTABLISHED PATIENT with Leonor Elizondo MD  
[address] [facility]) Appt Note: f/u  
 [address] [facility] [address] [address] [phone]  
  
   
 [address]  
  
    
 10/2/2018  9:15 AM  
ESTABLISHED PATIENT with Evon Dallas MD  
[facility] [address] [place] [address] [address]  
[phone] [address] Upcoming Health Maintenance Date Due Hepatitis C Screening 1960 FOOT EXAM Q1 8/23/1969 MICROALBUMIN Q1 8/23/1969 EYE EXAM RETINAL OR DILATED Q1 8/23/1969 COLONOSCOPY 8/23/1977 Pneumococcal 19-64 Medium Risk (1 of 1 - PPSV23) 8/23/1978 DTaP/Tdap/Td series (1 - Tdap) 8/23/1980 Influenza Age 5 to Adult 8/1/2017 HEMOGLOBIN A1C Q6M 7/25/2018 LIPID PANEL Q1 1/18/2019 Allergies as of 3/26/2018  Review Complete On: 3/26/2018 By: Evon Dallas MD  
  
 Severity Noted Reaction Type Reactions Ibuprofen  10/10/2013   Side Effect Other (comments) Increase B/P Current Immunizations  Never Reviewed No immunizations on file. Not reviewed this visit You Were Diagnosed With   
  
 Codes Comments  Atrial flutter, unspecified type ([facility]    -  Primary ICD-10-CM: U92.89 
 ICD-9-CM: 427.32 Saddle embolus of pulmonary artery without acute cor pulmonale, unspecified chronicity (HCC)     ICD-10-CM: I26.92 
ICD-9-CM: 415.13 Mixed hyperlipidemia     ICD-10-CM: E78.2 ICD-9-CM: 272.2 Vitals BP Pulse Height(growth percentile) Weight(growth percentile) SpO2 BMI  
 120/80 60 6' (1.829 m) 272 lb 8 oz (123.6 kg) 98% 36.96 kg/m2 Smoking Status Never Smoker Vitals History BMI and BSA Data Body Mass Index Body Surface Area  
 36.96 kg/m 2 2.51 m 2 Preferred Pharmacy Pharmacy Name Phone Maryam Justin [address]  [place] [phone] Your Updated Medication List  
  
   
This list is accurate as of 3/26/18 11:43 AM.  Always use your most recent med list.  
  
  
  
  
 apixaban 5 mg tablet Commonly known as:  [place]pper Take 1 Tab by mouth two (2) times a day. ergocalciferol 50,000 unit capsule Commonly known as:  ERGOCALCIFEROL Take 1 Cap by mouth every seven (7) days. Indications: Vitamin D Deficiency  
  
 metoprolol tartrate 50 mg tablet Commonly known as:  LOPRESSOR  
TAKE 1 TABLET BY MOUTH EVERY 12 HOURS We Performed the Following AMB POC EKG ROUTINE W/ 12 LEADS, INTER & REP [34977 CPT(R)] Follow-up Instructions Return in about 6 months (around 9/26/2018). Introducing [facility]! [employer] introduces ExpertFlyer patient portal. Now you can access parts of your medical record, email your doctor's office, and request medication refills online. 1. In your internet browser, go to [email or web address] 2. Click on the First Time User? Click Here link in the Sign In box. You will see the New Member Sign Up page. 3. Enter your ExpertFlyer Access Code exactly as it appears below. You will not need to use this code after youve completed the sign-up process.  If you do not sign up before the expiration date, you must request a new code. · Shopline Access Code: [email or web address] Expires: 4/19/2018 10:27 AM 
 
4. Enter the last four digits of your Social Security Number (xxxx) and Date of Birth (mm/dd/yyyy) as indicated and click Submit. You will be taken to the next sign-up page. 5. Create a Shopline ID. This will be your Shopline login ID and cannot be changed, so think of one that is secure and easy to remember. 6. Create a Shopline password. You can change your password at any time. 7. Enter your Password Reset Question and Answer. This can be used at a later time if you forget your password. 8. Enter your e-mail address. You will receive e-mail notification when new information is available in [address] 9. Click Sign Up. You can now view and download portions of your medical record. 10. Click the Download Summary menu link to download a portable copy of your medical information. If you have questions, please visit the Frequently Asked Questions section of the Shopline website. Remember, Shopline is NOT to be used for urgent needs. For medical emergencies, dial 911. Now available from your iPhone and Android! Please provide this summary of care documentation to your next provider. Your primary care clinician is listed as [address]. If you have any questions after today's visit, please call [phone].

## 2018-04-14 RX ORDER — METOPROLOL TARTRATE 50 MG/1
TABLET ORAL
Qty: 60 TAB | Refills: 0 | Status: SHIPPED | OUTPATIENT
Start: 2018-04-14 | End: 2018-05-01 | Stop reason: SDUPTHER

## 2018-05-01 ENCOUNTER — OFFICE VISIT (OUTPATIENT)
Dept: FAMILY MEDICINE CLINIC | Age: 58
End: 2018-05-01

## 2018-05-01 VITALS
WEIGHT: 271 LBS | SYSTOLIC BLOOD PRESSURE: 144 MMHG | HEIGHT: 72 IN | DIASTOLIC BLOOD PRESSURE: 81 MMHG | TEMPERATURE: 96.6 F | BODY MASS INDEX: 36.7 KG/M2 | HEART RATE: 55 BPM | RESPIRATION RATE: 18 BRPM | OXYGEN SATURATION: 97 %

## 2018-05-01 DIAGNOSIS — E11.9 CONTROLLED TYPE 2 DIABETES MELLITUS WITHOUT COMPLICATION, WITHOUT LONG-TERM CURRENT USE OF INSULIN (HCC): Primary | ICD-10-CM

## 2018-05-01 DIAGNOSIS — E55.9 VITAMIN D DEFICIENCY: ICD-10-CM

## 2018-05-01 DIAGNOSIS — Z98.890 HISTORY OF CARDIOVERSION: ICD-10-CM

## 2018-05-01 DIAGNOSIS — E66.01 SEVERE OBESITY (BMI 35.0-39.9) WITH COMORBIDITY (HCC): ICD-10-CM

## 2018-05-01 DIAGNOSIS — R60.0 BILATERAL EDEMA OF LOWER EXTREMITY: ICD-10-CM

## 2018-05-01 DIAGNOSIS — I26.92 SADDLE EMBOLUS OF PULMONARY ARTERY WITHOUT ACUTE COR PULMONALE, UNSPECIFIED CHRONICITY (HCC): ICD-10-CM

## 2018-05-01 LAB — HBA1C MFR BLD HPLC: 6.3 %

## 2018-05-01 RX ORDER — GLUCOSAMINE SULFATE 1500 MG
1000 POWDER IN PACKET (EA) ORAL DAILY
COMMUNITY

## 2018-05-01 RX ORDER — LANOLIN ALCOHOL/MO/W.PET/CERES
1000 CREAM (GRAM) TOPICAL DAILY
COMMUNITY
End: 2019-04-09

## 2018-05-01 NOTE — MR AVS SNAPSHOT
[address] [facility] 
 
 
 [address] [facility] [address] [address] 
[phone] Patient: Valarie Fong MRN: [id number] TOR:[id number] Visit Information Date & Time Provider Department Dept. Phone Encounter #  
 5/1/2018  9:00 AM Saima Marti [phone] [id number] Follow-up Instructions Return in about 6 months (around 11/1/2018). Your Appointments 6/1/2018  9:30 AM  
ESTABLISHED PATIENT with Vicki Diaz MD  
[address] [facility] at [facility]) Appt Note: f/u  
 [address] [facility] [address] [address]  
[phone]  
  
   
 [address]  
  
    
 10/2/2018  9:15 AM  
ESTABLISHED PATIENT with Alexandre Chen MD  
[facility]) [address] [facility]  
[phone] [address] [facility] Upcoming Health Maintenance Date Due Hepatitis C Screening 1960 FOOT EXAM Q1 8/23/1969 MICROALBUMIN Q1 8/23/1969 EYE EXAM RETINAL OR DILATED Q1 8/23/1969 COLONOSCOPY 8/23/1977 Pneumococcal 19-64 Medium Risk (1 of 1 - PPSV23) 8/23/1978 DTaP/Tdap/Td series (1 - Tdap) 8/23/1980 HEMOGLOBIN A1C Q6M 7/25/2018 Influenza Age 5 to Adult 8/1/2018 LIPID PANEL Q1 1/18/2019 Allergies as of 5/1/2018  Review Complete On: 5/1/2018 By: Ruddy Mathur LPN Severity Noted Reaction Type Reactions Ibuprofen  10/10/2013   Side Effect Other (comments) Increase B/P Current Immunizations  Never Reviewed No immunizations on file. Not reviewed this visit You Were Diagnosed With   
  
 Codes Comments Controlled type 2 diabetes mellitus without complication, without long-term current use of insulin ([facility]    -  Primary ICD-10-CM: E11.9 ICD-9-CM: 250.00 Saddle embolus of pulmonary artery without acute cor pulmonale, unspecified chronicity (HCC)     ICD-10-CM: I26.92 
ICD-9-CM: 415.13 History of cardioversion     ICD-10-CM: Z98.890 ICD-9-CM: V15.1 Bilateral edema of lower extremity     ICD-10-CM: R60.0 ICD-9-CM: 440. 3 Vitals BP Pulse Temp Resp Height(growth percentile) Weight(growth percentile) 144/81 (BP 1 Location: Left leg, BP Patient Position: Sitting) (!) 55 96.6 °F (35.9 °C) (Oral) 18 6' (1.829 m) 271 lb (122.9 kg) SpO2 BMI Smoking Status 97% 36.75 kg/m2 Never Smoker BMI and BSA Data Body Mass Index Body Surface Area 36.75 kg/m 2 2.5 m 2 Preferred Pharmacy Pharmacy Name Phone [facility] [address] [facility]  [place] [phone] Your Updated Medication List  
  
   
This list is accurate as of 5/1/18  9:54 AM.  Always use your most recent med list.  
  
  
  
  
 apixaban 5 mg tablet Commonly known as:  Donnis Staples Take 1 Tab by mouth two (2) times a day. cyanocobalamin 1,000 mcg tablet Take 1,000 mcg by mouth daily. ergocalciferol 50,000 unit capsule Commonly known as:  ERGOCALCIFEROL Take 1 Cap by mouth every seven (7) days. Indications: Vitamin D Deficiency FISH -160-1,000 mg Cap Generic drug:  omega 3-dha-epa-fish oil Take  by mouth.  
  
 metoprolol tartrate 50 mg tablet Commonly known as:  LOPRESSOR  
TAKE 1 TABLET BY MOUTH EVERY 12 HOURS We Performed the Following AMB POC HEMOGLOBIN A1C [00927 CPT(R)] METABOLIC PANEL, BASIC [26635 CPT(R)] VITAMIN D, 25 HYDROXY [id number] CPT(R)] Follow-up Instructions Return in about 6 months (around 11/1/2018). Patient Instructions Leg and Ankle Edema: Care Instructions Your Care Instructions Swelling in the legs, ankles, and feet is called edema.  It is common after you sit or stand for a while. Long plane flights or car rides often cause swelling in the legs and feet. You may also have swelling if you have to stand for long periods of time at your job. Problems with the veins in the legs (varicose veins) and changes in hormones can also cause swelling. Sometimes the swelling in the ankles and feet is caused by a more serious problem, such as heart failure, infection, blood clots, or liver or kidney disease. Follow-up care is a key part of your treatment and safety. Be sure to make and go to all appointments, and call your doctor if you are having problems. It's also a good idea to know your test results and keep a list of the medicines you take. How can you care for yourself at home? · If your doctor gave you medicine, take it as prescribed. Call your doctor if you think you are having a problem with your medicine. · Whenever you are resting, raise your legs up. Try to keep the swollen area higher than the level of your heart. · Take breaks from standing or sitting in one position. ¨ Walk around to increase the blood flow in your lower legs. ¨ Move your feet and ankles often while you stand, or tighten and relax your leg muscles. · Wear support stockings. Put them on in the morning, before swelling gets worse. · Eat a balanced diet. Lose weight if you need to. · Limit the amount of salt (sodium) in your diet. Salt holds fluid in the body and may increase swelling. When should you call for help? Call 911 anytime you think you may need emergency care. For example, call if: 
? · You have symptoms of a blood clot in your lung (called a pulmonary embolism). These may include: 
¨ Sudden chest pain. ¨ Trouble breathing. ¨ Coughing up blood. ?Call your doctor now or seek immediate medical care if: 
? · You have signs of a blood clot, such as: 
¨ Pain in your calf, back of the knee, thigh, or groin. ¨ Redness and swelling in your leg or groin. ? · You have symptoms of infection, such as: 
¨ Increased pain, swelling, warmth, or redness. ¨ Red streaks or pus. ¨ A fever. ? Watch closely for changes in your health, and be sure to contact your doctor if: 
? · Your swelling is getting worse. ? · You have new or worsening pain in your legs. ? · You do not get better as expected. Where can you learn more? Go to http://birgit-rashaun.info/. Enter Q188 in the search box to learn more about \"Leg and Ankle Edema: Care Instructions. \" Current as of: March 20, 2017 Content Version: 11.4 © 2594-9732 PlanetTran. Care instructions adapted under license by [employer] (which disclaims liability or warranty for this information). If you have questions about a medical condition or this instruction, always ask your healthcare professional. [place] [address] any warranty or liability for your use of this information. Introducing \[facility] [place] [facility]\"" & HEALTH SERVICES! [employer] introduces [employer] patient portal. Now you can access parts of your medical record, email your doctor's office, and request medication refills online. 1. In your internet browser, go to [email or web address] 2. Click on the First Time User? Click Here link in the Sign In box. You will see the New Member Sign Up page. 3. Enter your [employer] Access Code exactly as it appears below. You will not need to use this code after youve completed the sign-up process. If you do not sign up before the expiration date, you must request a new code. · [employer] Access Code: [email or web address] Expires: 7/30/2018  9:54 AM 
 
4. Enter the last four digits of your Social Security Number (xxxx) and Date of Birth (mm/dd/yyyy) as indicated and click Submit. You will be taken to the next sign-up page. 5. Create a [employer] ID. This will be your [employer] login ID and cannot be changed, so think of one that is secure and easy to remember. 6. Create a DashThis password. You can change your password at any time. 7. Enter your Password Reset Question and Answer. This can be used at a later time if you forget your password. 8. Enter your e-mail address. You will receive e-mail notification when new information is available in [address] 9. Click Sign Up. You can now view and download portions of your medical record. 10. Click the Download Summary menu link to download a portable copy of your medical information. If you have questions, please visit the Frequently Asked Questions section of the DashThis website. Remember, DashThis is NOT to be used for urgent needs. For medical emergencies, dial 911. Now available from your iPhone and Android! Please provide this summary of care documentation to your next provider. Your primary care clinician is listed as [address]. If you have any questions after today's visit, please call [phone].

## 2018-05-01 NOTE — PATIENT INSTRUCTIONS
Leg and Ankle Edema: Care Instructions  Your Care Instructions  Swelling in the legs, ankles, and feet is called edema. It is common after you sit or stand for a while. Long plane flights or car rides often cause swelling in the legs and feet. You may also have swelling if you have to stand for long periods of time at your job. Problems with the veins in the legs (varicose veins) and changes in hormones can also cause swelling. Sometimes the swelling in the ankles and feet is caused by a more serious problem, such as heart failure, infection, blood clots, or liver or kidney disease. Follow-up care is a key part of your treatment and safety. Be sure to make and go to all appointments, and call your doctor if you are having problems. It's also a good idea to know your test results and keep a list of the medicines you take. How can you care for yourself at home? · If your doctor gave you medicine, take it as prescribed. Call your doctor if you think you are having a problem with your medicine. · Whenever you are resting, raise your legs up. Try to keep the swollen area higher than the level of your heart. · Take breaks from standing or sitting in one position. ¨ Walk around to increase the blood flow in your lower legs. ¨ Move your feet and ankles often while you stand, or tighten and relax your leg muscles. · Wear support stockings. Put them on in the morning, before swelling gets worse. · Eat a balanced diet. Lose weight if you need to. · Limit the amount of salt (sodium) in your diet. Salt holds fluid in the body and may increase swelling. When should you call for help? Call 911 anytime you think you may need emergency care. For example, call if:  ? · You have symptoms of a blood clot in your lung (called a pulmonary embolism). These may include:  ¨ Sudden chest pain. ¨ Trouble breathing. ¨ Coughing up blood.    ?Call your doctor now or seek immediate medical care if:  ? · You have signs of a blood clot, such as:  ¨ Pain in your calf, back of the knee, thigh, or groin. ¨ Redness and swelling in your leg or groin. ? · You have symptoms of infection, such as:  ¨ Increased pain, swelling, warmth, or redness. ¨ Red streaks or pus. ¨ A fever. ? Watch closely for changes in your health, and be sure to contact your doctor if:  ? · Your swelling is getting worse. ? · You have new or worsening pain in your legs. ? · You do not get better as expected. Where can you learn more? Go to http://birgit-rashaun.info/. Enter Z779 in the search box to learn more about \"Leg and Ankle Edema: Care Instructions. \"  Current as of: March 20, 2017  Content Version: 11.4  © 3239-1281 Premium Store. Care instructions adapted under license by [employer]noMed (which disclaims liability or warranty for this information). If you have questions about a medical condition or this instruction, always ask your healthcare professional. [place] [address] any warranty or liability for your use of this information.

## 2018-05-01 NOTE — PROGRESS NOTES
HISTORY OF PRESENT ILLNESS  Chavo Figueredo is a 61 y.o. male. HPI  Patient comes in today for  Patient states he has been walking 3 miles several times a week. Has been cutting grass and bowling. States he feels the best he has felt in a long time. Patient was given metformin for diabetes, but did not take. Wanted to work on diet and exercise to see if numbers go down. Cut out red meats, pork, following a low sodium diet. Has been belching more, especially after eating or drinking. Drinking a carbonated beverage will help him belch. Allergies   Allergen Reactions    Ibuprofen Other (comments)     Increase B/P       Past Medical History:   Diagnosis Date    Atrial flutter ([address] 1/18/2018    Atrial flutter ([address] 1/18/2018    Cerumen impaction 10/14/2013    Controlled type 2 diabetes mellitus without complication, without long-term current use of insulin ([address]     Controlled type 2 diabetes mellitus without complication, without long-term current use of insulin ([address] 1/29/2018    Elevated blood pressure 10/14/2013    Hyperlipidemia with target LDL less than 100 11/26/2013    Obesity, unspecified 10/10/2013    Pulmonary emboli ([address] 01/19/2018    [facility] -    Pulmonary embolism ([facility] 1/16/2018    Vitamin D deficiency 1/29/2018       History reviewed. No pertinent surgical history. Social History     Social History    Marital status:      Spouse name: N/A    Number of children: N/A    Years of education: N/A     Occupational History    Not on file.      Social History Main Topics    Smoking status: Never Smoker    Smokeless tobacco: Never Used    Alcohol use No    Drug use: No    Sexual activity: Yes     Partners: Female     Other Topics Concern    Not on file     Social History Narrative     to [employer]       Family History   Problem Relation Age of Onset    Hypertension Mother     Hypertension Father        Current Outpatient Prescriptions   Medication Sig    cyanocobalamin 1,000 mcg tablet Take 1,000 mcg by mouth daily.  omega 3-dha-epa-fish oil (FISH OIL) 100-160-1,000 mg cap Take  by mouth.  apixaban (ELIQUIS) 5 mg tablet Take 1 Tab by mouth two (2) times a day.  metoprolol tartrate (LOPRESSOR) 50 mg tablet TAKE 1 TABLET BY MOUTH EVERY 12 HOURS     No current facility-administered medications for this visit. Review of Systems   Constitutional: Negative for chills, diaphoresis, fever, malaise/fatigue and weight loss. HENT: Negative for congestion, ear pain, sore throat and tinnitus. Eyes: Negative for blurred vision and double vision. Respiratory: Negative for cough, sputum production, shortness of breath and wheezing. Cardiovascular: Negative for chest pain, palpitations and leg swelling. Gastrointestinal: Negative for abdominal pain, blood in stool, constipation, diarrhea, nausea and vomiting. Genitourinary: Negative for dysuria, flank pain, frequency, hematuria and urgency. Musculoskeletal: Negative for back pain, joint pain and myalgias. Skin: Negative. Neurological: Negative for dizziness, tingling, speech change, focal weakness and headaches. Psychiatric/Behavioral: Negative for depression. The patient is not nervous/anxious and does not have insomnia. Vitals:    05/01/18 0902   BP: 144/81   Pulse: (!) 55   Resp: 18   Temp: 96.6 °F (35.9 °C)   TempSrc: Oral   SpO2: 97%   Weight: 271 lb (122.9 kg)   Height: 6' (1.829 m)     Physical Exam   Constitutional: He is oriented to person, place, and time. Vital signs are normal. He appears well-developed and well-nourished. He is cooperative.    HENT:   Right Ear: Hearing, tympanic membrane, external ear and ear canal normal.   Left Ear: Hearing, tympanic membrane, external ear and ear canal normal.   Nose: Nose normal.   Mouth/Throat: Uvula is midline, oropharynx is clear and moist and mucous membranes are normal.   Cardiovascular: Normal rate, regular rhythm and normal heart sounds. Pulmonary/Chest: Effort normal and breath sounds normal.   Abdominal: Soft. Normal appearance and bowel sounds are normal. There is no hepatosplenomegaly. There is no tenderness. There is no CVA tenderness. Neurological: He is alert and oriented to person, place, and time. Skin: Skin is warm, dry and intact. Psychiatric: He has a normal mood and affect. His behavior is normal. Judgment and thought content normal.     ASSESSMENT and PLAN    ICD-10-CM ICD-9-CM    1. Controlled type 2 diabetes mellitus without complication, without long-term current use of insulin (HCC) E11.9 250.00 AMB POC HEMOGLOBIN C1O      METABOLIC PANEL, BASIC   2. Saddle embolus of pulmonary artery without acute cor pulmonale, unspecified chronicity ([facility]) I26.92 415.13    3. Bilateral edema of lower extremity R60.0 782.3    4. History of cardioversion Z98.890 V15.1    5. Severe obesity (BMI 35.0-39. 9) with comorbidity ([facility] E66.01 278.01    6. Vitamin D deficiency E55.9 268.9 VITAMIN D, 25 HYDROXY     Encounter Diagnoses   Name Primary?  Controlled type 2 diabetes mellitus without complication, without long-term current use of insulin ([facility]) Yes    Saddle embolus of pulmonary artery without acute cor pulmonale, unspecified chronicity ([facility])     Bilateral edema of lower extremity     History of cardioversion     Severe obesity (BMI 35.0-39. 9) with comorbidity ([facility]     Vitamin D deficiency      Orders Placed This Encounter    VITAMIN D, 25 HYDROXY    METABOLIC PANEL, BASIC    AMB POC HEMOGLOBIN A1C    cyanocobalamin 1,000 mcg tablet    omega 3-dha-epa-fish oil (FISH OIL) 100-160-1,000 mg cap     Diagnoses and all orders for this visit:    1. Controlled type 2 diabetes mellitus without complication, without long-term current use of insulin ([facility] - patient did not start metformin after last visit, A1c 6.3% today, improved from 6.6%.   Continue with diet changes  -     AMB POC HEMOGLOBIN U3E  -     METABOLIC PANEL, BASIC    2. Saddle embolus of pulmonary artery without acute cor pulmonale, unspecified chronicity (HCC) - on Eliquis, being seen by hematology    3. Bilateral edema of lower extremity - encouraged use of compression stockings, elevate legs, low sodium diet    4. History of cardioversion - patient is very apprehensive about performing any type of exercise or lifting weights thinking he will cause atrial fibrillation. Encouraged patient to participate in moderate walking and may start     5. Severe obesity (BMI 35.0-39. 9) with comorbidity ([address]  -    I have reviewed/discussed the above normal BMI with the patient. I have recommended the following interventions: dietary management education, guidance, and counseling and encourage exercise . Rossy Jimenez 6. Vitamin D deficiency  -     VITAMIN D, 25 HYDROXY      Follow-up Disposition:  Return in about 6 months (around 11/1/2018). lab results and schedule of future lab studies reviewed with patient  reviewed diet, exercise and weight control    I have reviewed the patient's allergies and made any necessary changes. Medical, procedural, social and family histories have been reviewed and updated as medically indicated. I have reconciled and/or revised patient medications in the EMR. I have discussed each diagnosis listed in this note with Rufus Forman and/or their family. I have discussed treatment options and the risk/benefit analysis of those options, including safe use of medications and possible medication side effects. Through the use of shared decision making we have agreed to the above plan. The patient has received an after-visit summary and questions were answered concerning future plans. Anjali Trevizo, JUDAH    This note will not be viewable in [employer].

## 2018-05-02 LAB
25(OH)D3+25(OH)D2 SERPL-MCNC: 27.2 NG/ML (ref 30–100)
BUN SERPL-MCNC: 10 MG/DL (ref 6–24)
BUN/CREAT SERPL: 8 (ref 9–20)
CALCIUM SERPL-MCNC: 10 MG/DL (ref 8.7–10.2)
CHLORIDE SERPL-SCNC: 104 MMOL/L (ref 96–106)
CO2 SERPL-SCNC: 25 MMOL/L (ref 18–29)
CREAT SERPL-MCNC: 1.19 MG/DL (ref 0.76–1.27)
GFR SERPLBLD CREATININE-BSD FMLA CKD-EPI: 67 ML/MIN/1.73
GFR SERPLBLD CREATININE-BSD FMLA CKD-EPI: 77 ML/MIN/1.73
GLUCOSE SERPL-MCNC: 133 MG/DL (ref 65–99)
POTASSIUM SERPL-SCNC: 4.6 MMOL/L (ref 3.5–5.2)
SODIUM SERPL-SCNC: 144 MMOL/L (ref 134–144)

## 2018-05-14 RX ORDER — METOPROLOL TARTRATE 50 MG/1
TABLET ORAL
Qty: 60 TAB | Refills: 0 | Status: SHIPPED | OUTPATIENT
Start: 2018-05-14 | End: 2018-06-12 | Stop reason: SDUPTHER

## 2018-06-01 ENCOUNTER — OFFICE VISIT (OUTPATIENT)
Dept: ONCOLOGY | Age: 58
End: 2018-06-01

## 2018-06-01 VITALS
SYSTOLIC BLOOD PRESSURE: 158 MMHG | HEIGHT: 72 IN | HEART RATE: 57 BPM | BODY MASS INDEX: 36.82 KG/M2 | RESPIRATION RATE: 16 BRPM | WEIGHT: 271.8 LBS | TEMPERATURE: 97.8 F | DIASTOLIC BLOOD PRESSURE: 94 MMHG

## 2018-06-01 DIAGNOSIS — I26.92 ACUTE SADDLE PULMONARY EMBOLISM WITHOUT ACUTE COR PULMONALE (HCC): Primary | ICD-10-CM

## 2018-06-01 NOTE — PROGRESS NOTES
Eric Peres is a 61 y.o. male here today for PE; unprovoked saddle f/u. Patient on Eliquis. Eleavted B/P noted; pt stated \"I took my B/P medication this am\" and low pulse noted; pt states my pule is always low; other VS stable. Patient denies pain. Patient denies cough. Patient denies SOB. Visit Vitals    BP (!) 158/94 (BP 1 Location: Left arm, BP Patient Position: Sitting)    Pulse (!) 57    Temp 97.8 °F (36.6 °C) (Oral)    Resp 16    Ht 6' (1.829 m)    Wt 271 lb 12.8 oz (123.3 kg)    BMI 36.86 kg/m2     Health Maintenance Review: Patient reminded of \"due or due soon\" health maintenance. I have asked the patient to contact his/her primary care provider (PCP) for follow-up on his/her health maintenance.

## 2018-06-01 NOTE — PROGRESS NOTES
Hematology Follow Up        Patient: Amber Boo MRN: [id number]  SSN: [id number]    YOB: 1960  Age: 61 y.o. Sex: male      Subjective:      Amber Boo is a 61 y.o. male who I saw in the hospital for a new diagnosis of PE. 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 He has been taking Apixaban. He continues to have some exertional dyspnea but it is improving. No bleeding. He has also been diagnosed with A. Fib/A. flutter and is s/p MIQUEL/DCCV and remains in sinus rhythm. Review of Systems:    Constitutional: negative  Eyes: negative  Ears, Nose, Mouth, Throat, and Face: negative  Respiratory: dyspnea  Cardiovascular: negative  Gastrointestinal: negative  Genitourinary:negative  Integument/Breast: negative  Hematologic/Lymphatic: negative  Musculoskeletal:negative  Neurological: negative          Past Medical History:   Diagnosis Date    Atrial flutter ([address] 1/18/2018    Atrial flutter ([address] 1/18/2018    Cerumen impaction 10/14/2013    Controlled type 2 diabetes mellitus without complication, without long-term current use of insulin ([address]     Controlled type 2 diabetes mellitus without complication, without long-term current use of insulin ([address] 1/29/2018    Elevated blood pressure 10/14/2013    Hyperlipidemia with target LDL less than 100 11/26/2013    Obesity, unspecified 10/10/2013    Pulmonary emboli ([address] 01/19/2018    [facility] -    Pulmonary embolism ([facility] 1/16/2018    Vitamin D deficiency 1/29/2018     No past surgical history on file. Family History   Problem Relation Age of Onset    Hypertension Mother     Hypertension Father      Social History   Substance Use Topics    Smoking status: Never Smoker    Smokeless tobacco: Never Used    Alcohol use No      Prior to Admission medications    Medication Sig Start Date End Date Taking?  Authorizing Provider   metoprolol tartrate (LOPRESSOR) 50 mg tablet TAKE 1 TABLET BY MOUTH EVERY 12 HOURS 5/14/18  Yes Yadira Estrella MD   omega 8-tfi-cxa-fish oil (FISH OIL) 100-160-1,000 mg cap Take  by mouth. Yes Historical Provider   cholecalciferol (VITAMIN D3) 1,000 unit cap Take  by mouth daily. Yes Historical Provider   apixaban (ELIQUIS) 5 mg tablet Take 1 Tab by mouth two (2) times a day. 3/15/18  Yes Emanuel Duran MD   metoprolol tartrate (LOPRESSOR) 50 mg tablet TAKE 1 TABLET BY MOUTH EVERY 12 HOURS 2/16/18  Yes Adrian Toyn NP   cyanocobalamin 1,000 mcg tablet Take 1,000 mcg by mouth daily. Historical Provider              Allergies   Allergen Reactions    Ibuprofen Other (comments)     Increase B/P           Objective:     Vitals:    06/01/18 0951   BP: (!) 158/94   Pulse: (!) 57   Resp: 16   Temp: 97.8 °F (36.6 °C)   TempSrc: Oral   Weight: 271 lb 12.8 oz (123.3 kg)   Height: 6' (1.829 m)            Physical Exam:    GENERAL: alert, cooperative, no distress, appears stated age  EYE: negative  LYMPHATIC: Cervical, supraclavicular, and axillary nodes normal.   THROAT & NECK: normal and no erythema or exudates noted. LUNG: clear to auscultation bilaterally  HEART: regular rate and rhythm  ABDOMEN: soft, non-tender  EXTREMITIES:  no edema  SKIN: Normal.  NEUROLOGIC: negative            Assessment:     1. Pulmonary embolism:    > 1st episode/event of  PE in 01/2018 - unprovoked    On Apixaban  Dyspnea on exertion +. Improving. He has since completed his 6 months of systemic anticoagulation. He has A Fib/Flutter. He will also continue Apixaban because of A. Fib/A. Flutter until his appointment with Dr. Edwin Quinonez in November. Plan:       > Continue systemic anticoagulation with Apixaban  > F/U with Dr. Edwin Quinonez in November  > Return in December 2018      Signed by: Iva De León MD                     June 1, 2018          CCRichard Joya NP

## 2018-06-12 DIAGNOSIS — I26.92 ACUTE SADDLE PULMONARY EMBOLISM WITHOUT ACUTE COR PULMONALE (HCC): ICD-10-CM

## 2018-06-13 RX ORDER — METOPROLOL TARTRATE 50 MG/1
TABLET ORAL
Qty: 60 TAB | Refills: 0 | Status: SHIPPED | OUTPATIENT
Start: 2018-06-13 | End: 2018-07-11 | Stop reason: SDUPTHER

## 2018-06-13 RX ORDER — APIXABAN 5 MG/1
TABLET, FILM COATED ORAL
Qty: 60 TAB | Refills: 0 | Status: SHIPPED | OUTPATIENT
Start: 2018-06-13 | End: 2018-07-18 | Stop reason: SDUPTHER

## 2018-07-11 RX ORDER — METOPROLOL TARTRATE 50 MG/1
TABLET ORAL
Qty: 60 TAB | Refills: 0 | Status: SHIPPED | OUTPATIENT
Start: 2018-07-11 | End: 2018-08-07 | Stop reason: SDUPTHER

## 2018-07-18 ENCOUNTER — DOCUMENTATION ONLY (OUTPATIENT)
Dept: ONCOLOGY | Age: 58
End: 2018-07-18

## 2018-07-18 NOTE — PROGRESS NOTES
adi Clemons, refill Eliquis 5 mg tab, take one tab BID, disp #60, refills x1. Pt will follow up with cardiology for Afib.

## 2018-08-07 RX ORDER — METOPROLOL TARTRATE 50 MG/1
TABLET ORAL
Qty: 60 TAB | Refills: 0 | Status: SHIPPED | OUTPATIENT
Start: 2018-08-07 | End: 2018-09-03 | Stop reason: SDUPTHER

## 2018-08-26 ENCOUNTER — APPOINTMENT (OUTPATIENT)
Dept: GENERAL RADIOLOGY | Age: 58
End: 2018-08-26
Attending: EMERGENCY MEDICINE
Payer: COMMERCIAL

## 2018-08-26 ENCOUNTER — APPOINTMENT (OUTPATIENT)
Dept: CT IMAGING | Age: 58
End: 2018-08-26
Attending: EMERGENCY MEDICINE
Payer: COMMERCIAL

## 2018-08-26 ENCOUNTER — HOSPITAL ENCOUNTER (EMERGENCY)
Age: 58
Discharge: HOME | End: 2018-08-26
Attending: EMERGENCY MEDICINE
Payer: COMMERCIAL

## 2018-08-26 VITALS
BODY MASS INDEX: 36.49 KG/M2 | OXYGEN SATURATION: 98 % | WEIGHT: 269.4 LBS | RESPIRATION RATE: 16 BRPM | SYSTOLIC BLOOD PRESSURE: 148 MMHG | HEIGHT: 72 IN | HEART RATE: 52 BPM | DIASTOLIC BLOOD PRESSURE: 78 MMHG | TEMPERATURE: 98.3 F

## 2018-08-26 DIAGNOSIS — R07.89 CHEST DISCOMFORT: Primary | ICD-10-CM

## 2018-08-26 LAB
ALBUMIN SERPL-MCNC: 3.7 G/DL (ref 3.5–5)
ALBUMIN/GLOB SERPL: 0.9 {RATIO} (ref 1.1–2.2)
ALP SERPL-CCNC: 90 U/L (ref 45–117)
ALT SERPL-CCNC: 24 U/L (ref 12–78)
ANION GAP SERPL CALC-SCNC: 7 MMOL/L (ref 5–15)
AST SERPL-CCNC: 16 U/L (ref 15–37)
BASOPHILS # BLD: 0 K/UL (ref 0–0.1)
BASOPHILS NFR BLD: 0 % (ref 0–1)
BILIRUB SERPL-MCNC: 0.4 MG/DL (ref 0.2–1)
BUN SERPL-MCNC: 14 MG/DL (ref 6–20)
BUN/CREAT SERPL: 11 (ref 12–20)
CALCIUM SERPL-MCNC: 9.1 MG/DL (ref 8.5–10.1)
CHLORIDE SERPL-SCNC: 105 MMOL/L (ref 97–108)
CO2 SERPL-SCNC: 27 MMOL/L (ref 21–32)
CREAT SERPL-MCNC: 1.23 MG/DL (ref 0.7–1.3)
DIFFERENTIAL METHOD BLD: ABNORMAL
EOSINOPHIL # BLD: 0.1 K/UL (ref 0–0.4)
EOSINOPHIL NFR BLD: 1 % (ref 0–7)
ERYTHROCYTE [DISTWIDTH] IN BLOOD BY AUTOMATED COUNT: 12.2 % (ref 11.5–14.5)
GLOBULIN SER CALC-MCNC: 4 G/DL (ref 2–4)
GLUCOSE SERPL-MCNC: 97 MG/DL (ref 65–100)
HCT VFR BLD AUTO: 46.7 % (ref 36.6–50.3)
HGB BLD-MCNC: 15.4 G/DL (ref 12.1–17)
IMM GRANULOCYTES # BLD: 0 K/UL (ref 0–0.04)
IMM GRANULOCYTES NFR BLD AUTO: 0 % (ref 0–0.5)
LYMPHOCYTES # BLD: 3.8 K/UL (ref 0.8–3.5)
LYMPHOCYTES NFR BLD: 52 % (ref 12–49)
MCH RBC QN AUTO: 30.3 PG (ref 26–34)
MCHC RBC AUTO-ENTMCNC: 33 G/DL (ref 30–36.5)
MCV RBC AUTO: 91.9 FL (ref 80–99)
MONOCYTES # BLD: 0.8 K/UL (ref 0–1)
MONOCYTES NFR BLD: 11 % (ref 5–13)
NEUTS SEG # BLD: 2.6 K/UL (ref 1.8–8)
NEUTS SEG NFR BLD: 36 % (ref 32–75)
NRBC # BLD: 0 K/UL (ref 0–0.01)
NRBC BLD-RTO: 0 PER 100 WBC
PLATELET # BLD AUTO: 265 K/UL (ref 150–400)
PMV BLD AUTO: 9.7 FL (ref 8.9–12.9)
POTASSIUM SERPL-SCNC: 3.9 MMOL/L (ref 3.5–5.1)
PROT SERPL-MCNC: 7.7 G/DL (ref 6.4–8.2)
RBC # BLD AUTO: 5.08 M/UL (ref 4.1–5.7)
SODIUM SERPL-SCNC: 139 MMOL/L (ref 136–145)
TROPONIN I SERPL-MCNC: <0.05 NG/ML
WBC # BLD AUTO: 7.4 K/UL (ref 4.1–11.1)

## 2018-08-26 PROCEDURE — 36415 COLL VENOUS BLD VENIPUNCTURE: CPT | Performed by: EMERGENCY MEDICINE

## 2018-08-26 PROCEDURE — 74011250636 HC RX REV CODE- 250/636: Performed by: EMERGENCY MEDICINE

## 2018-08-26 PROCEDURE — 93005 ELECTROCARDIOGRAM TRACING: CPT

## 2018-08-26 PROCEDURE — 85025 COMPLETE CBC W/AUTO DIFF WBC: CPT | Performed by: EMERGENCY MEDICINE

## 2018-08-26 PROCEDURE — 71045 X-RAY EXAM CHEST 1 VIEW: CPT

## 2018-08-26 PROCEDURE — 80053 COMPREHEN METABOLIC PANEL: CPT | Performed by: EMERGENCY MEDICINE

## 2018-08-26 PROCEDURE — 99284 EMERGENCY DEPT VISIT MOD MDM: CPT

## 2018-08-26 PROCEDURE — 84484 ASSAY OF TROPONIN QUANT: CPT | Performed by: EMERGENCY MEDICINE

## 2018-08-26 PROCEDURE — 74011636320 HC RX REV CODE- 636/320: Performed by: EMERGENCY MEDICINE

## 2018-08-26 PROCEDURE — 71275 CT ANGIOGRAPHY CHEST: CPT

## 2018-08-26 RX ORDER — SODIUM CHLORIDE 0.9 % (FLUSH) 0.9 %
10 SYRINGE (ML) INJECTION
Status: COMPLETED | OUTPATIENT
Start: 2018-08-26 | End: 2018-08-26

## 2018-08-26 RX ORDER — SODIUM CHLORIDE 9 MG/ML
50 INJECTION, SOLUTION INTRAVENOUS
Status: COMPLETED | OUTPATIENT
Start: 2018-08-26 | End: 2018-08-26

## 2018-08-26 RX ADMIN — SODIUM CHLORIDE 50 ML/HR: 900 INJECTION, SOLUTION INTRAVENOUS at 19:03

## 2018-08-26 RX ADMIN — Medication 10 ML: at 19:03

## 2018-08-26 RX ADMIN — IOPAMIDOL 100 ML: 755 INJECTION, SOLUTION INTRAVENOUS at 19:02

## 2018-08-26 NOTE — ED NOTES
Bedside report received from Nguyễn, [facility]. Assumed care of patient. Patient placed in position of comfort. Call bell in reach.

## 2018-08-26 NOTE — ED PROVIDER NOTES
EMERGENCY DEPARTMENT HISTORY AND PHYSICAL EXAM      Date: 8/26/2018  Patient Name: Natalia Kelly    History of Presenting Illness     Chief Complaint   Patient presents with    Chest Pain     pt reports intermittent chest pain and tingling in R arm x 1 week       History Provided By: Patient    HPI: Natalia Kelly, 60 y.o. male with PMHx significant for PE, Atrial flutter, DM, presents ambulatory to the ED with cc of new onset intermittent right arm tingling for the past week. His symptoms onset while he was running a couple miles, thought he overexerted himself. He denies any radiation of his symptoms. He does endorse chest tightness which is unchanged with exertion. He is being treated for a PE and Atrial flutter. He is on daily eliquis. Denies any long car or plane rides, recent surgeries. He denies any SOB. He is concerned for a new blood clot. Denies any new leg swelling. Social Hx: - Tobacco (-), - EtOH (-), - illicit drug use (-)    There are no other complaints, changes, or physical findings at this time. PCP: Araceli Olson NP    Current Outpatient Prescriptions   Medication Sig Dispense Refill    metoprolol tartrate (LOPRESSOR) 50 mg tablet TAKE 1 TABLET BY MOUTH EVERY 12 HOURS 60 Tab 0    apixaban (ELIQUIS) 5 mg tablet TAKE 1 TABLET BY MOUTH TWICE DAILY 60 Tab 1    cyanocobalamin 1,000 mcg tablet Take 1,000 mcg by mouth daily.  omega 3-dha-epa-fish oil (FISH OIL) 100-160-1,000 mg cap Take  by mouth.  cholecalciferol (VITAMIN D3) 1,000 unit cap Take  by mouth daily.       metoprolol tartrate (LOPRESSOR) 50 mg tablet TAKE 1 TABLET BY MOUTH EVERY 12 HOURS 60 Tab 6       Past History     Past Medical History:  Past Medical History:   Diagnosis Date    Atrial flutter ([address] 1/18/2018    Atrial flutter ([address] 1/18/2018    Cerumen impaction 10/14/2013    Controlled type 2 diabetes mellitus without complication, without long-term current use of insulin ([address]     Controlled type 2 diabetes mellitus without complication, without long-term current use of insulin ([facility] 1/29/2018    Elevated blood pressure 10/14/2013    Hyperlipidemia with target LDL less than 100 11/26/2013    Obesity, unspecified 10/10/2013    Pulmonary emboli ([facility] 01/19/2018    [facility] -    Pulmonary embolism ([facility] 1/16/2018    Vitamin D deficiency 1/29/2018       Past Surgical History:  No past surgical history on file. Family History:  Family History   Problem Relation Age of Onset    Hypertension Mother     Hypertension Father        Social History:  Social History   Substance Use Topics    Smoking status: Never Smoker    Smokeless tobacco: Never Used    Alcohol use No       Allergies: Allergies   Allergen Reactions    Ibuprofen Other (comments)     Increase B/P         Review of Systems   Review of Systems   Constitutional: Negative. Negative for chills, diaphoresis and fever. HENT: Negative. Negative for congestion, ear pain, sore throat and trouble swallowing. Eyes: Negative. Negative for photophobia, pain, redness and visual disturbance. Respiratory: Positive for chest tightness. Negative for cough, shortness of breath and wheezing. Cardiovascular: Negative. Negative for chest pain and palpitations. Gastrointestinal: Negative. Negative for abdominal pain, blood in stool, constipation, diarrhea, nausea and vomiting. Genitourinary: Negative for dysuria and frequency. Musculoskeletal: Negative. Negative for back pain, joint swelling and neck pain. Skin: Negative. Neurological: Negative for seizures, syncope, weakness, numbness and headaches.        + right arm tingling   Psychiatric/Behavioral: Negative. Negative for behavioral problems and confusion. The patient is not nervous/anxious. All other systems reviewed and are negative. Physical Exam   Physical Exam   Constitutional: He is oriented to person, place, and time. He appears well-developed and well-nourished. HENT:   Head: Normocephalic and atraumatic. Eyes: Conjunctivae and EOM are normal.   Neck: Normal range of motion. Neck supple. Cardiovascular: Normal rate and regular rhythm. Pulmonary/Chest: Effort normal and breath sounds normal. No respiratory distress. He exhibits no tenderness. Abdominal: Soft. He exhibits no distension. There is no tenderness. Musculoskeletal: Normal range of motion. He exhibits edema. Trace pitting edema bilaterally   Neurological: He is alert and oriented to person, place, and time. Skin: Skin is warm and dry. Psychiatric: He has a normal mood and affect. Nursing note and vitals reviewed.       Diagnostic Study Results     Labs -     Recent Results (from the past 12 hour(s))   EKG, 12 LEAD, INITIAL    Collection Time: 08/26/18  5:09 PM   Result Value Ref Range    Ventricular Rate 57 BPM    Atrial Rate 57 BPM    P-R Interval 196 ms    QRS Duration 104 ms    Q-T Interval 428 ms    QTC Calculation (Bezet) 416 ms    Calculated P Axis 27 degrees    Calculated R Axis -46 degrees    Calculated T Axis 11 degrees    Diagnosis       Sinus bradycardia  Possible Left atrial enlargement  Incomplete right bundle branch block  Left anterior fascicular block  Nonspecific T wave abnormality  When compared with ECG of 23-FEB-2018 09:41,  Incomplete right bundle branch block is now present     CBC WITH AUTOMATED DIFF    Collection Time: 08/26/18  5:40 PM   Result Value Ref Range    WBC 7.4 4.1 - 11.1 K/uL    RBC 5.08 4.10 - 5.70 M/uL    HGB 15.4 12.1 - 17.0 g/dL    HCT 46.7 36.6 - 50.3 %    MCV 91.9 80.0 - 99.0 FL    MCH 30.3 26.0 - 34.0 PG    MCHC 33.0 30.0 - 36.5 g/dL    RDW 12.2 11.5 - 14.5 %    PLATELET 188 632 - 633 K/uL    MPV 9.7 8.9 - 12.9 FL    NRBC 0.0 0  WBC    ABSOLUTE NRBC 0.00 0.00 - 0.01 K/uL    NEUTROPHILS 36 32 - 75 %    LYMPHOCYTES 52 (H) 12 - 49 %    MONOCYTES 11 5 - 13 %    EOSINOPHILS 1 0 - 7 %    BASOPHILS 0 0 - 1 %    IMMATURE GRANULOCYTES 0 0.0 - 0.5 % ABS. NEUTROPHILS 2.6 1.8 - 8.0 K/UL    ABS. LYMPHOCYTES 3.8 (H) 0.8 - 3.5 K/UL    ABS. MONOCYTES 0.8 0.0 - 1.0 K/UL    ABS. EOSINOPHILS 0.1 0.0 - 0.4 K/UL    ABS. BASOPHILS 0.0 0.0 - 0.1 K/UL    ABS. IMM. GRANS. 0.0 0.00 - 0.04 K/UL    DF AUTOMATED     METABOLIC PANEL, COMPREHENSIVE    Collection Time: 08/26/18  5:40 PM   Result Value Ref Range    Sodium 139 136 - 145 mmol/L    Potassium 3.9 3.5 - 5.1 mmol/L    Chloride 105 97 - 108 mmol/L    CO2 27 21 - 32 mmol/L    Anion gap 7 5 - 15 mmol/L    Glucose 97 65 - 100 mg/dL    BUN 14 6 - 20 MG/DL    Creatinine 1.23 0.70 - 1.30 MG/DL    BUN/Creatinine ratio 11 (L) 12 - 20      GFR est AA >60 >60 ml/min/1.73m2    GFR est non-AA >60 >60 ml/min/1.73m2    Calcium 9.1 8.5 - 10.1 MG/DL    Bilirubin, total 0.4 0.2 - 1.0 MG/DL    ALT (SGPT) 24 12 - 78 U/L    AST (SGOT) 16 15 - 37 U/L    Alk. phosphatase 90 45 - 117 U/L    Protein, total 7.7 6.4 - 8.2 g/dL    Albumin 3.7 3.5 - 5.0 g/dL    Globulin 4.0 2.0 - 4.0 g/dL    A-G Ratio 0.9 (L) 1.1 - 2.2     TROPONIN I    Collection Time: 08/26/18  5:40 PM   Result Value Ref Range    Troponin-I, Qt. <0.05 <0.05 ng/mL       Radiologic Studies -   CT Results  (Last 48 hours)               08/26/18 1903  CTA CHEST W OR W WO CONT Final result    Impression:  IMPRESSION:   1. Tiny linear filling defect residual in the right lower lobe pulmonary artery   branch, with a significant decrease in clot burden in the right pulmonary artery   since the prior study 2/5/2018. 2. No new pulmonary embolus. .           Narrative:  EXAM: CT ANGIOGRAPHY CHEST        INDICATION:  Shortness of breath produced by exertion or stress; hx of PE with   chest discomfort and sob. Chest pain and right upper extremity paresthesia for   one week. COMPARISON: 2/5/2018. CONTRAST: 100 mL of Isovue-370. TECHNIQUE:    Precontrast  images were obtained to localize the volume for acquisition.    Multislice helical CT arteriography was performed from the diaphragm to the   thoracic inlet during uneventful rapid bolus intravenous contrast   administration. Lung and soft tissue windows were generated. Coronal and   sagittal  reformatted images were also generated. 3D post processing consisting   of coronal maximum intensity projection images was performed. CT dose reduction   was achieved through use of a standardized protocol tailored for this   examination and automatic exposure control for dose modulation. FINDINGS:   The lungs are clear of mass, nodule, airspace disease or edema. The right pulmonary artery shows minimal residual linear filling defect in the   lower lobe branch with a significant reduction in clot burden since the prior   study. There is no new pulmonary embolus otherwise. The left pulmonary artery   remains normal..       There is no mediastinal or hilar adenopathy or mass. The aorta enhances normally   without evidence of aneurysm or dissection. The visualized portions of the upper abdominal organs are normal. Stable   sclerotic lesion in a right mid rib laterally. CXR Results  (Last 48 hours)               08/26/18 1747  XR CHEST PORT Final result    Impression:  IMPRESSION:   No acute finding       Narrative:  INDICATION: Chest pain       COMPARISON: 2/5/2018       A single frontal view was obtained. The time of this study is 1743 hours. Lungs   are clear. The heart and mediastinal shadows are normal.                            Medical Decision Making   I am the first provider for this patient. I reviewed the vital signs, available nursing notes, past medical history, past surgical history, family history and social history. Vital Signs-Reviewed the patient's vital signs.   Patient Vitals for the past 12 hrs:   Temp Pulse Resp BP SpO2   08/26/18 1915 - (!) 52 16 148/78 98 %   08/26/18 1911 - - - 143/88 -   08/26/18 1830 - (!) 53 18 142/82 95 %   08/26/18 1741 - (!) 54 18 140/82 96 %   08/26/18 1702 98.3 °F (36.8 °C) 61 16 (!) 153/98 99 %     EKG interpretation: (Preliminary)  17:09  Rhythm: sinus bradycardia; and regular . Rate (approx.): 57; Axis: normal; DC interval: normal; QRS interval: normal ; ST/T wave: old flat Ts AVF. As interpreted by Rigo Cabral M.D    Records Reviewed: Nursing Notes, Old Medical Records, Previous Radiology Studies and Previous Laboratory Studies    Provider Notes (Medical Decision Making):   Patient presents with Chest discomfort. DDx:  ACS, Aortic dissection, PNA, PE, PTX, pericarditis, myocarditis, GERD, costochondritis, anxiety. Will obtain labs, CXR, EKG and get Cardiology Consult PRN. Given hx of PE will get a CT scan. ED Course:   Initial assessment performed. The patients presenting problems have been discussed, and they are in agreement with the care plan formulated and outlined with them. I have encouraged them to ask questions as they arise throughout their visit. Critical Care Time:   None. Disposition:  DISCHARGE NOTE  7:29 PM  The patient has been re-evaluated and is ready for discharge. Reviewed available results with patient. Counseled pt on diagnosis and care plan. Pt has expressed understanding, and all questions have been answered. Pt agrees with plan and agrees to F/U as recommended, or return to the ED if their sxs worsen. Discharge instructions have been provided and explained to the pt, along with reasons to return to the ED. PLAN:  1. Current Discharge Medication List        2. Follow-up Information     Follow up With Details Comments [address] Todd Maher, NP  As needed [address].  [phone]      Cardiologist Schedule an appointment as soon as possible for a visit          Return to ED if worse     Diagnosis     Clinical Impression:   1. Chest discomfort        Attestations:     This note is prepared by Iker Moreno acting as Scribe for ESTELLA Lopez M.D : The juan jose's documentation has been prepared under my direction and personally reviewed by me in its entirety. I confirm that the note above accurately reflects all work, treatment, procedures, and medical decision making performed by me.

## 2018-08-26 NOTE — ED NOTES
Assumed care of pt from triage, pt ambulatory into ED. Pt accompanied by family, pt reports c/o chest discomfort x 1 week with intermittent right arm tingling. Pt states he is not in pain currently, denies SOB.

## 2018-08-26 NOTE — DISCHARGE INSTRUCTIONS
Chest Pain: Care Instructions  Your Care Instructions    There are many things that can cause chest pain. Some are not serious and will get better on their own in a few days. But some kinds of chest pain need more testing and treatment. Your doctor may have recommended a follow-up visit in the next 8 to 12 hours. If you are not getting better, you may need more tests or treatment. Even though your doctor has released you, you still need to watch for any problems. The doctor carefully checked you, but sometimes problems can develop later. If you have new symptoms or if your symptoms do not get better, get medical care right away. If you have worse or different chest pain or pressure that lasts more than 5 minutes or you passed out (lost consciousness), call 911 or seek other emergency help right away. A medical visit is only one step in your treatment. Even if you feel better, you still need to do what your doctor recommends, such as going to all suggested follow-up appointments and taking medicines exactly as directed. This will help you recover and help prevent future problems. How can you care for yourself at home? · Rest until you feel better. · Take your medicine exactly as prescribed. Call your doctor if you think you are having a problem with your medicine. · Do not drive after taking a prescription pain medicine. When should you call for help? Call 911 if:    · You passed out (lost consciousness).     · You have severe difficulty breathing.     · You have symptoms of a heart attack. These may include:  ¨ Chest pain or pressure, or a strange feeling in your chest.  ¨ Sweating. ¨ Shortness of breath. ¨ Nausea or vomiting. ¨ Pain, pressure, or a strange feeling in your back, neck, jaw, or upper belly or in one or both shoulders or arms. ¨ Lightheadedness or sudden weakness. ¨ A fast or irregular heartbeat.   After you call 911, the  may tell you to chew 1 adult-strength or 2 to 4 low-dose aspirin. Wait for an ambulance. Do not try to drive yourself.    Call your doctor today if:    · You have any trouble breathing.     · Your chest pain gets worse.     · You are dizzy or lightheaded, or you feel like you may faint.     · You are not getting better as expected.     · You are having new or different chest pain. Where can you learn more? Go to http://birgit-rashaun.info/. Enter A120 in the search box to learn more about \"Chest Pain: Care Instructions. \"  Current as of: November 20, 2017  Content Version: 11.7  © 8349-7210 [employer]. Care instructions adapted under license by [employer] (which disclaims liability or warranty for this information). If you have questions about a medical condition or this instruction, always ask your healthcare professional. [address] any warranty or liability for your use of this information.

## 2018-08-27 LAB
ATRIAL RATE: 57 BPM
CALCULATED P AXIS, ECG09: 27 DEGREES
CALCULATED R AXIS, ECG10: -46 DEGREES
CALCULATED T AXIS, ECG11: 11 DEGREES
DIAGNOSIS, 93000: NORMAL
P-R INTERVAL, ECG05: 196 MS
Q-T INTERVAL, ECG07: 428 MS
QRS DURATION, ECG06: 104 MS
QTC CALCULATION (BEZET), ECG08: 416 MS
VENTRICULAR RATE, ECG03: 57 BPM

## 2018-08-27 NOTE — ED NOTES
Dr. Jose Eduardo Devi at bedside to provide discharge paperwork. Vital signs stable. Pt in no apparent distress at this time. Mental status at baseline. Ambulatory to waiting room with steady gate, discharge paperwork in hand. Accompanied by wife.

## 2018-09-04 RX ORDER — METOPROLOL TARTRATE 50 MG/1
TABLET ORAL
Qty: 60 TAB | Refills: 0 | Status: SHIPPED | OUTPATIENT
Start: 2018-09-04 | End: 2018-09-30 | Stop reason: SDUPTHER

## 2018-09-10 DIAGNOSIS — I26.92 ACUTE SADDLE PULMONARY EMBOLISM WITHOUT ACUTE COR PULMONALE (HCC): ICD-10-CM

## 2018-09-10 RX ORDER — APIXABAN 5 MG/1
TABLET, FILM COATED ORAL
Qty: 60 TAB | Refills: 0 | Status: SHIPPED | OUTPATIENT
Start: 2018-09-10 | End: 2018-10-07 | Stop reason: SDUPTHER

## 2018-10-01 RX ORDER — METOPROLOL TARTRATE 50 MG/1
TABLET ORAL
Qty: 60 TAB | Refills: 0 | Status: SHIPPED | OUTPATIENT
Start: 2018-10-01 | End: 2018-10-02 | Stop reason: SDUPTHER

## 2018-10-02 ENCOUNTER — OFFICE VISIT (OUTPATIENT)
Dept: CARDIOLOGY CLINIC | Age: 58
End: 2018-10-02

## 2018-10-02 VITALS
HEIGHT: 72 IN | RESPIRATION RATE: 16 BRPM | HEART RATE: 64 BPM | DIASTOLIC BLOOD PRESSURE: 64 MMHG | OXYGEN SATURATION: 98 % | WEIGHT: 267.9 LBS | SYSTOLIC BLOOD PRESSURE: 124 MMHG | BODY MASS INDEX: 36.29 KG/M2

## 2018-10-02 DIAGNOSIS — I48.92 ATRIAL FLUTTER, UNSPECIFIED TYPE (HCC): Primary | ICD-10-CM

## 2018-10-02 DIAGNOSIS — E78.5 HYPERLIPIDEMIA WITH TARGET LDL LESS THAN 100: Chronic | ICD-10-CM

## 2018-10-02 DIAGNOSIS — I26.92 SADDLE EMBOLUS OF PULMONARY ARTERY WITHOUT ACUTE COR PULMONALE, UNSPECIFIED CHRONICITY (HCC): ICD-10-CM

## 2018-10-02 NOTE — PROGRESS NOTES
1. Have you been to the ER, urgent care clinic since your last visit? Hospitalized since your last visit? Yes When: 8/2018 [facility] Chest pain    2. Have you seen or consulted any other health care providers outside of the [facility] since your last visit? Include any pap smears or colon screening.  No     Patient seen in ED due to chest discomfort thinks may have been muscular since trying to be more active this has improved with rest.

## 2018-10-02 NOTE — PROGRESS NOTES
Vidhi Romero DNP, ANP-BC  Subjective/HPI:     Anthony Iqbal is a 60 y.o. male is here for routine f/u. The patient denies chest pain/ shortness of breath, orthopnea, PND, LE edema, palpitations, syncope, presyncope or fatigue. History of atrial fib/flutter has maintained Eliquis uninterrupted. Denies hematuria darkening of stool or excessive bruising or bleeding. Was in the emergency room over the summer for significant fatigue and weakness after increasing his routine exercise. Workup negative. He has since returned back to his baseline. PCP Provider  Kavya Feldman NP  Past Medical History:   Diagnosis Date    Atrial flutter ([address] 1/18/2018    Atrial flutter ([address] 1/18/2018    Cerumen impaction 10/14/2013    Controlled type 2 diabetes mellitus without complication, without long-term current use of insulin ([address]     Controlled type 2 diabetes mellitus without complication, without long-term current use of insulin ([address] 1/29/2018    Elevated blood pressure 10/14/2013    Hyperlipidemia with target LDL less than 100 11/26/2013    Obesity, unspecified 10/10/2013    Pulmonary emboli ([address] 01/19/2018    [id number] -    Pulmonary embolism ([facility] 1/16/2018    Vitamin D deficiency 1/29/2018      No past surgical history on file. Allergies   Allergen Reactions    Ibuprofen Other (comments)     Increase B/P      Family History   Problem Relation Age of Onset    Hypertension Mother     Hypertension Father       Current Outpatient Prescriptions   Medication Sig    multivitamin (MULTIPLE VITAMIN PO) Take  by mouth as needed.  ELIQUIS 5 mg tablet TAKE 1 TABLET BY MOUTH TWICE DAILY    omega 3-dha-epa-fish oil (FISH OIL) 100-160-1,000 mg cap Take  by mouth.  cholecalciferol (VITAMIN D3) 1,000 unit cap Take  by mouth daily.  metoprolol tartrate (LOPRESSOR) 50 mg tablet TAKE 1 TABLET BY MOUTH EVERY 12 HOURS    cyanocobalamin 1,000 mcg tablet Take 1,000 mcg by mouth daily.      No current facility-administered medications for this visit. Vitals:    10/02/18 0930 10/02/18 0945   BP: 140/80 124/64   Pulse: 64    Resp: 16    SpO2: 98%    Weight: 267 lb 14.4 oz (121.5 kg)    Height: 6' (1.829 m)      Social History     Social History    Marital status:      Spouse name: N/A    Number of children: N/A    Years of education: N/A     Occupational History    Not on file. Social History Main Topics    Smoking status: Never Smoker    Smokeless tobacco: Never Used    Alcohol use No    Drug use: No    Sexual activity: Yes     Partners: Female     Other Topics Concern    Not on file     Social History Narrative     to Cora       I have reviewed the nurses notes, vitals, problem list, allergy list, medical history, family, social history and medications. Review of Symptoms:    General: Pt denies excessive weight gain or loss. Pt is able to conduct ADL's  HEENT: Denies blurred vision, headaches, epistaxis and difficulty swallowing. Respiratory: Denies shortness of breath, ACOSTA, wheezing or stridor. Cardiovascular: Denies precordial pain, palpitations, edema or PND  Gastrointestinal: Denies poor appetite, indigestion, abdominal pain or blood in stool  Musculoskeletal: Denies pain or swelling from muscles or joints  Neurologic: Denies tremor, paresthesias, or sensory motor disturbance  Skin: Denies rash, itching or texture change. Physical Exam:      General: Well developed, in no acute distress, cooperative and alert  HEENT: No carotid bruits, no JVD, trach is midline. Neck Supple, PEERL, EOM intact. Heart:  Normal S1/S2 negative S3 or S4. Regular, no murmur, gallop or rub.   Respiratory: Clear bilaterally x 4, no wheezing or rales  Abdomen:   Soft, non-tender, no masses, bowel sounds are active.   Extremities:  No edema, normal cap refill, no cyanosis, atraumatic. Neuro: A&Ox3, speech clear, gait stable. Skin: Skin color is normal. No rashes or lesions.  Non diaphoretic  Vascular: 2+ pulses symmetric in all extremities    Cardiographics    ECG: Sinus rhythm  Results for orders placed or performed during the hospital encounter of 08/26/18   EKG, 12 LEAD, INITIAL   Result Value Ref Range    Ventricular Rate 57 BPM    Atrial Rate 57 BPM    P-R Interval 196 ms    QRS Duration 104 ms    Q-T Interval 428 ms    QTC Calculation (Bezet) 416 ms    Calculated P Axis 27 degrees    Calculated R Axis -46 degrees    Calculated T Axis 11 degrees    Diagnosis       Sinus bradycardia  Possible Left atrial enlargement  Incomplete right bundle branch block  Left anterior fascicular block  Nonspecific T wave abnormality  When compared with ECG of 23-FEB-2018 09:41,  Incomplete right bundle branch block is now present  Confirmed by Renata Blair ([id number]) on 8/27/2018 10:18:31 AM           Cardiology Labs:  Lab Results   Component Value Date/Time    Cholesterol, total 200 (H) 01/18/2018 04:40 AM    HDL Cholesterol 54 01/18/2018 04:40 AM    LDL, calculated 125.4 (H) 01/18/2018 04:40 AM    Triglyceride 103 01/18/2018 04:40 AM    CHOL/HDL Ratio 3.7 01/18/2018 04:40 AM       Lab Results   Component Value Date/Time    Sodium 139 08/26/2018 05:40 PM    Potassium 3.9 08/26/2018 05:40 PM    Chloride 105 08/26/2018 05:40 PM    CO2 27 08/26/2018 05:40 PM    Anion gap 7 08/26/2018 05:40 PM    Glucose 97 08/26/2018 05:40 PM    BUN 14 08/26/2018 05:40 PM    Creatinine 1.23 08/26/2018 05:40 PM    BUN/Creatinine ratio 11 (L) 08/26/2018 05:40 PM    GFR est AA >60 08/26/2018 05:40 PM    GFR est non-AA >60 08/26/2018 05:40 PM    Calcium 9.1 08/26/2018 05:40 PM    Bilirubin, total 0.4 08/26/2018 05:40 PM    AST (SGOT) 16 08/26/2018 05:40 PM    Alk.  phosphatase 90 08/26/2018 05:40 PM    Protein, total 7.7 08/26/2018 05:40 PM    Albumin 3.7 08/26/2018 05:40 PM    Globulin 4.0 08/26/2018 05:40 PM    A-G Ratio 0.9 (L) 08/26/2018 05:40 PM    ALT (SGPT) 24 08/26/2018 05:40 PM           Assessment:     Assessment: Diagnoses and all orders for this visit:    1. Atrial flutter, unspecified type ([address]    2. Hyperlipidemia with target LDL less than 100  -     AMB POC EKG ROUTINE W/ 12 LEADS, INTER & REP    3. Saddle embolus of pulmonary artery without acute cor pulmonale, unspecified chronicity ([address]        ICD-10-CM ICD-9-CM    1. Atrial flutter, unspecified type ([address] I48.92 427.32    2. Hyperlipidemia with target LDL less than 100 E78.5 272.4 AMB POC EKG ROUTINE W/ 12 LEADS, INTER & REP   3. Saddle embolus of pulmonary artery without acute cor pulmonale, unspecified chronicity ([facility]) I26.92 415.13      Orders Placed This Encounter    AMB POC EKG ROUTINE W/ 12 LEADS, INTER & REP     Order Specific Question:   Reason for Exam:     Answer:   routine    multivitamin (MULTIPLE VITAMIN PO)     Sig: Take  by mouth as needed. Plan:     1. Paroxysmal atrial fib/flutter: Maintaining sinus rhythm. Continue Eliquis long-term  2. 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 As well treated with Eliquis  3. Hypertension: Repeat 124/64 discussed with patient to work on diet exercise weight loss. Follow-up with cardiology in 6 months    Sara Polk NP    This note was created using voice recognition software. Despite editing, there may be syntax errors. Pt seen and examined in details. Agree with NP A&P. D/w pt. He wants to stop DOAC. Has an appt with heme/onc and will decide afterwards. Advised to call back. F/u in 6 months.      Skylar Urias MD

## 2018-10-02 NOTE — MR AVS SNAPSHOT
[facility] [address] 
[phone] Patient: Shaq Ghosh MRN: [id number] WJD:[id number] Visit Information Date & Time Provider Department Dept. Phone Encounter #  
 10/2/2018  9:15 AM Jeronimo Goss, [address] [facility] [phone] [id number] Your Appointments 11/6/2018  8:30 AM  
Any with Eduardo Mccarthy NP [facility]) Appt Note: 6m follow up  
 [address] [place] [address] [id number]  
[phone] [id number] [phone]  
  
    
 11/9/2018 10:00 AM  
ESTABLISHED PATIENT with Neo Stanley MD  
2100 [facility]) Appt Note: heme f/u  
 200 [facility] [address] [id number]  
327 [facility] [address] Upcoming Health Maintenance Date Due Hepatitis C Screening 1960 FOOT EXAM Q1 8/23/1969 MICROALBUMIN Q1 8/23/1969 EYE EXAM RETINAL OR DILATED Q1 8/23/1969 COLONOSCOPY 8/23/1977 Pneumococcal 19-64 Medium Risk (1 of 1 - PPSV23) 8/23/1978 DTaP/Tdap/Td series (1 - Tdap) 8/23/1980 Shingrix Vaccine Age 50> (1 of 2) 8/23/2009 Influenza Age 5 to Adult 8/1/2018 HEMOGLOBIN A1C Q6M 11/1/2018 LIPID PANEL Q1 1/18/2019 Allergies as of 10/2/2018  Review Complete On: 10/2/2018 By: Jeronimo Goss MD  
  
 Severity Noted Reaction Type Reactions Ibuprofen  10/10/2013   Side Effect Other (comments) Increase B/P Current Immunizations  Never Reviewed No immunizations on file. Not reviewed this visit You Were Diagnosed With   
  
 Codes Comments Atrial flutter, unspecified type ([facility]    -  Primary ICD-10-CM: I48.92 
ICD-9-CM: 427.32 Hyperlipidemia with target LDL less than 100     ICD-10-CM: E78.5 ICD-9-CM: 272.4 Saddle embolus of pulmonary artery without acute cor pulmonale, unspecified chronicity (HCC)     ICD-10-CM: I26.92 
ICD-9-CM: 415.13 Vitals BP Pulse Resp Height(growth percentile) Weight(growth percentile) SpO2  
 124/64 (BP 1 Location: Right arm) 64 16 6' (1.829 m) 267 lb 14.4 oz (121.5 kg) 98% BMI Smoking Status 36.33 kg/m2 Never Smoker Vitals History BMI and BSA Data Body Mass Index Body Surface Area  
 36.33 kg/m 2 2.48 m 2 Preferred Pharmacy Pharmacy Name Phone Maryam Justin [facility] Brooke Ceron  [place] [phone] Your Updated Medication List  
  
   
This list is accurate as of 10/2/18  9:55 AM.  Always use your most recent med list.  
  
  
  
  
 cyanocobalamin 1,000 mcg tablet Take 1,000 mcg by mouth daily. ELIQUIS 5 mg tablet Generic drug:  apixaban TAKE 1 TABLET BY MOUTH TWICE DAILY FISH -160-1,000 mg Cap Generic drug:  omega 3-dha-epa-fish oil Take  by mouth.  
  
 metoprolol tartrate 50 mg tablet Commonly known as:  LOPRESSOR  
TAKE 1 TABLET BY MOUTH EVERY 12 HOURS MULTIPLE VITAMIN PO Take  by mouth as needed. VITAMIN D3 1,000 unit Cap Generic drug:  cholecalciferol Take  by mouth daily. We Performed the Following AMB POC EKG ROUTINE W/ 12 LEADS, INTER & REP [38819 CPT(R)] Introducing [facility]! [facility] introduces [employer] patient portal. Now you can access parts of your medical record, email your doctor's office, and request medication refills online. 1. In your internet browser, go to [email or web address] 2. Click on the First Time User? Click Here link in the Sign In box. You will see the New Member Sign Up page. 3. Enter your [employer] Access Code exactly as it appears below.  You will not need to use this code after youve completed the sign-up process. If you do not sign up before the expiration date, you must request a new code. · [employer] Access Code: [email or web address] Expires: 11/24/2018  5:02 PM 
 
4. Enter the last four digits of your Social Security Number (xxxx) and Date of Birth (mm/dd/yyyy) as indicated and click Submit. You will be taken to the next sign-up page. 5. Create a [employer] ID. This will be your [employer] login ID and cannot be changed, so think of one that is secure and easy to remember. 6. Create a [employer] password. You can change your password at any time. 7. Enter your Password Reset Question and Answer. This can be used at a later time if you forget your password. 8. Enter your e-mail address. You will receive e-mail notification when new information is available in [address] 9. Click Sign Up. You can now view and download portions of your medical record. 10. Click the Download Summary menu link to download a portable copy of your medical information. If you have questions, please visit the Frequently Asked Questions section of the [employer] website. Remember, [employer] is NOT to be used for urgent needs. For medical emergencies, dial 911. Now available from your iPhone and Android! Please provide this summary of care documentation to your next provider. Your primary care clinician is listed as [facility]. If you have any questions after today's visit, please call [phone].

## 2018-10-07 DIAGNOSIS — I26.92 ACUTE SADDLE PULMONARY EMBOLISM WITHOUT ACUTE COR PULMONALE (HCC): ICD-10-CM

## 2018-10-08 RX ORDER — APIXABAN 5 MG/1
TABLET, FILM COATED ORAL
Qty: 60 TAB | Refills: 0 | Status: SHIPPED | OUTPATIENT
Start: 2018-10-08 | End: 2018-11-05 | Stop reason: SDUPTHER

## 2018-10-31 RX ORDER — METOPROLOL TARTRATE 50 MG/1
TABLET ORAL
Qty: 60 TAB | Refills: 0 | Status: SHIPPED | OUTPATIENT
Start: 2018-10-31 | End: 2018-11-28 | Stop reason: SDUPTHER

## 2018-11-05 DIAGNOSIS — I26.92 ACUTE SADDLE PULMONARY EMBOLISM WITHOUT ACUTE COR PULMONALE (HCC): ICD-10-CM

## 2018-11-06 ENCOUNTER — OFFICE VISIT (OUTPATIENT)
Dept: FAMILY MEDICINE CLINIC | Age: 58
End: 2018-11-06

## 2018-11-06 VITALS
SYSTOLIC BLOOD PRESSURE: 139 MMHG | BODY MASS INDEX: 36.24 KG/M2 | TEMPERATURE: 98.8 F | WEIGHT: 267.6 LBS | OXYGEN SATURATION: 95 % | DIASTOLIC BLOOD PRESSURE: 86 MMHG | HEART RATE: 59 BPM | RESPIRATION RATE: 18 BRPM | HEIGHT: 72 IN

## 2018-11-06 DIAGNOSIS — Z11.59 NEED FOR HEPATITIS C SCREENING TEST: ICD-10-CM

## 2018-11-06 DIAGNOSIS — Z98.890 HISTORY OF CARDIOVERSION: ICD-10-CM

## 2018-11-06 DIAGNOSIS — E11.9 CONTROLLED TYPE 2 DIABETES MELLITUS WITHOUT COMPLICATION, WITHOUT LONG-TERM CURRENT USE OF INSULIN (HCC): Primary | ICD-10-CM

## 2018-11-06 DIAGNOSIS — E66.01 SEVERE OBESITY (BMI 35.0-39.9) WITH COMORBIDITY (HCC): ICD-10-CM

## 2018-11-06 DIAGNOSIS — I48.92 ATRIAL FLUTTER, UNSPECIFIED TYPE (HCC): ICD-10-CM

## 2018-11-06 DIAGNOSIS — Z86.711 HISTORY OF PULMONARY EMBOLISM: ICD-10-CM

## 2018-11-06 LAB — HBA1C MFR BLD HPLC: 5.9 %

## 2018-11-06 RX ORDER — APIXABAN 5 MG/1
TABLET, FILM COATED ORAL
Qty: 60 TAB | Refills: 0 | Status: SHIPPED | OUTPATIENT
Start: 2018-11-06 | End: 2018-12-03 | Stop reason: SDUPTHER

## 2018-11-06 NOTE — PROGRESS NOTES
Chief Complaint Patient presents with  Diabetes Follow Up 1. Have you been to the ER, urgent care clinic since your last visit? Hospitalized since your last visit? No 
 
2. Have you seen or consulted any other health care providers outside of the [address] [place] [address] since your last visit? Include any pap smears or colon screening. No  
 
Eye Exam - Few years ago and is unaware of the doctor. Colonoscopy - Pt is aware overdue. Pt declined flu shot. Health Maintenance Due Topic Date Due  
 Hepatitis C Screening  1960  
 FOOT EXAM Q1  08/23/1969  MICROALBUMIN Q1  08/23/1969  
 EYE EXAM RETINAL OR DILATED Q1  08/23/1969  COLONOSCOPY  08/23/1977  Pneumococcal 19-64 Medium Risk (1 of 1 - PPSV23) 08/23/1978  DTaP/Tdap/Td series (1 - Tdap) 08/23/1980  Shingrix Vaccine Age 50> (1 of 2) 08/23/2009  Influenza Age 5 to Adult  08/01/2018  HEMOGLOBIN A1C Q6M  11/01/2018

## 2018-11-07 LAB
ALBUMIN/CREAT UR: <1.8 MG/G CREAT (ref 0–30)
BUN SERPL-MCNC: 12 MG/DL (ref 6–24)
BUN/CREAT SERPL: 10 (ref 9–20)
CALCIUM SERPL-MCNC: 10.1 MG/DL (ref 8.7–10.2)
CHLORIDE SERPL-SCNC: 100 MMOL/L (ref 96–106)
CHOLEST SERPL-MCNC: 219 MG/DL (ref 100–199)
CO2 SERPL-SCNC: 26 MMOL/L (ref 20–29)
CREAT SERPL-MCNC: 1.18 MG/DL (ref 0.76–1.27)
CREAT UR-MCNC: 165.5 MG/DL
GLUCOSE SERPL-MCNC: 136 MG/DL (ref 65–99)
HCV AB S/CO SERPL IA: 0.1 S/CO RATIO (ref 0–0.9)
HDLC SERPL-MCNC: 61 MG/DL
INTERPRETATION, 910389: NORMAL
LDLC SERPL CALC-MCNC: 138 MG/DL (ref 0–99)
Lab: NORMAL
Lab: NORMAL
MICROALBUMIN UR-MCNC: <3 UG/ML
POTASSIUM SERPL-SCNC: 5.3 MMOL/L (ref 3.5–5.2)
SODIUM SERPL-SCNC: 142 MMOL/L (ref 134–144)
TRIGL SERPL-MCNC: 99 MG/DL (ref 0–149)
VLDLC SERPL CALC-MCNC: 20 MG/DL (ref 5–40)

## 2018-11-09 ENCOUNTER — OFFICE VISIT (OUTPATIENT)
Dept: ONCOLOGY | Age: 58
End: 2018-11-09

## 2018-11-09 VITALS
HEIGHT: 72 IN | SYSTOLIC BLOOD PRESSURE: 153 MMHG | OXYGEN SATURATION: 98 % | HEART RATE: 54 BPM | TEMPERATURE: 97.8 F | RESPIRATION RATE: 16 BRPM | BODY MASS INDEX: 36.35 KG/M2 | DIASTOLIC BLOOD PRESSURE: 91 MMHG | WEIGHT: 268.4 LBS

## 2018-11-09 DIAGNOSIS — Z86.711 HISTORY OF PULMONARY EMBOLISM: Primary | ICD-10-CM

## 2018-11-09 NOTE — PROGRESS NOTES
Nupur Gomez is a 61 y.o. male here today for PE; unprovoked saddle f/u. Patient on Eliquis. Elevated B/P and low pulse noted; pt confirmed taking B/P medication today; other VS stable. Patient denies SOB, pain and coughing.     Visit Vitals  BP (!) 153/91 (BP 1 Location: Left arm, BP Patient Position: Sitting)   Pulse (!) 54   Temp 97.8 °F (36.6 °C) (Oral)   Resp 16   Ht 6' (1.829 m)   Wt 268 lb 6.4 oz (121.7 kg)   SpO2 98%   BMI 36.40 kg/m²

## 2018-11-09 NOTE — PROGRESS NOTES
2001 [facility]  [address], [address] [place] [address] Navid [place], 200 [facility]  [phone]         Hematology Follow Up        Patient: Scar Shah MRN: [id number]  SSN: [id number]    YOB: 1960  Age: 60 y.o. Sex: male      Subjective:      Scar Shah is a 60 y.o. male who with a diagnosis of PE. 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 He has been taking Apixaban. He is able to exercise better but not back to his self. No bleeding. He has also been diagnosed with A. Fib/A. flutter and is s/p MIQUEL/DCCV and remains in sinus rhythm. Review of Systems:    Constitutional: negative  Eyes: negative  Ears, Nose, Mouth, Throat, and Face: negative  Respiratory: dyspnea  Cardiovascular: negative  Gastrointestinal: negative  Genitourinary:negative  Integument/Breast: negative  Hematologic/Lymphatic: negative  Musculoskeletal:negative  Neurological: negative          Past Medical History:   Diagnosis Date    Atrial flutter ([address] 1/18/2018    Atrial flutter ([address] 1/18/2018    Cerumen impaction 10/14/2013    Controlled type 2 diabetes mellitus without complication, without long-term current use of insulin ([address]     Controlled type 2 diabetes mellitus without complication, without long-term current use of insulin ([address] 1/29/2018    Elevated blood pressure 10/14/2013    Hyperlipidemia with target LDL less than 100 11/26/2013    Obesity, unspecified 10/10/2013    Pulmonary emboli ([address] 01/19/2018    [address] -    Pulmonary embolism ([address] 1/16/2018    Vitamin D deficiency 1/29/2018     No past surgical history on file.    Family History   Problem Relation Age of Onset    Hypertension Mother     Hypertension Father      Social History     Tobacco Use    Smoking status: Never Smoker    Smokeless tobacco: Never Used   Substance Use Topics    Alcohol use: No      Prior to Admission medications Medication Sig Start Date End Date Taking? Authorizing Provider   ELIQUIS 5 mg tablet TAKE 1 TABLET BY MOUTH TWICE DAILY 11/6/18  Yes Brent Brink MD   metoprolol tartrate (LOPRESSOR) 50 mg tablet TAKE 1 TABLET BY MOUTH EVERY 12 HOURS 10/31/18  Yes Ishmael Parker MD   omega 3-dha-epa-fish oil (FISH OIL) 100-160-1,000 mg cap Take  by mouth. Yes Provider, Historical   cholecalciferol (VITAMIN D3) 1,000 unit cap Take  by mouth daily. Yes Provider, Historical   metoprolol tartrate (LOPRESSOR) 50 mg tablet TAKE 1 TABLET BY MOUTH EVERY 12 HOURS 2/16/18  Yes Tutu Farris NP   multivitamin (MULTIPLE VITAMIN PO) Take  by mouth as needed. Provider, Historical   cyanocobalamin 1,000 mcg tablet Take 1,000 mcg by mouth daily. Provider, Historical              Allergies   Allergen Reactions    Ibuprofen Other (comments)     Increase B/P           Objective:     Vitals:    11/09/18 1012   BP: (!) 153/91   Pulse: (!) 54   Resp: 16   Temp: 97.8 °F (36.6 °C)   TempSrc: Oral   SpO2: 98%   Weight: 268 lb 6.4 oz (121.7 kg)   Height: 6' (1.829 m)            Physical Exam:    GENERAL: alert, cooperative, no distress, appears stated age  EYE: negative  LYMPHATIC: Cervical, supraclavicular, and axillary nodes normal.   THROAT & NECK: normal and no erythema or exudates noted. LUNG: clear to auscultation bilaterally  HEART: regular rate and rhythm  ABDOMEN: soft, non-tender  EXTREMITIES:  no edema  SKIN: Normal.  NEUROLOGIC: negative            Assessment:     1. Pulmonary embolism:    > 1st episode/event of  PE in 01/2018 - unprovoked    On Apixaban  Dyspnea on exertion is improving. He has completed his 6 months of systemic anticoagulation. He has A Fib/Flutter. He will continue Apixaban for an additional 6 months. Eventually he wants to come off the medicine. Hopefully we will discontinue Apixaban in 6 months and switch to an ASA daily.  ASA has been shown to reduce the risk of recurrence of PE/DVT by 40%.         Plan:       > Continue systemic anticoagulation with Apixaban for at least 6 more months  > Return in 6 months      Signed by: Kerri Zendejas MD                     November 9, 2018        CC.  Nathan Brambila NP

## 2018-11-26 DIAGNOSIS — E78.5 HYPERLIPIDEMIA WITH TARGET LDL LESS THAN 100: Primary | Chronic | ICD-10-CM

## 2018-11-26 RX ORDER — ATORVASTATIN CALCIUM 10 MG/1
10 TABLET, FILM COATED ORAL DAILY
Qty: 90 TAB | Refills: 1 | Status: SHIPPED | OUTPATIENT
Start: 2018-11-26 | End: 2019-05-07 | Stop reason: ALTCHOICE

## 2018-11-27 ENCOUNTER — TELEPHONE (OUTPATIENT)
Dept: FAMILY MEDICINE CLINIC | Age: 58
End: 2018-11-27

## 2018-11-27 NOTE — PROGRESS NOTES
RECOMMENDATIONS: 
Diabetes numbers look great!! Keep up the good work! Cholesterol numbers are elevated. According to the American Heart Association and the [employer] of Cardiology, your 10-year risk of a heart attack or stroke is 25.8%. Adults 36to 76years of age with LDL 70 to 189 mg/dL with no diabetes and estimated 10-year risk ?7.5% or a stroke or heart attack should be treated with cholesterol medication. I have sent a prescription for a cholesterol medication called atorvastatin and would like for you to take at bedtime every day to help reduce your risk. I have enclosed information about diet and lifestyle to help lower cholesterol - try to limit the amount of fried foods, fatty foods, butter, gravy, red meat, ice cream, cheese, and eggs in your diet, which are all high in cholesterol. Plan to recheck in 3-6 months. Make sure you are fasting (no food, only water) for 8-12 hours before your appointment. Hepatitis C screening negative.   Kidney function normal.

## 2018-11-27 NOTE — TELEPHONE ENCOUNTER
----- Message from Deanne Moreno NP sent at 11/26/2018  7:47 PM EST -----  RECOMMENDATIONS:  Diabetes numbers look great!! Keep up the good work! Cholesterol numbers are elevated. According to the American Heart Association and the [employer] of Cardiology, your 10-year risk of a heart attack or stroke is 25.8%. Adults 36to 76years of age with LDL 70 to 189 mg/dL with no diabetes and estimated 10-year risk ?7.5% or a stroke or heart attack should be treated with cholesterol medication. I have sent a prescription for a cholesterol medication called atorvastatin and would like for you to take at bedtime every day to help reduce your risk. I have enclosed information about diet and lifestyle to help lower cholesterol - try to limit the amount of fried foods, fatty foods, butter, gravy, red meat, ice cream, cheese, and eggs in your diet, which are all high in cholesterol. Plan to recheck in 3-6 months. Make sure you are fasting (no food, only water) for 8-12 hours before your appointment. Hepatitis C screening negative.   Kidney function normal.

## 2018-11-27 NOTE — TELEPHONE ENCOUNTER
----- Message from Cristal Carrel, NP sent at 11/26/2018  7:47 PM EST -----  RECOMMENDATIONS:  Diabetes numbers look great!! Keep up the good work! Cholesterol numbers are elevated. According to the American Heart Association and the [employer] of Cardiology, your 10-year risk of a heart attack or stroke is 25.8%. Adults 36to 76years of age with LDL 70 to 189 mg/dL with no diabetes and estimated 10-year risk ?7.5% or a stroke or heart attack should be treated with cholesterol medication. I have sent a prescription for a cholesterol medication called atorvastatin and would like for you to take at bedtime every day to help reduce your risk. I have enclosed information about diet and lifestyle to help lower cholesterol - try to limit the amount of fried foods, fatty foods, butter, gravy, red meat, ice cream, cheese, and eggs in your diet, which are all high in cholesterol. Plan to recheck in 3-6 months. Make sure you are fasting (no food, only water) for 8-12 hours before your appointment. Hepatitis C screening negative.   Kidney function normal.

## 2018-11-27 NOTE — TELEPHONE ENCOUNTER
Spoke with pt and informed of results. Pt asked to try to change diet before taking medications. Informed pt of risks and per Aristeo Ram NP pt to come in for lab only in 3 months to check cholesterol numbers. Pt verbalized understanding.

## 2018-11-29 RX ORDER — METOPROLOL TARTRATE 50 MG/1
TABLET ORAL
Qty: 60 TAB | Refills: 0 | Status: SHIPPED | OUTPATIENT
Start: 2018-11-29 | End: 2018-12-26 | Stop reason: SDUPTHER

## 2018-12-03 DIAGNOSIS — I26.92 ACUTE SADDLE PULMONARY EMBOLISM WITHOUT ACUTE COR PULMONALE (HCC): ICD-10-CM

## 2018-12-05 RX ORDER — APIXABAN 5 MG/1
TABLET, FILM COATED ORAL
Qty: 60 TAB | Refills: 0 | Status: SHIPPED | OUTPATIENT
Start: 2018-12-05 | End: 2019-01-02 | Stop reason: SDUPTHER

## 2018-12-26 RX ORDER — METOPROLOL TARTRATE 50 MG/1
TABLET ORAL
Qty: 60 TAB | Refills: 0 | Status: SHIPPED | OUTPATIENT
Start: 2018-12-26 | End: 2019-01-22 | Stop reason: SDUPTHER

## 2019-01-02 DIAGNOSIS — I26.92 ACUTE SADDLE PULMONARY EMBOLISM WITHOUT ACUTE COR PULMONALE (HCC): ICD-10-CM

## 2019-01-02 RX ORDER — APIXABAN 5 MG/1
TABLET, FILM COATED ORAL
Qty: 60 TAB | Refills: 0 | Status: SHIPPED | OUTPATIENT
Start: 2019-01-02 | End: 2019-01-29 | Stop reason: SDUPTHER

## 2019-01-22 RX ORDER — METOPROLOL TARTRATE 50 MG/1
TABLET ORAL
Qty: 60 TAB | Refills: 0 | Status: SHIPPED | OUTPATIENT
Start: 2019-01-22 | End: 2019-03-16 | Stop reason: SDUPTHER

## 2019-01-29 DIAGNOSIS — I26.92 ACUTE SADDLE PULMONARY EMBOLISM WITHOUT ACUTE COR PULMONALE (HCC): ICD-10-CM

## 2019-01-31 RX ORDER — APIXABAN 5 MG/1
TABLET, FILM COATED ORAL
Qty: 60 TAB | Refills: 0 | Status: SHIPPED | OUTPATIENT
Start: 2019-01-31 | End: 2019-03-16 | Stop reason: SDUPTHER

## 2019-03-16 DIAGNOSIS — I26.92 ACUTE SADDLE PULMONARY EMBOLISM WITHOUT ACUTE COR PULMONALE (HCC): ICD-10-CM

## 2019-03-17 RX ORDER — APIXABAN 5 MG/1
TABLET, FILM COATED ORAL
Qty: 60 TAB | Refills: 0 | Status: SHIPPED | OUTPATIENT
Start: 2019-03-17 | End: 2019-04-16 | Stop reason: SDUPTHER

## 2019-03-18 RX ORDER — METOPROLOL TARTRATE 50 MG/1
TABLET ORAL
Qty: 60 TAB | Refills: 0 | Status: SHIPPED | OUTPATIENT
Start: 2019-03-18 | End: 2019-04-09 | Stop reason: SDUPTHER

## 2019-04-09 ENCOUNTER — OFFICE VISIT (OUTPATIENT)
Dept: CARDIOLOGY CLINIC | Age: 59
End: 2019-04-09

## 2019-04-09 VITALS
WEIGHT: 271.8 LBS | SYSTOLIC BLOOD PRESSURE: 122 MMHG | OXYGEN SATURATION: 98 % | BODY MASS INDEX: 36.82 KG/M2 | DIASTOLIC BLOOD PRESSURE: 80 MMHG | HEART RATE: 61 BPM | HEIGHT: 72 IN | RESPIRATION RATE: 16 BRPM

## 2019-04-09 DIAGNOSIS — I48.92 ATRIAL FLUTTER, UNSPECIFIED TYPE (HCC): Primary | ICD-10-CM

## 2019-04-09 DIAGNOSIS — I26.92 SADDLE EMBOLUS OF PULMONARY ARTERY WITHOUT ACUTE COR PULMONALE, UNSPECIFIED CHRONICITY (HCC): ICD-10-CM

## 2019-04-09 DIAGNOSIS — I10 ESSENTIAL HYPERTENSION: Chronic | ICD-10-CM

## 2019-04-09 DIAGNOSIS — E78.2 MIXED HYPERLIPIDEMIA: ICD-10-CM

## 2019-04-09 NOTE — PROGRESS NOTES
Bianca Nash, [facility] Subjective/HPI:  
 
Mr. Ken Weller is a 60 y.o. male is here for routine f/u. He has a PMHx of PAF, saddle PE (unprovoked), HTN, HLD, DM2 and obesity. Since last visit, he was prescribed lipitor for increased cholesterol. He did not start this medication. He had discussed with his PCP to work on diet and exercise first.  He has changed his diet and has been walking more. He wants to engage in more vigorous exercise, but is uncertain is this is safe from cardiac standpoint. He hopes to come off his Eliquis around May this year, per discussion with heme/onc. He otherwise denies complaints of chest pains, dizziness, orthopnea, PND or edema. He denies palpitations or shortness of breath with exertion. PCP Provider Dani Mattson NP Past Medical History:  
Diagnosis Date  Atrial flutter ([address] 1/18/2018  Atrial flutter ([address] 1/18/2018  Cerumen impaction 10/14/2013  Controlled type 2 diabetes mellitus without complication, without long-term current use of insulin ([address]  Controlled type 2 diabetes mellitus without complication, without long-term current use of insulin ([address] 1/29/2018  Elevated blood pressure 10/14/2013  Hyperlipidemia with target LDL less than 100 11/26/2013  Obesity, unspecified 10/10/2013  Pulmonary emboli ([address] 01/19/2018 [facility] -  
 Pulmonary embolism ([address] 1/16/2018  Vitamin D deficiency 1/29/2018 History reviewed. No pertinent surgical history. Family History Problem Relation Age of Onset  Hypertension Mother  Hypertension Father Social History Socioeconomic History  Marital status:  Spouse name: Not on file  Number of children: Not on file  Years of education: Not on file  Highest education level: Not on file Occupational History  Not on file Social Needs  Financial resource strain: Not on file  Food insecurity:  
  Worry: Not on file Inability: Not on file  Transportation needs:  
  Medical: Not on file Non-medical: Not on file Tobacco Use  Smoking status: Never Smoker  Smokeless tobacco: Never Used Substance and Sexual Activity  Alcohol use: No  
 Drug use: No  
 Sexual activity: Yes  
  Partners: Female Lifestyle  Physical activity:  
  Days per week: Not on file Minutes per session: Not on file  Stress: Not on file Relationships  Social connections:  
  Talks on phone: Not on file Gets together: Not on file Attends Scientologist service: Not on file Active member of club or organization: Not on file Attends meetings of clubs or organizations: Not on file Relationship status: Not on file  Intimate partner violence:  
  Fear of current or ex partner: Not on file Emotionally abused: Not on file Physically abused: Not on file Forced sexual activity: Not on file Other Topics Concern [address] Service Not Asked  Blood Transfusions Not Asked  Caffeine Concern Not Asked  Occupational Exposure Not Asked Trudi Esvin Hazards Not Asked  Sleep Concern Not Asked  Stress Concern Not Asked  Weight Concern Not Asked  Special Diet Not Asked  Back Care Not Asked  Exercise Not Asked  Bike Helmet Not Asked  Seat Belt Not Asked  Self-Exams Not Asked Social History Narrative  to [employer] Allergies Allergen Reactions  Ibuprofen Other (comments) Increase B/P Current Outpatient Medications Medication Sig  ELIQUIS 5 mg tablet TAKE 1 TABLET BY MOUTH TWICE DAILY  cholecalciferol (VITAMIN D3) 1,000 unit cap Take  by mouth daily.  metoprolol tartrate (LOPRESSOR) 50 mg tablet TAKE 1 TABLET BY MOUTH EVERY 12 HOURS  atorvastatin (LIPITOR) 10 mg tablet Take 1 Tab by mouth daily. No current facility-administered medications for this visit. I have reviewed the problem list, allergy list, medical history, family, social history and medications. Review of Symptoms: 
 
Review of Systems Constitutional: Negative for chills, fever and weight loss. HENT: Negative for nosebleeds. Eyes: Negative for blurred vision and double vision. Respiratory: Negative for cough, shortness of breath and wheezing. Cardiovascular: Negative for chest pain, palpitations, orthopnea, leg swelling and PND. Gastrointestinal: Negative for abdominal pain, blood in stool, diarrhea, nausea and vomiting. Musculoskeletal: Negative for joint pain. Skin: Negative for rash. Neurological: Negative for dizziness, tingling and loss of consciousness. Endo/Heme/Allergies: Does not bruise/bleed easily. Physical Exam:   
 
General: Well developed, in no acute distress, cooperative and alert HEENT: No carotid bruits, no JVD, trach is midline. Neck Supple, PEERL, EOM intact. Heart:  reg rate and rhythm; normal S1/S2; no murmurs, gallops or rubs. Respiratory: Clear bilaterally x 4, no wheezing or rales Abdomen:   Soft, non-tender, no distention, no masses. + BS. Extremities:  Normal cap refill, no cyanosis, atraumatic. No edema. Neuro: A&Ox3, speech clear, gait stable. Skin: Skin color is normal. No rashes or lesions. Non diaphoretic Vascular: 2+ pulses symmetric in all extremities Vitals:  
 04/09/19 9074 04/09/19 3634 BP: 124/80 122/80 Pulse: 61 Resp: 16 SpO2: 98% Weight: 271 lb 12.8 oz (123.3 kg) Height: 6' (1.829 m) Cardiographics ECG: sinus rhythm Results for orders placed or performed during the hospital encounter of 08/26/18 EKG, 12 LEAD, INITIAL Result Value Ref Range Ventricular Rate 57 BPM  
 Atrial Rate 57 BPM  
 P-R Interval 196 ms QRS Duration 104 ms Q-T Interval 428 ms QTC Calculation (Bezet) 416 ms Calculated P Axis 27 degrees Calculated R Axis -46 degrees Calculated T Axis 11 degrees Diagnosis Sinus bradycardia Possible Left atrial enlargement Incomplete right bundle branch block Left anterior fascicular block Nonspecific T wave abnormality When compared with ECG of 23-FEB-2018 09:41, Incomplete right bundle branch block is now present Confirmed by Janet Fuentes ([id number]) on 8/27/2018 10:18:31 AM 
  
 
 
Cardiology Labs: 
Lab Results Component Value Date/Time Cholesterol, total 219 (H) 11/06/2018 08:49 AM  
 HDL Cholesterol 61 11/06/2018 08:49 AM  
 LDL, calculated 138 (H) 11/06/2018 08:49 AM  
 Triglyceride 99 11/06/2018 08:49 AM  
 CHOL/HDL Ratio 3.7 01/18/2018 04:40 AM  
 
 
Lab Results Component Value Date/Time Sodium 142 11/06/2018 08:49 AM  
 Potassium 5.3 (H) 11/06/2018 08:49 AM  
 Chloride 100 11/06/2018 08:49 AM  
 CO2 26 11/06/2018 08:49 AM  
 Anion gap 7 08/26/2018 05:40 PM  
 Glucose 136 (H) 11/06/2018 08:49 AM  
 BUN 12 11/06/2018 08:49 AM  
 Creatinine 1.18 11/06/2018 08:49 AM  
 BUN/Creatinine ratio 10 11/06/2018 08:49 AM  
 GFR est AA 78 11/06/2018 08:49 AM  
 GFR est non-AA 67 11/06/2018 08:49 AM  
 Calcium 10.1 11/06/2018 08:49 AM  
 Bilirubin, total 0.4 08/26/2018 05:40 PM  
 AST (SGOT) 16 08/26/2018 05:40 PM  
 Alk. phosphatase 90 08/26/2018 05:40 PM  
 Protein, total 7.7 08/26/2018 05:40 PM  
 Albumin 3.7 08/26/2018 05:40 PM  
 Globulin 4.0 08/26/2018 05:40 PM  
 A-G Ratio 0.9 (L) 08/26/2018 05:40 PM  
 ALT (SGPT) 24 08/26/2018 05:40 PM  
  
 
 
 Assessment: 
 
 Assessment: ICD-10-CM ICD-9-CM 1. Atrial flutter, unspecified type ([facility] I48.92 427.32 AMB POC EKG ROUTINE W/ 12 LEADS, INTER & REP 2. Saddle embolus of pulmonary artery without acute cor pulmonale, unspecified chronicity (HCC) I26.92 415.13   
3. Essential hypertension I10 401.9 4. Mixed hyperlipidemia E78.2 272.2 Plan: 1. Atrial flutter, unspecified type ([address] Hx of PAF s/p MIQUEL/DCCV. Maintaining sinus rhythm. Echo in 1/2018 with preserved LVEF 55-60% Is on Eliquis therapy for stroke prevention and saddle PE. Anticipates coming off [address] in another 2 more months, and starting ASA therapy. 2. Saddle embolus of pulmonary artery without acute cor pulmonale, unspecified chronicity ([address] With unprovoked saddle embolus diagnosed early last year 2018. Followed by heme/onc, whose reccommedations were to continue Eliquis for another six months from 11/2018. 3. Essential hypertension BP controlled today; continue anti-hypertensive and low sodium diet. 4. Mixed hyperlipidemia ;  in 11/2018. Was started on Lipitor in 11/2018, but has not started this. He wants to lower his numbers with diet and exercise first. 
Discussed low fat, low cholesterol diet and exercise daily. Lipids managed by Ms. Trevizo. Alexus Camacho NP I discussed the risks/benefits/alternatives of the procedure with the patient. Risks include (but are not limited to) bleeding, infection, cva/mi/tamponade/death. The patient understands and agrees to proceed. - Paroxysmal atrial fib/flutter in setting of PE: Maintaining sinus rhythm. He wants to stop DOAC. With CHADVASc score does not need oral AC for a. Fib.   
 
Jolie Urban MD

## 2019-04-09 NOTE — PROGRESS NOTES
1. Have you been to the ER, urgent care clinic since your last visit? Hospitalized since your last visit? NO 
 
2. Have you seen or consulted any other health care providers outside of the [address] [place] [address] since your last visit? Include any pap smears or colon screening. NO 
 
6 MONTH FOLLOW UP. NO CARDIAC C/O.

## 2019-04-16 DIAGNOSIS — I26.92 ACUTE SADDLE PULMONARY EMBOLISM WITHOUT ACUTE COR PULMONALE (HCC): ICD-10-CM

## 2019-04-16 RX ORDER — METOPROLOL TARTRATE 50 MG/1
TABLET ORAL
Qty: 60 TAB | Refills: 0 | Status: SHIPPED | OUTPATIENT
Start: 2019-04-16 | End: 2019-05-07 | Stop reason: SDUPTHER

## 2019-04-17 RX ORDER — APIXABAN 5 MG/1
TABLET, FILM COATED ORAL
Qty: 60 TAB | Refills: 0 | Status: SHIPPED | OUTPATIENT
Start: 2019-04-17 | End: 2019-10-31

## 2019-04-23 ENCOUNTER — LAB ONLY (OUTPATIENT)
Dept: FAMILY MEDICINE CLINIC | Age: 59
End: 2019-04-23

## 2019-04-23 DIAGNOSIS — E11.9 CONTROLLED TYPE 2 DIABETES MELLITUS WITHOUT COMPLICATION, WITHOUT LONG-TERM CURRENT USE OF INSULIN (HCC): Primary | ICD-10-CM

## 2019-04-23 DIAGNOSIS — E55.9 VITAMIN D DEFICIENCY: ICD-10-CM

## 2019-04-23 DIAGNOSIS — Z12.5 PROSTATE CANCER SCREENING: ICD-10-CM

## 2019-04-23 LAB — HBA1C MFR BLD HPLC: 5.9 %

## 2019-04-23 NOTE — PROGRESS NOTES
Placed lab orders A1C, PSA, Lipid, CMP, and Vitamin D, with verbal order with read back per ELIAZAR Mares.

## 2019-04-23 NOTE — PROGRESS NOTES
Patient comes in today for fasting labs. Has appointment on 5/7/19 to discuss labs. ICD-10-CM ICD-9-CM    1.  Controlled type 2 diabetes mellitus without complication, without long-term current use of insulin (HCC) E11.9 250.00 AMB POC HEMOGLOBIN C5C      METABOLIC PANEL, COMPREHENSIVE      LIPID PANEL   2. Vitamin D deficiency E55.9 268.9 VITAMIN D, 25 HYDROXY   3. Prostate cancer screening Z12.5 V76.44 PSA W/ REFLX FREE PSA     Orders Placed This Encounter    METABOLIC PANEL, COMPREHENSIVE    LIPID PANEL    VITAMIN D, 25 HYDROXY    PSA W/ REFLX FREE PSA    AMB POC HEMOGLOBIN A1C

## 2019-04-24 LAB
25(OH)D3+25(OH)D2 SERPL-MCNC: 29.3 NG/ML (ref 30–100)
ALBUMIN SERPL-MCNC: 4.5 G/DL (ref 3.5–5.5)
ALBUMIN/GLOB SERPL: 1.6 {RATIO} (ref 1.2–2.2)
ALP SERPL-CCNC: 84 IU/L (ref 39–117)
ALT SERPL-CCNC: 21 IU/L (ref 0–44)
AST SERPL-CCNC: 18 IU/L (ref 0–40)
BILIRUB SERPL-MCNC: 0.7 MG/DL (ref 0–1.2)
BUN SERPL-MCNC: 11 MG/DL (ref 6–24)
BUN/CREAT SERPL: 9 (ref 9–20)
CALCIUM SERPL-MCNC: 10 MG/DL (ref 8.7–10.2)
CHLORIDE SERPL-SCNC: 101 MMOL/L (ref 96–106)
CHOLEST SERPL-MCNC: 228 MG/DL (ref 100–199)
CO2 SERPL-SCNC: 28 MMOL/L (ref 20–29)
CREAT SERPL-MCNC: 1.26 MG/DL (ref 0.76–1.27)
GLOBULIN SER CALC-MCNC: 2.9 G/DL (ref 1.5–4.5)
GLUCOSE SERPL-MCNC: 134 MG/DL (ref 65–99)
HDLC SERPL-MCNC: 60 MG/DL
INTERPRETATION, 910389: NORMAL
LDLC SERPL CALC-MCNC: 148 MG/DL (ref 0–99)
Lab: NORMAL
POTASSIUM SERPL-SCNC: 5 MMOL/L (ref 3.5–5.2)
PROT SERPL-MCNC: 7.4 G/DL (ref 6–8.5)
PSA SERPL-MCNC: 0.5 NG/ML (ref 0–4)
REFLEX CRITERIA: NORMAL
SODIUM SERPL-SCNC: 140 MMOL/L (ref 134–144)
TRIGL SERPL-MCNC: 100 MG/DL (ref 0–149)
VLDLC SERPL CALC-MCNC: 20 MG/DL (ref 5–40)

## 2019-05-07 ENCOUNTER — OFFICE VISIT (OUTPATIENT)
Dept: FAMILY MEDICINE CLINIC | Age: 59
End: 2019-05-07

## 2019-05-07 VITALS
OXYGEN SATURATION: 98 % | TEMPERATURE: 96.3 F | RESPIRATION RATE: 18 BRPM | HEART RATE: 58 BPM | DIASTOLIC BLOOD PRESSURE: 85 MMHG | HEIGHT: 72 IN | SYSTOLIC BLOOD PRESSURE: 136 MMHG | BODY MASS INDEX: 36.62 KG/M2 | WEIGHT: 270.4 LBS

## 2019-05-07 DIAGNOSIS — E11.9 CONTROLLED TYPE 2 DIABETES MELLITUS WITHOUT COMPLICATION, WITHOUT LONG-TERM CURRENT USE OF INSULIN (HCC): Primary | ICD-10-CM

## 2019-05-07 DIAGNOSIS — E66.01 SEVERE OBESITY (BMI 35.0-39.9) WITH COMORBIDITY (HCC): ICD-10-CM

## 2019-05-07 DIAGNOSIS — Z86.711 HISTORY OF PULMONARY EMBOLISM: ICD-10-CM

## 2019-05-07 DIAGNOSIS — E55.9 VITAMIN D DEFICIENCY: ICD-10-CM

## 2019-05-07 DIAGNOSIS — E78.5 HYPERLIPIDEMIA, UNSPECIFIED HYPERLIPIDEMIA TYPE: ICD-10-CM

## 2019-05-07 DIAGNOSIS — I10 ESSENTIAL HYPERTENSION WITH GOAL BLOOD PRESSURE LESS THAN 130/80: ICD-10-CM

## 2019-05-07 DIAGNOSIS — I48.92 ATRIAL FLUTTER, UNSPECIFIED TYPE (HCC): ICD-10-CM

## 2019-05-07 NOTE — PROGRESS NOTES
Chief Complaint Patient presents with  Diabetes Concerned with not losing weight. 1. Have you been to the ER, urgent care clinic since your last visit? Hospitalized since your last visit? No 
 
2. Have you seen or consulted any other health care providers outside of the [address] [place] [address] since your last visit? Include any pap smears or colon screening. No 
 
Health Maintenance Due Topic Date Due  Pneumococcal 0-64 years (1 of 1 - PPSV23) 08/23/1965  
 EYE EXAM RETINAL OR DILATED  08/23/1969  COLONOSCOPY  08/23/1977

## 2019-05-07 NOTE — PROGRESS NOTES
HISTORY OF PRESENT ILLNESS Annetta Germain is a 60 y.o. male. HPI  Patient comes in today for diabetes and HTN follow up. Has follow up with oncology this Friday. Will discuss if they take him off Eliquis. If they take him off, cardiology wants him to take ASA daily. He is walking 4-5 days per week. Will walk 4 miles. Trying to get back into running. Also working out in yard. Not taking atorvastatin. Patient to work on diet and exercise. Does not eat beef and cheese. Will eat sausage 2-3 times weekly on [address]. Going towards a more plant based diet. He ate a KIND bar this morning prior to appointment. Plans on going walking after appointment. He bowls every week. Cut out cheese. Eats mostly fish, chicken and turkey. Minimal pork. Allergies Allergen Reactions  Ibuprofen Other (comments) Increase B/P Past Medical History:  
Diagnosis Date  Atrial flutter ([address] 1/18/2018  Atrial flutter ([address] 1/18/2018  Cerumen impaction 10/14/2013  Controlled type 2 diabetes mellitus without complication, without long-term current use of insulin ([address]  Controlled type 2 diabetes mellitus without complication, without long-term current use of insulin ([address] 1/29/2018  Elevated blood pressure 10/14/2013  Essential hypertension  Hyperlipidemia with target LDL less than 100 11/26/2013  Obesity, unspecified 10/10/2013  Pulmonary emboli ([address] 01/19/2018 [facility] -  
 Pulmonary embolism ([address] 1/16/2018  Vitamin D deficiency 1/29/2018 History reviewed. No pertinent surgical history. Social History Socioeconomic History  Marital status:  Spouse name: Not on file  Number of children: Not on file  Years of education: Not on file  Highest education level: Not on file Occupational History  Not on file Social Needs  Financial resource strain: Not on file  Food insecurity:  
  Worry: Not on file Inability: Not on file  Transportation needs:  
  Medical: Not on file Non-medical: Not on file Tobacco Use  Smoking status: Never Smoker  Smokeless tobacco: Never Used Substance and Sexual Activity  Alcohol use: No  
 Drug use: No  
 Sexual activity: Yes  
  Partners: Female Lifestyle  Physical activity:  
  Days per week: Not on file Minutes per session: Not on file  Stress: Not on file Relationships  Social connections:  
  Talks on phone: Not on file Gets together: Not on file Attends Taoist service: Not on file Active member of club or organization: Not on file Attends meetings of clubs or organizations: Not on file Relationship status: Not on file  Intimate partner violence:  
  Fear of current or ex partner: Not on file Emotionally abused: Not on file Physically abused: Not on file Forced sexual activity: Not on file Other Topics Concern [address] Service Not Asked  Blood Transfusions Not Asked  Caffeine Concern Not Asked  Occupational Exposure Not Asked [place] Hazards Not Asked  Sleep Concern Not Asked  Stress Concern Not Asked  Weight Concern Not Asked  Special Diet Not Asked  Back Care Not Asked  Exercise Not Asked  Bike Helmet Not Asked  Seat Belt Not Asked  Self-Exams Not Asked Social History Narrative  to [employer] Family History Problem Relation Age of Onset  Hypertension Mother  Hypertension Father Current Outpatient Medications Medication Sig  ELIQUIS 5 mg tablet TAKE 1 TABLET BY MOUTH TWICE DAILY  cholecalciferol (VITAMIN D3) 1,000 unit cap Take  by mouth daily.  metoprolol tartrate (LOPRESSOR) 50 mg tablet TAKE 1 TABLET BY MOUTH EVERY 12 HOURS  atorvastatin (LIPITOR) 10 mg tablet Take 1 Tab by mouth daily. (Patient not taking: Reported on 5/7/2019) No current facility-administered medications for this visit. Review of Systems Constitutional: Negative for chills, diaphoresis, fever, malaise/fatigue and weight loss. HENT: Negative for congestion, ear pain, sore throat and tinnitus. Eyes: Negative for blurred vision and double vision. Respiratory: Negative for cough, sputum production, shortness of breath and wheezing. Cardiovascular: Negative for chest pain, palpitations and leg swelling. Gastrointestinal: Negative for abdominal pain, blood in stool, constipation, diarrhea, nausea and vomiting. Genitourinary: Negative for dysuria, flank pain, frequency, hematuria and urgency. Musculoskeletal: Negative for back pain, joint pain and myalgias. Skin: Negative. Neurological: Negative for dizziness, tingling, speech change, focal weakness and headaches. Psychiatric/Behavioral: Negative for depression. The patient is not nervous/anxious and does not have insomnia. Vitals:  
 05/07/19 0839 BP: 136/85 Pulse: (!) 58 Resp: 18 Temp: 96.3 °F (35.7 °C) TempSrc: Oral  
SpO2: 98% Weight: 270 lb 6.4 oz (122.7 kg) Height: 6' (1.829 m) Physical Exam  
Constitutional: He is oriented to person, place, and time. Vital signs are normal. He appears well-developed and well-nourished. He is cooperative. HENT:  
Right Ear: Hearing, tympanic membrane, external ear and ear canal normal.  
Left Ear: Hearing, tympanic membrane, external ear and ear canal normal.  
Nose: Nose normal.  
Mouth/Throat: Uvula is midline, oropharynx is clear and moist and mucous membranes are normal.  
Cardiovascular: Normal rate, regular rhythm and normal heart sounds. Pulses: 
     Dorsalis pedis pulses are 2+ on the right side, and 2+ on the left side. Trace BLE edema Pulmonary/Chest: Effort normal and breath sounds normal.  
Neurological: He is alert and oriented to person, place, and time. Skin: Skin is warm, dry and intact. Psychiatric: He has a normal mood and affect.  His behavior is normal. Judgment and thought content normal.  
 
ASSESSMENT and PLAN 
  ICD-10-CM ICD-9-CM 1. Controlled type 2 diabetes mellitus without complication, without long-term current use of insulin (HCC) E11.9 250.00   
2. Essential hypertension with goal blood pressure less than 130/80 I10 401.9 3. Hyperlipidemia, unspecified hyperlipidemia type E78.5 272.4 4. Vitamin D deficiency E55.9 268.9   
5. History of pulmonary embolism Z86.711 V12.55   
6. Atrial flutter, unspecified type ([facility]) I48.92 427.32   
7. Severe obesity (BMI 35.0-39. 9) with comorbidity ([address] E66.01 278.01 Encounter Diagnoses Name Primary?  Controlled type 2 diabetes mellitus without complication, without long-term current use of insulin ([address] Yes  Essential hypertension with goal blood pressure less than 130/80  Hyperlipidemia, unspecified hyperlipidemia type  Vitamin D deficiency  History of pulmonary embolism  Atrial flutter, unspecified type ([address]  Severe obesity (BMI 35.0-39. 9) with comorbidity ([address] No orders of the defined types were placed in this encounter. Diagnoses and all orders for this visit: 1. Controlled type 2 diabetes mellitus without complication, without long-term current use of insulin ([address] - had fasting labs 2 weeks ago. A1c 5.9%. Continue to work on diet and weight loss 2. Essential hypertension with goal blood pressure less than 130/80 - stable. 3. Hyperlipidemia, unspecified hyperlipidemia type- LDL increased from 138 to 148. Patient still declines to take atorvastatin. Wishes to continue to work on diet and exercise, weight reduction. Discussed with patient risks of elevated LDL, including stroke and MI 
 
4. Vitamin D deficiency - continue Vitamin D OTC 5. History of pulmonary embolism - has appointment with oncology this week to determine if patient will stop NOAC 6.  Atrial flutter, unspecified type ([address] - seen by cardiology 1 month ago.  Continuing on metoprolol 7. Severe obesity (BMI 35.0-39. 9) with comorbidity ([address] Follow-up and Dispositions · Return in about 6 months (around 11/7/2019). lab results and schedule of future lab studies reviewed with patient 
reviewed diet, exercise and weight control 
cardiovascular risk and specific lipid/LDL goals reviewed I have reviewed the patient's allergies and made any necessary changes. Medical, procedural, social and family histories have been reviewed and updated as medically indicated. I have reconciled and/or revised patient medications in the EMR. I have discussed each diagnosis listed in this note with Felicia Phan and/or their family. I have discussed treatment options and the risk/benefit analysis of those options, including safe use of medications and possible medication side effects. Through the use of shared decision making we have agreed to the above plan. The patient has received an after-visit summary and questions were answered concerning future plans. Anjali Trevizo, ELIAZAR-C This note will not be viewable in [address]

## 2019-05-10 ENCOUNTER — OFFICE VISIT (OUTPATIENT)
Dept: ONCOLOGY | Age: 59
End: 2019-05-10

## 2019-05-10 VITALS
HEART RATE: 64 BPM | TEMPERATURE: 97.8 F | BODY MASS INDEX: 36.57 KG/M2 | RESPIRATION RATE: 18 BRPM | DIASTOLIC BLOOD PRESSURE: 84 MMHG | WEIGHT: 270 LBS | OXYGEN SATURATION: 97 % | HEIGHT: 72 IN | SYSTOLIC BLOOD PRESSURE: 124 MMHG

## 2019-05-10 DIAGNOSIS — Z86.711 HISTORY OF PULMONARY EMBOLISM: Primary | ICD-10-CM

## 2019-05-10 NOTE — PROGRESS NOTES
[address] [facility] 
at [place] [address], [facility], [address] 
[phone] Hematology Follow Up Patient: Gabriela Longo MRN: [id number]  SSN: [id number] YOB: 1960  Age: 60 y.o. Sex: male Subjective:  
  
Gabriela Longo is a 60 y.o. male who with a diagnosis of PE. 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 He has been taking Apixaban. He is able to exercise better. No bleeding. He has also been diagnosed with A. Fib/A. flutter and is s/p MIQUEL/DCCV and remains in sinus rhythm. Review of Systems: 
 
Constitutional: negative Eyes: negative Ears, Nose, Mouth, Throat, and Face: negative Respiratory: dyspnea Cardiovascular: negative Gastrointestinal: negative Genitourinary:negative Integument/Breast: negative Hematologic/Lymphatic: negative Musculoskeletal:negative Neurological: negative Past Medical History:  
Diagnosis Date  Atrial flutter ([address] 1/18/2018  Atrial flutter ([address] 1/18/2018  Cerumen impaction 10/14/2013  Controlled type 2 diabetes mellitus without complication, without long-term current use of insulin ([address]  Controlled type 2 diabetes mellitus without complication, without long-term current use of insulin ([address] 1/29/2018  Elevated blood pressure 10/14/2013  Essential hypertension  Hyperlipidemia with target LDL less than 100 11/26/2013  Obesity, unspecified 10/10/2013  Pulmonary emboli ([address] 01/19/2018 [id number] -  
 Pulmonary embolism ([address] 1/16/2018  Vitamin D deficiency 1/29/2018 History reviewed. No pertinent surgical history. Family History Problem Relation Age of Onset  Hypertension Mother  Hypertension Father  Kidney Disease Father [de-identified]  
     on dialysis Social History Tobacco Use  Smoking status: Never Smoker  Smokeless tobacco: Never Used Substance Use Topics  Alcohol use: No  
  
Prior to Admission medications Medication Sig Start Date End Date Taking? Authorizing Provider ELIQUIS 5 mg tablet TAKE 1 TABLET BY MOUTH TWICE DAILY 4/17/19  Yes Maday Sanders MD  
cholecalciferol (VITAMIN D3) 1,000 unit cap Take  by mouth daily. Yes Provider, Historical  
metoprolol tartrate (LOPRESSOR) 50 mg tablet TAKE 1 TABLET BY MOUTH EVERY 12 HOURS 2/16/18  Yes Jodee Torres, NP Allergies Allergen Reactions  Ibuprofen Other (comments) Increase B/P Objective:  
 
Vitals:  
 05/10/19 1030 BP: 124/84 Pulse: 64 Resp: 18 Temp: 97.8 °F (36.6 °C) TempSrc: Oral  
SpO2: 97% Weight: 270 lb (122.5 kg) Height: 6' (1.829 m) Physical Exam: 
 
GENERAL: alert, cooperative, no distress, appears stated age EYE: negative LYMPHATIC: Cervical, supraclavicular, and axillary nodes normal.  
THROAT & NECK: normal and no erythema or exudates noted. LUNG: clear to auscultation bilaterally HEART: regular rate and rhythm ABDOMEN: soft, non-tender EXTREMITIES:  no edema SKIN: Normal. 
NEUROLOGIC: negative Assessment: 1. Pulmonary embolism: 
 
> 1st episode/event of  PE in 01/2018 - unprovoked On Apixaban Dyspnea on exertion is improving. He has completed his 12 months of systemic anticoagulation. We shall discontinue Apixaban and switch to an ASA daily. ASA has been shown to reduce the risk of recurrence of PE/DVT by 40%. Plan:  
 
 
1. D/C Apixaban 2. Start  3. Return in 1 yr Signed by: Pk Drew MD 
                   May 11, 2019 
 
 
 
CC.  Sudarshan Guevara NP

## 2019-05-10 NOTE — PROGRESS NOTES
Identified pt with two pt identifiers(name and ). Reviewed record in preparation for visit and have obtained necessary documentation. Chief Complaint Patient presents with  
 Other PE 6 month follow up Visit Vitals /84 (BP 1 Location: Left arm, BP Patient Position: Sitting) Pulse 64 Temp 97.8 °F (36.6 °C) (Oral) Resp 18 Ht 6' (1.829 m) Wt 270 lb (122.5 kg) SpO2 97% BMI 36.62 kg/m² Health Maintenance Due Topic  Pneumococcal 0-64 years (1 of 1 - PPSV23)  EYE EXAM RETINAL OR DILATED  COLONOSCOPY Coordination of Care Questionnaire: 
:  
1) Have you been to an emergency room, urgent care, or hospitalized since your last visit? If yes, where when, and reason for visit? no  
 
 
2. Have seen or consulted any other health care provider since your last visit? If yes, where when, and reason for visit? NO 
 
 
3) Do you have an Advanced Directive/ Living Will in place? NO If yes, do we have a copy on file NO If no, would you like information NO Patient is accompanied by self I have received verbal consent from Mee Guillaume to discuss any/all medical information while they are present in the room.

## 2019-05-13 NOTE — PROGRESS NOTES
RECOMMENDATIONS:  Excellent control of diabetes as discussed in office. Restart atorvastatin for cholesterol as discussed in office.     Liver and kidney function normal.  Prostate cancer screening normal.

## 2019-05-16 RX ORDER — METOPROLOL TARTRATE 50 MG/1
TABLET ORAL
Qty: 60 TAB | Refills: 0 | Status: SHIPPED | OUTPATIENT
Start: 2019-05-16 | End: 2019-06-15 | Stop reason: SDUPTHER

## 2019-06-07 ENCOUNTER — TELEPHONE (OUTPATIENT)
Dept: ONCOLOGY | Age: 59
End: 2019-06-07

## 2019-06-07 NOTE — TELEPHONE ENCOUNTER
Patient called and stated that he needs to know how much Asprin he needs to be taking since he got off ellaquis and has a rash and just feels bad.      # [phone]

## 2019-06-07 NOTE — TELEPHONE ENCOUNTER
Return call placed to pt. HIPPA verified. Patient reports taking  mg and feeling tired since he stopped the Eliquis. Patient denies SOB. Nurse confirmed pt should be taking  mg and he should contact his PCP to discuss the sudden fatigue. Patient verbalized understanding.

## 2019-06-11 NOTE — PROGRESS NOTES
HISTORY OF PRESENT ILLNESS Miryam Xiong is a 60 y.o. male. HPI  Patient comes in today for follow up. Patient states he feels good, but feels like he is limited, scared to lift heavy objects since having PE. States he will run 1/2 of a 1/4 mile track. Does not want to overdo it. He bowls every week. Patient is interested in stopping DOACs, has appointment with heme/omc on Friday. States he discussed with cardiology but they deferred to heme/onc Cut out cheese. Eats mostly fish, chicken and turkey. Minimal pork. Allergies Allergen Reactions  Ibuprofen Other (comments) Increase B/P Past Medical History:  
Diagnosis Date  Atrial flutter ([address] 1/18/2018  Atrial flutter ([address] 1/18/2018  Cerumen impaction 10/14/2013  Controlled type 2 diabetes mellitus without complication, without long-term current use of insulin ([address]  Controlled type 2 diabetes mellitus without complication, without long-term current use of insulin ([address] 1/29/2018  Elevated blood pressure 10/14/2013  Hyperlipidemia with target LDL less than 100 11/26/2013  Obesity, unspecified 10/10/2013  Pulmonary emboli ([address] 01/19/2018 [facility] -  
 Pulmonary embolism ([facility] 1/16/2018  Vitamin D deficiency 1/29/2018 History reviewed. No pertinent surgical history. Social History Socioeconomic History  Marital status:  Spouse name: Not on file  Number of children: Not on file  Years of education: Not on file  Highest education level: Not on file Social Needs  Financial resource strain: Not on file  Food insecurity - worry: Not on file  Food insecurity - inability: Not on file  Transportation needs - medical: Not on file  Transportation needs - non-medical: Not on file Occupational History  Not on file Tobacco Use  Smoking status: Never Smoker  Smokeless tobacco: Never Used Substance and Sexual Activity  Alcohol use:  No  
 [de-identified] : This is very nice 72-year-old female experiencing bilateral buttock pain, which is moderate in intensity.  4 days ago she had a fall when she tripped landing her right side into the wall and then she fell onto her left buttock.  She did not hit her head when she fell.  She has had pain in the low back since then.  The pain moderately limits activities of daily living. Walking tolerance is not reduced.  She cannot take NSAIDs because of her medical history.  She is tried ice and heat which helped mildly.  She has not tried physical therapy for this.  She has a long history of lumbar stenosis.  The patient denies any radiation of the pain to the feet and it is not associated with numbness, tingling, or weakness.  No bowel or bladder incontinence.  She does not use a walker but does use a cane.    Drug use: No  
 Sexual activity: Yes  
  Partners: Female Other Topics Concern [address] Service Not Asked  Blood Transfusions Not Asked  Caffeine Concern Not Asked  Occupational Exposure Not Asked [place] Hazards Not Asked  Sleep Concern Not Asked  Stress Concern Not Asked  Weight Concern Not Asked  Special Diet Not Asked  Back Care Not Asked  Exercise Not Asked  Bike Helmet Not Asked  Seat Belt Not Asked  Self-Exams Not Asked Social History Narrative  to [employer] Family History Problem Relation Age of Onset  Hypertension Mother  Hypertension Father Current Outpatient Medications Medication Sig  
 metoprolol tartrate (LOPRESSOR) 50 mg tablet TAKE 1 TABLET BY MOUTH EVERY 12 HOURS  
 ELIQUIS 5 mg tablet TAKE 1 TABLET BY MOUTH TWICE DAILY  omega 3-dha-epa-fish oil (FISH OIL) 100-160-1,000 mg cap Take  by mouth.  cholecalciferol (VITAMIN D3) 1,000 unit cap Take  by mouth daily.  metoprolol tartrate (LOPRESSOR) 50 mg tablet TAKE 1 TABLET BY MOUTH EVERY 12 HOURS  multivitamin (MULTIPLE VITAMIN PO) Take  by mouth as needed.  cyanocobalamin 1,000 mcg tablet Take 1,000 mcg by mouth daily. No current facility-administered medications for this visit. Review of Systems Constitutional: Negative for chills, diaphoresis, fever, malaise/fatigue and weight loss. HENT: Negative for congestion, ear pain, sore throat and tinnitus. Eyes: Negative for blurred vision and double vision. Respiratory: Negative for cough, sputum production, shortness of breath and wheezing. Cardiovascular: Negative for chest pain, palpitations and leg swelling. Gastrointestinal: Negative for abdominal pain, blood in stool, constipation, diarrhea, nausea and vomiting. Genitourinary: Negative for dysuria, flank pain, frequency, hematuria and urgency. Musculoskeletal: Negative for back pain, joint pain and myalgias. Skin: Negative. Neurological: Negative for dizziness, tingling, speech change, focal weakness and headaches. Psychiatric/Behavioral: Negative for depression. The patient is not nervous/anxious and does not have insomnia. Vitals:  
 11/06/18 9035 BP: 139/86 Pulse: (!) 59 Resp: 18 Temp: 98.8 °F (37.1 °C) TempSrc: Oral  
SpO2: 95% Weight: 267 lb 9.6 oz (121.4 kg) Height: 6' (1.829 m) Physical Exam  
Constitutional: He is oriented to person, place, and time. Vital signs are normal. He appears well-developed and well-nourished. He is cooperative. HENT:  
Right Ear: Hearing, tympanic membrane, external ear and ear canal normal.  
Left Ear: Hearing, tympanic membrane, external ear and ear canal normal.  
Nose: Nose normal.  
Mouth/Throat: Uvula is midline, oropharynx is clear and moist and mucous membranes are normal.  
Cardiovascular: Regular rhythm and normal heart sounds. Bradycardia present. Pulmonary/Chest: Effort normal and breath sounds normal.  
Abdominal: Soft. Normal appearance and bowel sounds are normal. There is no hepatosplenomegaly. There is no tenderness. There is no CVA tenderness. Neurological: He is alert and oriented to person, place, and time. Skin: Skin is warm, dry and intact. Diabetic foot exam:  
Left: Intact sensation to monofilament 4/4 locations Position sense intact 2+ DP pulse No foot deformities Right: Intact sensation to monofilament 4/4 locations Position sense intact 2+ DP pulse No foot deformities Psychiatric: He has a normal mood and affect. His behavior is normal. Judgment and thought content normal.  
 
ASSESSMENT and PLAN 
  ICD-10-CM ICD-9-CM 1. Controlled type 2 diabetes mellitus without complication, without long-term current use of insulin (HCC) E11.9 250.00 AMB POC HEMOGLOBIN O7B  
   METABOLIC PANEL, BASIC  
   LIPID PANEL    MICROALBUMIN, UR, RAND W/ MICROALB/CREAT RATIO  
    DIABETES FOOT EXAM  
 2. Severe obesity (BMI 35.0-39. 9) with comorbidity ([facility] E66.01 278.01   
3. Atrial flutter, unspecified type ([facility] I48.92 427.32   
4. History of pulmonary embolism Z86.711 V12.55   
5. History of cardioversion Z98.890 V15.1 6. Need for hepatitis C screening test Z11.59 V73.89 HEPATITIS C AB Encounter Diagnoses Name Primary?  Controlled type 2 diabetes mellitus without complication, without long-term current use of insulin ([facility] Yes  Severe obesity (BMI 35.0-39. 9) with comorbidity ([facility]  Atrial flutter, unspecified type ([facility]  History of pulmonary embolism  History of cardioversion  Need for hepatitis C screening test   
 
Orders Placed This Encounter  METABOLIC PANEL, BASIC  
 LIPID PANEL  
 HEPATITIS C AB  
 MICROALBUMIN, UR, RAND W/ MICROALB/CREAT RATIO  AMB POC HEMOGLOBIN A1C Diagnoses and all orders for this visit: 1. Controlled type 2 diabetes mellitus without complication, without long-term current use of insulin ([facility]- DIET CONTROLLED. A1c 5.9%. Continue with diet and exercise -     AMB POC HEMOGLOBIN N5L 
-     METABOLIC PANEL, BASIC 
-     LIPID PANEL 
-     MICROALBUMIN, UR, RAND W/ MICROALB/CREAT RATIO 
-     DIABETES FOOT EXAM 
 
2. Severe obesity (BMI 35.0-39. 9) with comorbidity ([facility] -     I have reviewed/discussed the above normal BMI with the patient. I have recommended the following interventions: dietary management education, guidance, and counseling and encourage exercise . 3. Atrial flutter, unspecified type [facility]) patient on Eliquis, followed by cardiology. 4. History of pulmonary embolism - patient on Eliquis, followed by heme/onc. Has appointment on Friday to discuss stopping DOACs. 5. History of cardioversion 6. Need for hepatitis C screening test 
-     HEPATITIS C AB Follow-up Disposition: 
Return in about 6 months (around 5/6/2019). lab results and schedule of future lab studies reviewed with patient reviewed diet, exercise and weight control I have reviewed the patient's allergies and made any necessary changes. Medical, procedural, social and family histories have been reviewed and updated as medically indicated. I have reconciled and/or revised patient medications in the EMR. I have discussed each diagnosis listed in this note with Jamilah Pedraza and/or their family. I have discussed treatment options and the risk/benefit analysis of those options, including safe use of medications and possible medication side effects. Through the use of shared decision making we have agreed to the above plan. The patient has received an after-visit summary and questions were answered concerning future plans. Anjali Trevizo, FNP-C This note will not be viewable in [address]

## 2019-06-12 ENCOUNTER — TELEPHONE (OUTPATIENT)
Dept: FAMILY MEDICINE CLINIC | Age: 59
End: 2019-06-12

## 2019-06-12 NOTE — TELEPHONE ENCOUNTER
----- Message from Varghese Salmon sent at 6/12/2019  7:50 AM EDT -----  Regarding: KRISS Bravo        /  Telephone                 Patient suffers from pulmonary embolism and is concerned that he is suddenly feeling fatigued and wants to have nurse to return his call to discuss if any preventative measures should be taken. Also experiencing muscle soreness in chest area along with a cough. Transferred patient to on call service to page the doctor. Patient wanted to speak with a provider first to determine if he needed to be seen. Best contact number is [phone].

## 2019-06-12 NOTE — TELEPHONE ENCOUNTER
----- Message from Holland Dean sent at 6/12/2019  8:29 AM EDT -----  Regarding: Dr. Christiana Amin having strange feeling in chest, dull cough and sluggish. After finishing  Eliquis 2 weeks ago pt states he declined . Tried to reach backline twice no answer. Best contact [phone].

## 2019-06-12 NOTE — TELEPHONE ENCOUNTER
Verified patient with two type of identifiers. Spoke with pt and he states that his chest discomfort feels more just like mucus stuck in his chest. Pt states has been walking 4 miles daily as well as pushups and cut grass yesterday and just has felt more sluggish. Pt states has not increased water intake and has not taken any allergy medication in the last few days. Per Say Stout, NP pt to increase water intake due to higher outside temperatures and to take allergy medication daily. Pt to go to ED if chest discomfort feels like pain/pressure and/or if he becomes SOB. Pt verbalized understanding.

## 2019-06-17 RX ORDER — METOPROLOL TARTRATE 50 MG/1
TABLET ORAL
Qty: 60 TAB | Refills: 0 | Status: SHIPPED | OUTPATIENT
Start: 2019-06-17 | End: 2019-06-21 | Stop reason: SDUPTHER

## 2019-06-21 ENCOUNTER — OFFICE VISIT (OUTPATIENT)
Dept: FAMILY MEDICINE CLINIC | Age: 59
End: 2019-06-21

## 2019-06-21 VITALS
SYSTOLIC BLOOD PRESSURE: 138 MMHG | HEIGHT: 72 IN | OXYGEN SATURATION: 97 % | TEMPERATURE: 96.1 F | HEART RATE: 57 BPM | DIASTOLIC BLOOD PRESSURE: 86 MMHG | BODY MASS INDEX: 36.92 KG/M2 | WEIGHT: 272.6 LBS | RESPIRATION RATE: 18 BRPM

## 2019-06-21 DIAGNOSIS — R53.83 FATIGUE, UNSPECIFIED TYPE: Primary | ICD-10-CM

## 2019-06-21 DIAGNOSIS — E66.01 SEVERE OBESITY (BMI 35.0-39.9) WITH COMORBIDITY (HCC): ICD-10-CM

## 2019-06-21 RX ORDER — ASPIRIN 325 MG
325 TABLET ORAL DAILY
COMMUNITY
End: 2020-09-30 | Stop reason: ALTCHOICE

## 2019-06-21 NOTE — PROGRESS NOTES
HISTORY OF PRESENT ILLNESS  Rashad Langley is a 60 y.o. male. HPI  Patient comes in today for feeling sluggish. Pt states x 2 weeks felt sluggish after finishing Eliquis. States he felt like that for couple weeks, but in last 2 days has felt good. Walked 3-4 miles today and felt normal.  Does not feel sluggish anymore. Eats fish, chicken. Rare pork 2-3 times weekly. No beef, no cheese. Allergies   Allergen Reactions    Ibuprofen Other (comments)     Increase B/P       Past Medical History:   Diagnosis Date    Atrial flutter ([address] 1/18/2018    Atrial flutter ([address] 1/18/2018    Cerumen impaction 10/14/2013    Controlled type 2 diabetes mellitus without complication, without long-term current use of insulin ([address]     Controlled type 2 diabetes mellitus without complication, without long-term current use of insulin ([address] 1/29/2018    Elevated blood pressure 10/14/2013    Essential hypertension     Hyperlipidemia with target LDL less than 100 11/26/2013    Obesity, unspecified 10/10/2013    Pulmonary emboli ([address] 01/19/2018    [facility] -    Pulmonary embolism ([facility] 1/16/2018    Vitamin D deficiency 1/29/2018       History reviewed. No pertinent surgical history.     Social History     Socioeconomic History    Marital status:      Spouse name: Not on file    Number of children: Not on file    Years of education: Not on file    Highest education level: Not on file   Occupational History    Not on file   Social Needs    Financial resource strain: Not on file    Food insecurity:     Worry: Not on file     Inability: Not on file    Transportation needs:     Medical: Not on file     Non-medical: Not on file   Tobacco Use    Smoking status: Never Smoker    Smokeless tobacco: Never Used   Substance and Sexual Activity    Alcohol use: No    Drug use: No    Sexual activity: Yes     Partners: Female   Lifestyle    Physical activity:     Days per week: Not on file     Minutes per session: Not on file    Stress: Not on file   Relationships    Social connections:     Talks on phone: Not on file     Gets together: Not on file     Attends Rastafari service: Not on file     Active member of club or organization: Not on file     Attends meetings of clubs or organizations: Not on file     Relationship status: Not on file    Intimate partner violence:     Fear of current or ex partner: Not on file     Emotionally abused: Not on file     Physically abused: Not on file     Forced sexual activity: Not on file   Other Topics Concern     Service Not Asked    Blood Transfusions Not Asked    Caffeine Concern Not Asked    Occupational Exposure Not Asked   [place] Hazards Not Asked    Sleep Concern Not Asked    Stress Concern Not Asked    Weight Concern Not Asked    Special Diet Not Asked    Back Care Not Asked    Exercise Not Asked    Bike Helmet Not Asked   [address] Not Asked    Self-Exams Not Asked   Social History Narrative     to [employer]       Family History   Problem Relation Age of Onset    Hypertension Mother     Hypertension Father     Kidney Disease Father [de-identified]        on dialysis       Current Outpatient Medications   Medication Sig    aspirin (ASPIRIN) 325 mg tablet Take 325 mg by mouth daily.  cholecalciferol (VITAMIN D3) 1,000 unit cap Take  by mouth daily.  metoprolol tartrate (LOPRESSOR) 50 mg tablet TAKE 1 TABLET BY MOUTH EVERY 12 HOURS    ELIQUIS 5 mg tablet TAKE 1 TABLET BY MOUTH TWICE DAILY (Patient not taking: Reported on 6/21/2019)     No current facility-administered medications for this visit. Review of Systems   Constitutional: Negative. Respiratory: Negative. Cardiovascular: Negative. Gastrointestinal: Negative. Genitourinary: Negative. Neurological: Negative.       Vitals:    06/21/19 1109   BP: 144/76   Pulse: (!) 57   Resp: 18   Temp: 96.1 °F (35.6 °C)   TempSrc: Oral   SpO2: 97%   Weight: 272 lb 9.6 oz (123.7 kg)   Height: 6' (1.829 m)     Physical Exam   Constitutional: He is oriented to person, place, and time. He appears well-developed and well-nourished. Cardiovascular: Regular rhythm and normal heart sounds. Bradycardia present. 1-2+ pitting edema BLEs   Pulmonary/Chest: Effort normal and breath sounds normal.   Neurological: He is alert and oriented to person, place, and time. Skin: Skin is warm and dry. Psychiatric: He has a normal mood and affect. His behavior is normal. Judgment and thought content normal.   Vitals reviewed. ASSESSMENT and PLAN    ICD-10-CM ICD-9-CM    1. Fatigue, unspecified type R53.83 780.79    2. Severe obesity (BMI 35.0-39. 9) with comorbidity ([facility] E66.01 278.01      Encounter Diagnoses   Name Primary?  Fatigue, unspecified type Yes    Severe obesity (BMI 35.0-39. 9) with comorbidity ([facility]      Orders Placed This Encounter    aspirin (ASPIRIN) 325 mg tablet     Diagnoses and all orders for this visit:    1. Fatigue, unspecified type - RESOLVED    2. Severe obesity (BMI 35.0-39. 9) with comorbidity ([facility]  -     I have reviewed/discussed the above normal BMI with the patient. I have recommended the following interventions: dietary management education, guidance, and counseling and encourage exercise . [facility] Follow-up and Dispositions    · Return if symptoms worsen or fail to improve. I have reviewed the patient's allergies and made any necessary changes. Medical, procedural, social and family histories have been reviewed and updated as medically indicated. I have reconciled and/or revised patient medications in the EMR. I have discussed each diagnosis listed in this note with Layla Avila and/or their family. I have discussed treatment options and the risk/benefit analysis of those options, including safe use of medications and possible medication side effects. Through the use of shared decision making we have agreed to the above plan.       The patient has received an after-visit summary and questions were answered concerning future plans. Anjali Trevizo, FNP-C    This note will not be viewable in [employer].

## 2019-07-16 RX ORDER — METOPROLOL TARTRATE 50 MG/1
TABLET ORAL
Qty: 60 TAB | Refills: 0 | Status: SHIPPED | OUTPATIENT
Start: 2019-07-16 | End: 2019-08-17 | Stop reason: SDUPTHER

## 2019-08-17 RX ORDER — METOPROLOL TARTRATE 50 MG/1
TABLET ORAL
Qty: 60 TAB | Refills: 0 | Status: SHIPPED | OUTPATIENT
Start: 2019-08-17 | End: 2019-09-19 | Stop reason: SDUPTHER

## 2019-09-19 RX ORDER — METOPROLOL TARTRATE 50 MG/1
TABLET ORAL
Qty: 60 TAB | Refills: 0 | Status: SHIPPED | OUTPATIENT
Start: 2019-09-19 | End: 2019-10-21 | Stop reason: SDUPTHER

## 2019-10-22 RX ORDER — METOPROLOL TARTRATE 50 MG/1
TABLET ORAL
Qty: 60 TAB | Refills: 0 | Status: SHIPPED | OUTPATIENT
Start: 2019-10-22 | End: 2019-11-23 | Stop reason: SDUPTHER

## 2019-10-31 ENCOUNTER — APPOINTMENT (OUTPATIENT)
Dept: GENERAL RADIOLOGY | Age: 59
End: 2019-10-31
Attending: EMERGENCY MEDICINE
Payer: COMMERCIAL

## 2019-10-31 ENCOUNTER — HOSPITAL ENCOUNTER (EMERGENCY)
Age: 59
Discharge: HOME | End: 2019-10-31
Attending: EMERGENCY MEDICINE
Payer: COMMERCIAL

## 2019-10-31 ENCOUNTER — APPOINTMENT (OUTPATIENT)
Dept: CT IMAGING | Age: 59
End: 2019-10-31
Attending: EMERGENCY MEDICINE
Payer: COMMERCIAL

## 2019-10-31 VITALS
DIASTOLIC BLOOD PRESSURE: 85 MMHG | WEIGHT: 274.25 LBS | OXYGEN SATURATION: 96 % | RESPIRATION RATE: 20 BRPM | BODY MASS INDEX: 37.15 KG/M2 | HEART RATE: 57 BPM | HEIGHT: 72 IN | SYSTOLIC BLOOD PRESSURE: 146 MMHG | TEMPERATURE: 97.9 F

## 2019-10-31 DIAGNOSIS — R20.2 PARESTHESIA: Primary | ICD-10-CM

## 2019-10-31 LAB
ALBUMIN SERPL-MCNC: 3.4 G/DL (ref 3.5–5)
ALBUMIN/GLOB SERPL: 1 {RATIO} (ref 1.1–2.2)
ALP SERPL-CCNC: 90 U/L (ref 45–117)
ALT SERPL-CCNC: 24 U/L (ref 12–78)
ANION GAP SERPL CALC-SCNC: 7 MMOL/L (ref 5–15)
AST SERPL-CCNC: 17 U/L (ref 15–37)
ATRIAL RATE: 58 BPM
BASOPHILS # BLD: 0 K/UL (ref 0–0.1)
BASOPHILS NFR BLD: 0 % (ref 0–1)
BILIRUB SERPL-MCNC: 0.5 MG/DL (ref 0.2–1)
BUN SERPL-MCNC: 16 MG/DL (ref 6–20)
BUN/CREAT SERPL: 14 (ref 12–20)
CALCIUM SERPL-MCNC: 9.2 MG/DL (ref 8.5–10.1)
CALCULATED P AXIS, ECG09: 32 DEGREES
CALCULATED R AXIS, ECG10: -44 DEGREES
CALCULATED T AXIS, ECG11: 12 DEGREES
CHLORIDE SERPL-SCNC: 106 MMOL/L (ref 97–108)
CO2 SERPL-SCNC: 25 MMOL/L (ref 21–32)
CREAT SERPL-MCNC: 1.18 MG/DL (ref 0.7–1.3)
DIAGNOSIS, 93000: NORMAL
DIFFERENTIAL METHOD BLD: NORMAL
EOSINOPHIL # BLD: 0.2 K/UL (ref 0–0.4)
EOSINOPHIL NFR BLD: 2 % (ref 0–7)
ERYTHROCYTE [DISTWIDTH] IN BLOOD BY AUTOMATED COUNT: 12.3 % (ref 11.5–14.5)
GLOBULIN SER CALC-MCNC: 3.5 G/DL (ref 2–4)
GLUCOSE SERPL-MCNC: 128 MG/DL (ref 65–100)
HCT VFR BLD AUTO: 45.6 % (ref 36.6–50.3)
HGB BLD-MCNC: 14.3 G/DL (ref 12.1–17)
IMM GRANULOCYTES # BLD AUTO: 0 K/UL (ref 0–0.04)
IMM GRANULOCYTES NFR BLD AUTO: 0 % (ref 0–0.5)
LYMPHOCYTES # BLD: 3.5 K/UL (ref 0.8–3.5)
LYMPHOCYTES NFR BLD: 47 % (ref 12–49)
MAGNESIUM SERPL-MCNC: 2.1 MG/DL (ref 1.6–2.4)
MCH RBC QN AUTO: 29.5 PG (ref 26–34)
MCHC RBC AUTO-ENTMCNC: 31.4 G/DL (ref 30–36.5)
MCV RBC AUTO: 94.2 FL (ref 80–99)
MONOCYTES # BLD: 1 K/UL (ref 0–1)
MONOCYTES NFR BLD: 13 % (ref 5–13)
NEUTS SEG # BLD: 2.8 K/UL (ref 1.8–8)
NEUTS SEG NFR BLD: 38 % (ref 32–75)
NRBC # BLD: 0 K/UL (ref 0–0.01)
NRBC BLD-RTO: 0 PER 100 WBC
P-R INTERVAL, ECG05: 202 MS
PHOSPHATE SERPL-MCNC: 3 MG/DL (ref 2.6–4.7)
PLATELET # BLD AUTO: 271 K/UL (ref 150–400)
PMV BLD AUTO: 9.8 FL (ref 8.9–12.9)
POTASSIUM SERPL-SCNC: 3.7 MMOL/L (ref 3.5–5.1)
PROT SERPL-MCNC: 6.9 G/DL (ref 6.4–8.2)
Q-T INTERVAL, ECG07: 418 MS
QRS DURATION, ECG06: 110 MS
QTC CALCULATION (BEZET), ECG08: 410 MS
RBC # BLD AUTO: 4.84 M/UL (ref 4.1–5.7)
SODIUM SERPL-SCNC: 138 MMOL/L (ref 136–145)
TROPONIN I SERPL-MCNC: <0.05 NG/ML
VENTRICULAR RATE, ECG03: 58 BPM
WBC # BLD AUTO: 7.5 K/UL (ref 4.1–11.1)

## 2019-10-31 PROCEDURE — 70450 CT HEAD/BRAIN W/O DYE: CPT

## 2019-10-31 PROCEDURE — 71046 X-RAY EXAM CHEST 2 VIEWS: CPT

## 2019-10-31 PROCEDURE — 84100 ASSAY OF PHOSPHORUS: CPT

## 2019-10-31 PROCEDURE — 84484 ASSAY OF TROPONIN QUANT: CPT

## 2019-10-31 PROCEDURE — 93005 ELECTROCARDIOGRAM TRACING: CPT

## 2019-10-31 PROCEDURE — 36415 COLL VENOUS BLD VENIPUNCTURE: CPT

## 2019-10-31 PROCEDURE — 80053 COMPREHEN METABOLIC PANEL: CPT

## 2019-10-31 PROCEDURE — 83735 ASSAY OF MAGNESIUM: CPT

## 2019-10-31 PROCEDURE — 99285 EMERGENCY DEPT VISIT HI MDM: CPT

## 2019-10-31 PROCEDURE — 85025 COMPLETE CBC W/AUTO DIFF WBC: CPT

## 2019-10-31 NOTE — ED PROVIDER NOTES
EMERGENCY DEPARTMENT HISTORY AND PHYSICAL EXAM      Date: 10/31/2019  Patient Name: Willis Carroll  Patient Age and Sex: 60 y.o. male     History of Presenting Illness     Chief Complaint   Patient presents with    Tingling     reports tingling in left side of face, arm and leg for one week. History Provided By: Patient,    HPI: Willis Carroll  Is a 13-year-old male with past medical history of pulmonary embolus, A. fib/a flutter on Eliquis presenting today with tingling on the left side of his body. He reports that for a week he has been having intermittent symptoms of left-sided tingling in his neck, face, arm, and leg. He denies any other associated symptoms including no chest pain, shortness of breath, cough, leg pain, weakness. Denies prior history of hypertension, stroke, or CAD. Patient reports that he was referred by his primary care provider for evaluation in the emergency department. There are no other complaints, changes, or physical findings at this time. PCP: Guy Sandhoff, NP    No current facility-administered medications on file prior to encounter. Current Outpatient Medications on File Prior to Encounter   Medication Sig Dispense Refill    metoprolol tartrate (LOPRESSOR) 50 mg tablet TAKE 1 TABLET BY MOUTH EVERY 12 HOURS 60 Tab 0    aspirin (ASPIRIN) 325 mg tablet Take 325 mg by mouth daily.  cholecalciferol (VITAMIN D3) 1,000 unit cap Take  by mouth daily.       [DISCONTINUED] ELIQUIS 5 mg tablet TAKE 1 TABLET BY MOUTH TWICE DAILY (Patient not taking: Reported on 6/21/2019) 60 Tab 0    [DISCONTINUED] metoprolol tartrate (LOPRESSOR) 50 mg tablet TAKE 1 TABLET BY MOUTH EVERY 12 HOURS 60 Tab 6       Past History     Past Medical History:  Past Medical History:   Diagnosis Date    Atrial flutter ([address] 1/18/2018    Atrial flutter ([address] 1/18/2018    Cerumen impaction 10/14/2013    Controlled type 2 diabetes mellitus without complication, without long-term current use of insulin ([facility]     Controlled type 2 diabetes mellitus without complication, without long-term current use of insulin ([facility] 1/29/2018    Elevated blood pressure 10/14/2013    Essential hypertension     Hyperlipidemia with target LDL less than 100 11/26/2013    Obesity, unspecified 10/10/2013    Pulmonary emboli ([facility] 01/19/2018    [address] -    Pulmonary embolism ([facility] 1/16/2018    Vitamin D deficiency 1/29/2018       Past Surgical History:  History reviewed. No pertinent surgical history. Family History:  Family History   Problem Relation Age of Onset    Hypertension Mother     Hypertension Father     Kidney Disease Father [de-identified]        on dialysis       Social History:  Social History     Tobacco Use    Smoking status: Never Smoker    Smokeless tobacco: Never Used   Substance Use Topics    Alcohol use: No    Drug use: No       Allergies: Allergies   Allergen Reactions    Ibuprofen Other (comments)     Increase B/P         Review of Systems   Constitutional: No  fever,  No  headache  Skin: No  rash, No  jaundice  HEENT: No  nasal congestion, No  eye drainage. Resp: No cough,  No  wheezing  CV: No chest pain, No  palpitations  GI: No vomiting,  No  diarrhea.,  No  constipation  : No dysuria,  No  hematuria  MSK: No joint pain,  No  trauma  Neuro: No numbness, +  tingling  Psych: No suicidal, No  paranoid      Physical Exam     Patient Vitals for the past 12 hrs:   Temp Pulse Resp BP   10/31/19 0249 97.3 °F (36.3 °C) 61 16 (!) 166/92     General: alert, No acute distress  Eyes: EOMI, normal conjunctiva  ENT: moist mucous membranes. Neck: Active, full ROM of neck. Skin: No rashes. no jaundice              Lungs: Equal chest expansion. no respiratory distress. Heart: regular rate     no peripheral edema     Abd:  non distended soft   Back: Full ROM  MSK: Full, active ROM in all 4 extremities.    Neuro:   II: vision grossly intact  III, IV, VI: Extraocular motion intact, pupils reactive to light  V: facial sensation intact  VII: face symmetric with normal eye closure and smile  VIII: hearing intact to finger rub bilaterally  IX, X: palate elevates symmetrically, phonation normal  XII: tongue midline with normal movements  Motor: normal bulk, tone, and strength bilaterally, no pronator drift  Sensory: normal sensation to light touch in bilateral upper and lower extremities  Coordination: intact to rapid alternating movements  Gait: steady gait with normal steps, and arm swing. Normal romberg. Psych: Cooperative with exam; Appropriate mood and affect             Diagnostic Study Results     Labs -     Recent Results (from the past 12 hour(s))   EKG, 12 LEAD, INITIAL    Collection Time: 10/31/19  3:13 AM   Result Value Ref Range    Ventricular Rate 58 BPM    Atrial Rate 58 BPM    P-R Interval 202 ms    QRS Duration 110 ms    Q-T Interval 418 ms    QTC Calculation (Bezet) 410 ms    Calculated P Axis 32 degrees    Calculated R Axis -44 degrees    Calculated T Axis 12 degrees    Diagnosis       Sinus bradycardia  Left axis deviation  Nonspecific T wave abnormality  When compared with ECG of 26-AUG-2018 17:09,  No significant change was found     CBC WITH AUTOMATED DIFF    Collection Time: 10/31/19  3:33 AM   Result Value Ref Range    WBC 7.5 4.1 - 11.1 K/uL    RBC 4.84 4.10 - 5.70 M/uL    HGB 14.3 12.1 - 17.0 g/dL    HCT 45.6 36.6 - 50.3 %    MCV 94.2 80.0 - 99.0 FL    MCH 29.5 26.0 - 34.0 PG    MCHC 31.4 30.0 - 36.5 g/dL    RDW 12.3 11.5 - 14.5 %    PLATELET 718 137 - 244 K/uL    MPV 9.8 8.9 - 12.9 FL    NRBC 0.0 0  WBC    ABSOLUTE NRBC 0.00 0.00 - 0.01 K/uL    NEUTROPHILS 38 32 - 75 %    LYMPHOCYTES 47 12 - 49 %    MONOCYTES 13 5 - 13 %    EOSINOPHILS 2 0 - 7 %    BASOPHILS 0 0 - 1 %    IMMATURE GRANULOCYTES 0 0.0 - 0.5 %    ABS. NEUTROPHILS 2.8 1.8 - 8.0 K/UL    ABS. LYMPHOCYTES 3.5 0.8 - 3.5 K/UL    ABS. MONOCYTES 1.0 0.0 - 1.0 K/UL    ABS. EOSINOPHILS 0.2 0.0 - 0.4 K/UL    ABS.  BASOPHILS 0.0 0.0 - 0.1 K/UL    ABS. IMM. GRANS. 0.0 0.00 - 0.04 K/UL    DF AUTOMATED     METABOLIC PANEL, COMPREHENSIVE    Collection Time: 10/31/19  4:52 AM   Result Value Ref Range    Sodium 138 136 - 145 mmol/L    Potassium 3.7 3.5 - 5.1 mmol/L    Chloride 106 97 - 108 mmol/L    CO2 25 21 - 32 mmol/L    Anion gap 7 5 - 15 mmol/L    Glucose 128 (H) 65 - 100 mg/dL    BUN 16 6 - 20 MG/DL    Creatinine 1.18 0.70 - 1.30 MG/DL    BUN/Creatinine ratio 14 12 - 20      GFR est AA >60 >60 ml/min/1.73m2    GFR est non-AA >60 >60 ml/min/1.73m2    Calcium 9.2 8.5 - 10.1 MG/DL    Bilirubin, total 0.5 0.2 - 1.0 MG/DL    ALT (SGPT) 24 12 - 78 U/L    AST (SGOT) 17 15 - 37 U/L    Alk. phosphatase 90 45 - 117 U/L    Protein, total 6.9 6.4 - 8.2 g/dL    Albumin 3.4 (L) 3.5 - 5.0 g/dL    Globulin 3.5 2.0 - 4.0 g/dL    A-G Ratio 1.0 (L) 1.1 - 2.2     MAGNESIUM    Collection Time: 10/31/19  4:52 AM   Result Value Ref Range    Magnesium 2.1 1.6 - 2.4 mg/dL   PHOSPHORUS    Collection Time: 10/31/19  4:52 AM   Result Value Ref Range    Phosphorus 3.0 2.6 - 4.7 MG/DL   TROPONIN I    Collection Time: 10/31/19  4:52 AM   Result Value Ref Range    Troponin-I, Qt. <0.05 <0.05 ng/mL       Radiologic Studies -   XR CHEST PA LAT   Final Result   IMPRESSION: No acute process. CT HEAD WO CONT   Final Result   IMPRESSION: No acute intracranial process. CT Results  (Last 48 hours)               10/31/19 0400  CT HEAD WO CONT Final result    Impression:  IMPRESSION: No acute intracranial process. Narrative:  EXAM: CT HEAD WO CONT       INDICATION: left sided paresthesias       COMPARISON: None. CONTRAST: None. TECHNIQUE: Unenhanced CT of the head was performed using 5 mm images. Brain and   bone windows were generated. CT dose reduction was achieved through use of a   standardized protocol tailored for this examination and automatic exposure   control for dose modulation.          FINDINGS:   The ventricles and sulci are normal in size, shape and configuration and   midline. There is no significant white matter disease. There is no intracranial   hemorrhage, extra-axial collection, mass, mass effect or midline shift. The   basilar cisterns are open. No acute infarct is identified. The bone windows   demonstrate no abnormalities. The visualized portions of the paranasal sinuses   and mastoid air cells are clear. CXR Results  (Last 48 hours)               10/31/19 0406  XR CHEST PA LAT Final result    Impression:  IMPRESSION: No acute process. Narrative:  INDICATION: Chest pain       COMPARISON: August 26, 2018       FINDINGS: PA and lateral views of the chest demonstrate a stable   cardiomediastinal silhouette and clear lungs bilaterally. The visualized osseous   structures are unremarkable. Medical Decision Making     Differential Diagnosis: TIA, stroke, arrhythmia, electrolyte derangement, ACS    I reviewed the vital signs, available nursing notes, past medical history, past surgical history, family history and social history and old medical records. On my interpretation, Laboratory workup is significant for unremarkable CBC, electrolytes, troponin is negative, phosphorus and mag are negative  On my interpretation of the radiology studies chest x-ray without significant abnormality, CT head with no evidence of stroke  On my interpretation of the EKG sinus bradycardia with a rate of 58, QTc 410, T wave flattening, but no ST elevation or depression. Management/ED course: Patient presents today with 1 week of left-sided facial, arm, and leg tingling. Negative work-up. Patient is informed of his results. Encouraged to follow-up with his primary care provider and continue taking his medications as prescribed. Subsequently discharged. Dispo: Discharged. The patient has been re-evaluated and is ready for discharge. Reviewed available results with patient.  Counseled patient on diagnosis and care plan. Patient has expressed understanding, and all questions have been answered. Patient agrees with plan and agrees to follow up as recommended, or to return to the ED if their symptoms worsen. Discharge instructions have been provided and explained to the patient, along with reasons to return to the ED. PLAN:  Current Discharge Medication List        2. Follow-up Information     Follow up With Specialties Details Why Contact Info    Silva Guerra NP Nurse Practitioner  As needed [address] [place] [address] Road  [phone]          3. Return to ED if worse     Diagnosis     Clinical Impression:   1.  Paresthesia        Attestations:    Brodie Powell MD

## 2019-10-31 NOTE — ED NOTES
MD Carlito Jimenez reviewed discharge instructions with the patient and spouse. The patient and spouse verbalized understanding. Patient discharged home with stable vitals. Patient out of ED ambulatory with discharge instructions in hand. Opportunity for questions and clarification provided. No further complaints noted at this time.

## 2019-10-31 NOTE — ED NOTES
Patient presents to the ED ambulatory complaining of numbness to his face and tingling on his left side. Patient reports that for a week he has been having intermittent symptoms of left-sided tingling in his neck, face, arm, and leg. He denies any other symptoms including no chest pain, shortness of breath, cough, leg pain, weakness. Patient placed on the monitor x3 and provided with his call bell. Patient's wife remains at bedside.

## 2019-11-05 ENCOUNTER — OFFICE VISIT (OUTPATIENT)
Dept: FAMILY MEDICINE CLINIC | Age: 59
End: 2019-11-05

## 2019-11-05 VITALS
SYSTOLIC BLOOD PRESSURE: 138 MMHG | RESPIRATION RATE: 18 BRPM | HEIGHT: 72 IN | WEIGHT: 272.2 LBS | DIASTOLIC BLOOD PRESSURE: 88 MMHG | OXYGEN SATURATION: 98 % | HEART RATE: 60 BPM | TEMPERATURE: 96 F | BODY MASS INDEX: 36.87 KG/M2

## 2019-11-05 DIAGNOSIS — E78.5 HYPERLIPIDEMIA, UNSPECIFIED HYPERLIPIDEMIA TYPE: ICD-10-CM

## 2019-11-05 DIAGNOSIS — I10 ESSENTIAL HYPERTENSION WITH GOAL BLOOD PRESSURE LESS THAN 130/80: ICD-10-CM

## 2019-11-05 DIAGNOSIS — E11.9 CONTROLLED TYPE 2 DIABETES MELLITUS WITHOUT COMPLICATION, WITHOUT LONG-TERM CURRENT USE OF INSULIN (HCC): Primary | ICD-10-CM

## 2019-11-05 DIAGNOSIS — R20.2 TINGLING OF LEFT ARM AND LEFT SIDE OF FACE: ICD-10-CM

## 2019-11-05 DIAGNOSIS — E66.01 SEVERE OBESITY (BMI 35.0-35.9 WITH COMORBIDITY) (HCC): ICD-10-CM

## 2019-11-05 DIAGNOSIS — Z12.11 COLON CANCER SCREENING: ICD-10-CM

## 2019-11-05 DIAGNOSIS — Z86.711 HISTORY OF PULMONARY EMBOLISM: ICD-10-CM

## 2019-11-05 LAB
BILIRUB UR QL STRIP: NEGATIVE
GLUCOSE UR-MCNC: NEGATIVE MG/DL
HBA1C MFR BLD HPLC: 6 %
KETONES P FAST UR STRIP-MCNC: NEGATIVE MG/DL
PH UR STRIP: 5.5 [PH] (ref 4.6–8)
PROT UR QL STRIP: NEGATIVE
SP GR UR STRIP: 1.02 (ref 1–1.03)
UA UROBILINOGEN AMB POC: NORMAL (ref 0.2–1)
URINALYSIS CLARITY POC: CLEAR
URINALYSIS COLOR POC: YELLOW
URINE BLOOD POC: NORMAL
URINE LEUKOCYTES POC: NEGATIVE
URINE NITRITES POC: NEGATIVE

## 2019-11-05 NOTE — PROGRESS NOTES
HISTORY OF PRESENT ILLNESS  Nuria Garcia is a 60 y.o. male. HPI  Patient comes in today for follow up. Went to ED on 10/31/19 for tingling on left arm and leg. EKG, Head CT and CXR negative. Still having some tingling on left side of head and left arm. Hx PE, no longer taking Eliquis per oncology. Taking ASA daily. He is walking 4-5 days per week. Will walk 4 miles. Trying to get back into running. Also working out in yard. Not taking atorvastatin. Patient to work on diet and exercise. Does not eat beef and cheese. Eats mostly fish, chicken and turkey.  Minimal pork. Going towards a more plant based diet. He is fasting today for appt. He bowls every week. Allergies   Allergen Reactions    Ibuprofen Other (comments)     Increase B/P       Past Medical History:   Diagnosis Date    Atrial flutter ([address] 1/18/2018    Atrial flutter ([address] 1/18/2018    Cerumen impaction 10/14/2013    Controlled type 2 diabetes mellitus without complication, without long-term current use of insulin ([address]     Controlled type 2 diabetes mellitus without complication, without long-term current use of insulin ([address] 1/29/2018    Elevated blood pressure 10/14/2013    Essential hypertension     Hyperlipidemia with target LDL less than 100 11/26/2013    Obesity, unspecified 10/10/2013    Pulmonary emboli ([address] 01/19/2018    [facility] -    Pulmonary embolism ([facility] 1/16/2018    Vitamin D deficiency 1/29/2018       History reviewed. No pertinent surgical history.     Social History     Socioeconomic History    Marital status:      Spouse name: Not on file    Number of children: Not on file    Years of education: Not on file    Highest education level: Not on file   Occupational History    Not on file   Social Needs    Financial resource strain: Not on file    Food insecurity:     Worry: Not on file     Inability: Not on file    Transportation needs:     Medical: Not on file     Non-medical: Not on file   Tobacco Use    Smoking status: Never Smoker    Smokeless tobacco: Never Used   Substance and Sexual Activity    Alcohol use: No    Drug use: No    Sexual activity: Yes     Partners: Female   Lifestyle    Physical activity:     Days per week: Not on file     Minutes per session: Not on file    Stress: Not on file   Relationships    Social connections:     Talks on phone: Not on file     Gets together: Not on file     Attends Confucianist service: Not on file     Active member of club or organization: Not on file     Attends meetings of clubs or organizations: Not on file     Relationship status: Not on file    Intimate partner violence:     Fear of current or ex partner: Not on file     Emotionally abused: Not on file     Physically abused: Not on file     Forced sexual activity: Not on file   Other Topics Concern     Service Not Asked    Blood Transfusions Not Asked    Caffeine Concern Not Asked    Occupational Exposure Not Asked   Jeannette Omari Hazards Not Asked    Sleep Concern Not Asked    Stress Concern Not Asked    Weight Concern Not Asked    Special Diet Not Asked    Back Care Not Asked    Exercise Not Asked    Bike Helmet Not Asked   [address] Not Asked    Self-Exams Not Asked   Social History Narrative     to [employer]       Family History   Problem Relation Age of Onset    Hypertension Mother     Hypertension Father     Kidney Disease Father [de-identified]        on dialysis       Current Outpatient Medications   Medication Sig    metoprolol tartrate (LOPRESSOR) 50 mg tablet TAKE 1 TABLET BY MOUTH EVERY 12 HOURS    aspirin (ASPIRIN) 325 mg tablet Take 325 mg by mouth daily.  cholecalciferol (VITAMIN D3) 1,000 unit cap Take  by mouth daily. No current facility-administered medications for this visit. Review of Systems   Constitutional: Negative for chills, diaphoresis, fever, malaise/fatigue and weight loss.    HENT: Negative for congestion, ear pain, sore throat and tinnitus. Eyes: Negative for blurred vision and double vision. Respiratory: Negative for cough, sputum production, shortness of breath and wheezing. Cardiovascular: Negative for chest pain, palpitations and leg swelling. Gastrointestinal: Negative for abdominal pain, blood in stool, constipation, diarrhea, nausea and vomiting. Genitourinary: Negative for dysuria, flank pain, frequency, hematuria and urgency. Musculoskeletal: Negative for back pain, joint pain and myalgias. Skin: Negative. Neurological: Positive for tingling. Negative for dizziness, sensory change, speech change, focal weakness and headaches. Psychiatric/Behavioral: Negative for depression. The patient is not nervous/anxious and does not have insomnia. Vitals:    11/05/19 0733   BP: 140/88   Pulse: 60   Resp: 18   Temp: 96 °F (35.6 °C)   TempSrc: Oral   SpO2: 98%   Weight: 272 lb 3.2 oz (123.5 kg)   Height: 6' (1.829 m)     Physical Exam   Constitutional: He is oriented to person, place, and time. Vital signs are normal. He appears well-developed and well-nourished. He is cooperative. HENT:   Right Ear: Hearing, tympanic membrane, external ear and ear canal normal.   Left Ear: Hearing, tympanic membrane, external ear and ear canal normal.   Nose: Nose normal.   Mouth/Throat: Uvula is midline, oropharynx is clear and moist and mucous membranes are normal.   Cardiovascular: Normal rate, regular rhythm and normal heart sounds. Pulses:       Dorsalis pedis pulses are 2+ on the right side, and 2+ on the left side. Trace BLE edema   Pulmonary/Chest: Effort normal and breath sounds normal.   Abdominal: Soft. Normal appearance and bowel sounds are normal. There is no hepatosplenomegaly. There is no tenderness. There is no CVA tenderness. Lymphadenopathy:        Head (right side): No submental, no submandibular and no tonsillar adenopathy present.         Head (left side): No submental, no submandibular and no tonsillar adenopathy present. He has no cervical adenopathy. Neurological: He is alert and oriented to person, place, and time. Skin: Skin is warm, dry and intact. Psychiatric: He has a normal mood and affect. His behavior is normal. Judgment and thought content normal.     ASSESSMENT and PLAN    ICD-10-CM ICD-9-CM    1. Controlled type 2 diabetes mellitus without complication, without long-term current use of insulin (HCC) E11.9 250.00 AMB POC HEMOGLOBIN A1C      AMB POC URINALYSIS DIP STICK AUTO W/O MICRO      MICROALBUMIN, UR, RAND W/ MICROALB/CREAT RATIO      LIPID PANEL   2. Essential hypertension with goal blood pressure less than 130/80 I10 401.9 AMB POC URINALYSIS DIP STICK AUTO W/O MICRO      MICROALBUMIN, UR, RAND W/ MICROALB/CREAT RATIO   3. Hyperlipidemia, unspecified hyperlipidemia type E78.5 272.4 LIPID PANEL   4. History of pulmonary embolism Z86.711 V12.55    5. Tingling of left arm and left side of face R20.2 782.0 VITAMIN B12 & FOLATE      TSH 3RD GENERATION   6. Colon cancer screening Z12.11 V76.51 COLOGUARD TEST (FECAL DNA COLORECTAL CANCER SCREENING)   7. Severe obesity (BMI 35.0-35.9 with comorbidity) ([facility] E66.01 278.01     Z68.35 V85.35      Encounter Diagnoses   Name Primary?     Controlled type 2 diabetes mellitus without complication, without long-term current use of insulin (HCC) Yes    Essential hypertension with goal blood pressure less than 130/80     Hyperlipidemia, unspecified hyperlipidemia type     History of pulmonary embolism     Tingling of left arm and left side of face     Colon cancer screening     Severe obesity (BMI 35.0-35.9 with comorbidity) ([facility]      Orders Placed This Encounter    MICROALBUMIN, UR, RAND W/ MICROALB/CREAT RATIO    LIPID PANEL    COLOGUARD TEST (FECAL DNA COLORECTAL CANCER SCREENING)    VITAMIN B12 & FOLATE    TSH 3RD GENERATION    AMB POC HEMOGLOBIN A1C    AMB POC URINALYSIS DIP STICK AUTO W/O MICRO     Diagnoses and all orders for this visit: 1. Controlled type 2 diabetes mellitus without complication, without long-term current use of insulin (HCC)  -     AMB POC HEMOGLOBIN A1C  -     AMB POC URINALYSIS DIP STICK AUTO W/O MICRO  -     MICROALBUMIN, UR, RAND W/ MICROALB/CREAT RATIO  -     LIPID PANEL    2. Essential hypertension with goal blood pressure less than 130/80  -     AMB POC URINALYSIS DIP STICK AUTO W/O MICRO  -     MICROALBUMIN, UR, RAND W/ MICROALB/CREAT RATIO    3. Hyperlipidemia, unspecified hyperlipidemia type  -     LIPID PANEL    4. History of pulmonary embolism    5. Tingling of left arm and left side of face  -     VITAMIN B12 & FOLATE  -     TSH 3RD GENERATION    6. Colon cancer screening  -     COLOGUARD TEST (FECAL DNA COLORECTAL CANCER SCREENING)    7. Severe obesity (BMI 35.0-35.9 with comorbidity) ([facility]  -     I have reviewed/discussed the above normal BMI with the patient. I have recommended the following interventions: dietary management education, guidance, and counseling and encourage exercise . Nhung Rodney Patient declines pneumonia, influenza and Tdap vaccinations. Declines colonoscopy but agreeable to cologuard    Follow-up and Dispositions    · Return in about 6 months (around 5/5/2020), or if symptoms worsen or fail to improve.       lab results and schedule of future lab studies reviewed with patient  reviewed diet, exercise and weight control  cardiovascular risk and specific lipid/LDL goals reviewed    I have reviewed the patient's allergies and made any necessary changes. Medical, procedural, social and family histories have been reviewed and updated as medically indicated. I have reconciled and/or revised patient medications in the EMR. I have discussed each diagnosis listed in this note with Beau Leslie and/or their family. I have discussed treatment options and the risk/benefit analysis of those options, including safe use of medications and possible medication side effects.   Through the use of shared decision making we have agreed to the above plan. The patient has received an after-visit summary and questions were answered concerning future plans. Anjali Trevizo, [id number]    This note will not be viewable in [employer].

## 2019-11-05 NOTE — PROGRESS NOTES
Chief Complaint   Patient presents with    Hypertension    Diabetes    Cholesterol Problem     Pt states tingling has improved for ED visit from 10/31/19. Pt states still some tingling at times in left. 1. Have you been to the ER, urgent care clinic since your last visit? Hospitalized since your last visit? No    2. Have you seen or consulted any other health care providers outside of the [address] [place] [address] since your last visit? Include any pap smears or colon screening. No    Eye Exam - Pt aware overdue  Colonoscopy - Pt aware overdue  Pt declined flu shot.      Health Maintenance Due   Topic Date Due    Pneumococcal 0-64 years (1 of 1 - PPSV23) [birth year]/1965    EYE EXAM RETINAL OR DILATED  08/23/1969    COLONOSCOPY  08/23/1977    Influenza Age 5 to Adult  08/01/2019    HEMOGLOBIN A1C Q6M  10/23/2019    FOOT EXAM Q1  11/06/2019    MICROALBUMIN Q1  11/06/2019

## 2019-11-05 NOTE — PATIENT INSTRUCTIONS
Numbness and Tingling: Care Instructions Your Care Instructions Many things can cause numbness or tingling. Swelling may put pressure on a nerve. This could cause you to lose feeling or have a pins-and-needles sensation on part of your body. Nerves may be damaged from trauma, toxins, or diseases, such as diabetes or multiple sclerosis (MS). Sometimes, though, the cause is not clear. If there is no clear reason for your symptoms, and you are not having any other symptoms, your doctor may suggest watching and waiting for a while to see if the numbness or tingling goes away on its own. Your doctor may want you to have blood or nerve tests to find the cause of your symptoms. Follow-up care is a key part of your treatment and safety. Be sure to make and go to all appointments, and call your doctor if you are having problems. It's also a good idea to know your test results and keep a list of the medicines you take. How can you care for yourself at home? · If your doctor prescribes medicine, take it exactly as directed. Call your doctor if you think you are having a problem with your medicine. · If you have any swelling, put ice or a cold pack on the area for 10 to 20 minutes at a time. Put a thin cloth between the ice and your skin. When should you call for help? Call 911 anytime you think you may need emergency care. For example, call if: 
  · You have weakness, numbness, or tingling in both legs.  
  · You lose bowel or bladder control.  
  · You have symptoms of a stroke. These may include: 
? Sudden numbness, tingling, weakness, or loss of movement in your face, arm, or leg, especially on only one side of your body. ? Sudden vision changes. ? Sudden trouble speaking. ? Sudden confusion or trouble understanding simple statements. ? Sudden problems with walking or balance. ? A sudden, severe headache that is different from past headaches.  Watch closely for changes in your health, and be sure to contact your doctor if you have any problems, or if: 
  · You do not get better as expected. Where can you learn more? Go to http://birgit-rashaun.info/. Enter N517 in the search box to learn more about \"Numbness and Tingling: Care Instructions. \" Current as of: March 28, 2019 Content Version: 12.2 © 3896-4242 [employer], [employer]. Care instructions adapted under license by [employer] (which disclaims liability or warranty for this information). If you have questions about a medical condition or this instruction, always ask your healthcare professional. [address] any warranty or liability for your use of this information.

## 2019-11-07 ENCOUNTER — TELEPHONE (OUTPATIENT)
Dept: FAMILY MEDICINE CLINIC | Age: 59
End: 2019-11-07

## 2019-11-07 LAB
ALBUMIN/CREAT UR: <1.5 MG/G CREAT (ref 0–30)
CHOLEST SERPL-MCNC: 226 MG/DL (ref 100–199)
CREAT UR-MCNC: 204.7 MG/DL
FOLATE SERPL-MCNC: 12.7 NG/ML
HDLC SERPL-MCNC: 64 MG/DL
INTERPRETATION, 910389: NORMAL
LDLC SERPL CALC-MCNC: 146 MG/DL (ref 0–99)
Lab: NORMAL
MICROALBUMIN UR-MCNC: <3 UG/ML
TRIGL SERPL-MCNC: 82 MG/DL (ref 0–149)
TSH SERPL DL<=0.005 MIU/L-ACNC: 2.08 UIU/ML (ref 0.45–4.5)
VIT B12 SERPL-MCNC: 471 PG/ML (ref 232–1245)
VLDLC SERPL CALC-MCNC: 16 MG/DL (ref 5–40)

## 2019-11-07 NOTE — PROGRESS NOTES
RECOMMENDATIONS:  Diabetes is under good control! Thyroid lab normal.  Vitamin B12 normal.      Cholesterol numbers are elevated. According to the American Heart Association and the [employer] of Cardiology, your 10-year risk of a heart attack or stroke is 26.3%. Adults 36to 76years of age with LDL 70 to 189 mg/dL with estimated 10-year risk ?7.5% or a stroke or heart attack should be treated with cholesterol medication. I would like for you to restart taking atorvastatin to reduce youor risk of stroke and heart attack. I have enclosed information about diet and lifestyle to help lower cholesterol - try to limit the amount of fried foods, fatty foods, butter, gravy, red meat, ice cream, cheese, and eggs in your diet, which are all high in cholesterol. Plan to recheck in 6 months. Make sure you are fasting (no food, only water) for 8-12 hours before your appointment.

## 2019-11-07 NOTE — TELEPHONE ENCOUNTER
----- Message from Bhargavi Cleveland sent at 11/7/2019  1:31 PM EST -----  Regarding: Santhosh NP/Telephone   General Message/Vendor Calls    Caller's first and last name:      Reason for call:Pt is asking for the PCP or nurse to give him a call about the color guard he received in the mail.       Callback required yes/no and why:Y      Best contact number(s): [phone]      Details to clarify the request:      Bhargavi Cleveland

## 2019-11-07 NOTE — TELEPHONE ENCOUNTER
See if he is willing to restart atorvastatin with his risk of CVA and heart attack. RECOMMENDATIONS:  Diabetes is under good control!     Thyroid lab normal.  Vitamin B12 normal.       Cholesterol numbers are elevated. According to the American Heart Association and the [employer] of Cardiology, your 10-year risk of a heart attack or stroke is 26.3%. Adults 36to 76years of age with LDL 70 to 189 mg/dL with estimated 10-year risk ?7.5% or a stroke or heart attack should be treated with cholesterol medication. I would like for you to restart taking atorvastatin to reduce youor risk of stroke and heart attack. I have enclosed information about diet and lifestyle to help lower cholesterol - try to limit the amount of fried foods, fatty foods, butter, gravy, red meat, ice cream, cheese, and eggs in your diet, which are all high in cholesterol. Plan to recheck in 6 months. Make sure you are fasting (no food, only water) for 8-12 hours before your appointment.

## 2019-11-07 NOTE — TELEPHONE ENCOUNTER
Verified patient with two type of identifiers. Pt states he was concerned about the uncertainty of the cologuard test. Informed pt that yes there may be false pos/neg but it is less likely with this test but the colonoscopy is the best option yes. Pt states will think about it and may do the cologuard but also have colonoscopy, and call back.

## 2019-11-08 NOTE — TELEPHONE ENCOUNTER
Verified patient with two type of identifiers. Informed pt of lab results. Pt verbalized understanding. Pt made aware of risk level for increased cholesterol levels. Pt states is not willing to start medication but will watch what he eats and restart fish oil supplement and schedule lab only in 3 months to re check lipids.

## 2019-11-25 RX ORDER — METOPROLOL TARTRATE 50 MG/1
TABLET ORAL
Qty: 60 TAB | Refills: 0 | Status: SHIPPED | OUTPATIENT
Start: 2019-11-25 | End: 2019-12-23

## 2019-12-23 RX ORDER — METOPROLOL TARTRATE 50 MG/1
TABLET ORAL
Qty: 60 TAB | Refills: 0 | Status: SHIPPED | OUTPATIENT
Start: 2019-12-23 | End: 2020-01-22

## 2020-01-22 RX ORDER — METOPROLOL TARTRATE 50 MG/1
TABLET ORAL
Qty: 60 TAB | Refills: 0 | Status: SHIPPED | OUTPATIENT
Start: 2020-01-22 | End: 2020-02-18

## 2020-02-10 ENCOUNTER — TELEPHONE (OUTPATIENT)
Dept: FAMILY MEDICINE CLINIC | Age: 60
End: 2020-02-10

## 2020-02-10 NOTE — TELEPHONE ENCOUNTER
----- Message from Jigar Saleh sent at 2/10/2020  8:02 AM EST -----  Regarding: Ron Trevizo/ Telephone  Contact: [phone]  Patient would like to know if it's okay for him to take elaine seltzer plus with the current medications he is on. Please follow up with him in reference to this.

## 2020-02-13 ENCOUNTER — TELEPHONE (OUTPATIENT)
Dept: FAMILY MEDICINE CLINIC | Age: 60
End: 2020-02-13

## 2020-02-13 NOTE — TELEPHONE ENCOUNTER
Patient would like a call from [facility] regarding taking Fadia Abt plus with his medication for congestion he wants to know what can he take his call back number is

## 2020-02-13 NOTE — TELEPHONE ENCOUNTER
Verified patient with two type of identifiers. Informed pt per Anika Medrano NP he can take medications. Pt verbalized understanding.

## 2020-02-18 RX ORDER — METOPROLOL TARTRATE 50 MG/1
TABLET ORAL
Qty: 60 TAB | Refills: 0 | Status: SHIPPED | OUTPATIENT
Start: 2020-02-18 | End: 2020-03-18

## 2020-02-19 NOTE — PROGRESS NOTES
Chief Complaint   Patient presents with    Fatigue     Pt states x 2 weeks felt sluggish after finishing Eliquis. 1. Have you been to the ER, urgent care clinic since your last visit? Hospitalized since your last visit? No    2. Have you seen or consulted any other health care providers outside of the [address] [place] [address] since your last visit? Include any pap smears or colon screening.  No    Health Maintenance Due   Topic Date Due    Pneumococcal 0-64 years (1 of 1 - PPSV23) 08/23/1965    EYE EXAM RETINAL OR DILATED  08/23/1969    COLONOSCOPY  08/23/1977 Patient informed of abnormal pap which shows ASCUS with + other HPV  She is aware that she will need further testing with a procedure called a colposcopy  Patient transferred to front staff for scheduling

## 2020-03-18 RX ORDER — METOPROLOL TARTRATE 50 MG/1
TABLET ORAL
Qty: 60 TAB | Refills: 0 | Status: SHIPPED | OUTPATIENT
Start: 2020-03-18 | End: 2020-04-15

## 2020-04-15 RX ORDER — METOPROLOL TARTRATE 50 MG/1
TABLET ORAL
Qty: 60 TAB | Refills: 0 | Status: SHIPPED | OUTPATIENT
Start: 2020-04-15 | End: 2020-05-14

## 2020-04-29 ENCOUNTER — VIRTUAL VISIT (OUTPATIENT)
Dept: FAMILY MEDICINE CLINIC | Age: 60
End: 2020-04-29

## 2020-04-29 DIAGNOSIS — Z86.711 HISTORY OF PULMONARY EMBOLISM: ICD-10-CM

## 2020-04-29 DIAGNOSIS — I10 ESSENTIAL HYPERTENSION WITH GOAL BLOOD PRESSURE LESS THAN 130/80: Primary | ICD-10-CM

## 2020-04-29 DIAGNOSIS — E66.01 SEVERE OBESITY (BMI 35.0-35.9 WITH COMORBIDITY) (HCC): ICD-10-CM

## 2020-04-29 DIAGNOSIS — E11.9 CONTROLLED TYPE 2 DIABETES MELLITUS WITHOUT COMPLICATION, WITHOUT LONG-TERM CURRENT USE OF INSULIN (HCC): ICD-10-CM

## 2020-04-29 DIAGNOSIS — R07.89 CHEST DISCOMFORT: ICD-10-CM
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
## 2020-04-29 NOTE — PROGRESS NOTES
Chiquita Waller is a 60 y.o. male evaluated via telephone on 4/29/2020. Consent:  He and/or health care decision maker is aware that he may receive a bill for this telephone service, depending on his insurance coverage, and has provided verbal consent to proceed: Yes      Documentation:  I communicated with the patient and/or health care decision maker about follow up diabetes  . Details of this discussion including any medical advice provided:      Last week BP: 125/78. Pt does not check BG at home. Last weight: 274lb. Pt concerned that he has not been able to lose weight, even with lifestyle changes. he feels like he eats healthy. He does not exercise like he used to. Would like to lose weight. Eats mostly fish, chicken and turkey.  Minimal pork. Patient has been having pain in left temple area of head and discomfort in chest and left arm. States did not last and resolved. When he exerts himself, symptoms seem worse. Last week, he was sawing some wood and did not feel well afterwards. Has not had stress test done in past.  Has seen cardiology     Not taking atorvastatin. Patient to work on diet and exercise. Does not eat beef and cheese. Will eat sausage 2-3 times weekly on [address]. Going towards a more plant based diet. He usually bowls every week, but since COVID-19, that has stopped    1. Essential hypertension with goal blood pressure less than 130/80  Controlled. Continue metoprolol    2. Controlled type 2 diabetes mellitus without complication, without long-term current use of insulin (HCC)  Last A1c 6% in Nov 2019, diet controlled. Continue to work on diet and exercise    3. Severe obesity (BMI 35.0-35.9 with comorbidity) ([facility] [address]  - Provided nutritional and dietary counseling. Encouraged patient to drink water, avoid Liquid calories.  Encouraged patient to make healthy dietary changes, encouraged patient to increase fruits/veggies, avoidance of fast foods, fried foods.  Encouraged patient to follow low carbohydrate diet,         - Discussed behavior modification. Discussed with patient to make small, achievable changes for better success with weight loss.  Aim for 1-2 pounds per week weight loss        - Encouraged patient to increase exercise.    -Patient to make small changes in diet and behavior, and to increase exercise. -     Patient to use [employer] tori    4. History of pulmonary embolism  Followed by oncology. Taking ASA daily    5. Chest discomfort  Patient to follow up with cardiology. Similar to pain he had in Oct 2019. EKG normal at that time. May need stress testing - to discuss with cardiology at upcoming appt. I affirm this is a Patient Initiated Episode with an Established Patient who has not had a related appointment within my department in the past 7 days or scheduled within the next 24 hours.     Total Time: minutes: 11-20 minutes    Note: not billable if this call serves to triage the patient into an appointment for the relevant concern      Leroy Zambrano NP

## 2020-05-14 RX ORDER — METOPROLOL TARTRATE 50 MG/1
TABLET ORAL
Qty: 60 TAB | Refills: 0 | Status: SHIPPED | OUTPATIENT
Start: 2020-05-14 | End: 2020-06-10

## 2020-05-29 ENCOUNTER — TELEPHONE (OUTPATIENT)
Dept: FAMILY MEDICINE CLINIC | Age: 60
End: 2020-05-29

## 2020-05-29 NOTE — TELEPHONE ENCOUNTER
Verified patient with two type of identifiers. Discussed pt's discomfort and pt states it is the same since discussing with provider on 4/29/20. Inquired if pt had contacted cardiology per Almita Putnam NP request for a stress test and pt stated he did not know that was what she wanted him to do so will contact cardiology today.

## 2020-05-29 NOTE — TELEPHONE ENCOUNTER
Best number to reach him is [phone]    He has headache and temple protrusion x a few months (has even had a CT scan)     Pls call to advise

## 2020-05-29 NOTE — TELEPHONE ENCOUNTER
Contacted Cardiology and scheduled pt appt with Dr. Zuly Eldridge, 6/30/20 at 10 AM.     Verified patient with two type of identifiers. Informed pt scheduled appt because he needed to just schedule an appt to see if a stress test would benefit him Pt verbalized understanding.

## 2020-05-29 NOTE — TELEPHONE ENCOUNTER
Pt states Cardiologist needs an order sent     Dr Izaiah Campos Cardiology   [phone] (O)       Best number to reach him is [phone]

## 2020-06-10 RX ORDER — METOPROLOL TARTRATE 50 MG/1
TABLET ORAL
Qty: 60 TAB | Refills: 0 | Status: SHIPPED | OUTPATIENT
Start: 2020-06-10 | End: 2020-07-09

## 2020-06-30 ENCOUNTER — OFFICE VISIT (OUTPATIENT)
Dept: CARDIOLOGY CLINIC | Age: 60
End: 2020-06-30

## 2020-06-30 VITALS
HEIGHT: 72 IN | SYSTOLIC BLOOD PRESSURE: 136 MMHG | DIASTOLIC BLOOD PRESSURE: 82 MMHG | OXYGEN SATURATION: 95 % | RESPIRATION RATE: 16 BRPM | HEART RATE: 66 BPM | BODY MASS INDEX: 37.21 KG/M2 | WEIGHT: 274.7 LBS

## 2020-06-30 DIAGNOSIS — I48.92 ATRIAL FLUTTER, UNSPECIFIED TYPE (HCC): ICD-10-CM

## 2020-06-30 DIAGNOSIS — I26.92 SADDLE EMBOLUS OF PULMONARY ARTERY WITHOUT ACUTE COR PULMONALE, UNSPECIFIED CHRONICITY (HCC): ICD-10-CM

## 2020-06-30 DIAGNOSIS — E78.2 MIXED HYPERLIPIDEMIA: ICD-10-CM

## 2020-06-30 DIAGNOSIS — E66.01 SEVERE OBESITY (BMI 35.0-39.9) WITH COMORBIDITY (HCC): ICD-10-CM

## 2020-06-30 DIAGNOSIS — I10 ESSENTIAL HYPERTENSION: Primary | ICD-10-CM

## 2020-06-30 NOTE — PROGRESS NOTES
[address] [place] [address], [address] [facility]  [phone]     Subjective:      Chiquita Waller is a 60 y.o. male is here for routine f/u. He has a PMHx of PAF, saddle PE (unprovoked), HTN, HLD,  Diet controlled  DM and obesity. Last seen by us in 4/19. He has started  Walking since December 2019, about 4-5 times a week for about 45 mins to 1.5 hr with no symptoms. The patient denies chest pain/ shortness of breath, orthopnea, PND, LE edema, palpitations, syncope, or presyncope. Patient Active Problem List    Diagnosis Date Noted    Severe obesity (BMI 35.0-39. 9) with comorbidity ([address] 05/01/2018    Mixed hyperlipidemia 03/26/2018    Controlled type 2 diabetes mellitus without complication, without long-term current use of insulin ([address] 01/29/2018    Vitamin D deficiency 01/29/2018    Atrial flutter ([address] 01/18/2018    Pulmonary embolism ([address] 01/16/2018    Hyperlipidemia with target LDL less than 100 11/26/2013    URI (upper respiratory infection) 10/14/2013    Essential hypertension 10/14/2013    Cerumen impaction 10/14/2013    Obesity, unspecified 10/10/2013      Patricio Gallego NP  Past Medical History:   Diagnosis Date    Atrial flutter ([address] 1/18/2018    Atrial flutter ([address] 1/18/2018    Cerumen impaction 10/14/2013    Controlled type 2 diabetes mellitus without complication, without long-term current use of insulin ([address]     Controlled type 2 diabetes mellitus without complication, without long-term current use of insulin ([address] 1/29/2018    Elevated blood pressure 10/14/2013    Essential hypertension     Hyperlipidemia with target LDL less than 100 11/26/2013    Obesity, unspecified 10/10/2013    Pulmonary emboli ([address] 01/19/2018    [id number] -    Pulmonary embolism ([address] 1/16/2018    Vitamin D deficiency 1/29/2018      No past surgical history on file.   Allergies   Allergen Reactions    Ibuprofen Other (comments)     Increase B/P      Family History Problem Relation Age of Onset    Hypertension Mother     Hypertension Father     Kidney Disease Father [de-identified]        on dialysis      Social History     Socioeconomic History    Marital status:      Spouse name: Not on file    Number of children: Not on file    Years of education: Not on file    Highest education level: Not on file   Occupational History    Not on file   Social Needs    Financial resource strain: Not on file    Food insecurity     Worry: Not on file     Inability: Not on file    Transportation needs     Medical: Not on file     Non-medical: Not on file   Tobacco Use    Smoking status: Never Smoker    Smokeless tobacco: Never Used   Substance and Sexual Activity    Alcohol use: No    Drug use: No    Sexual activity: Yes     Partners: Female   Lifestyle    Physical activity     Days per week: Not on file     Minutes per session: Not on file    Stress: Not on file   Relationships    Social connections     Talks on phone: Not on file     Gets together: Not on file     Attends Anabaptism service: Not on file     Active member of club or organization: Not on file     Attends meetings of clubs or organizations: Not on file     Relationship status: Not on file    Intimate partner violence     Fear of current or ex partner: Not on file     Emotionally abused: Not on file     Physically abused: Not on file     Forced sexual activity: Not on file   Other Topics Concern     Service Not Asked    Blood Transfusions Not Asked    Caffeine Concern Not Asked    Occupational Exposure Not Asked   Seth Graver Hazards Not Asked    Sleep Concern Not Asked    Stress Concern Not Asked    Weight Concern Not Asked    Special Diet Not Asked    Back Care Not Asked    Exercise Not Asked    Bike Helmet Not Asked   [address] Not Asked    Self-Exams Not Asked   Social History Narrative     to [employer].   Retired from [employer]      Current Outpatient Medications   Medication Sig    metoprolol tartrate (LOPRESSOR) 50 mg tablet TAKE 1 TABLET BY MOUTH EVERY 12 HOURS FOR 30 DAYS.  fish oil-omega-3 fatty acids (Fish Oil) 340-1,000 mg capsule Take 1 Cap by mouth daily.  aspirin (ASPIRIN) 325 mg tablet Take 325 mg by mouth daily.  cholecalciferol (VITAMIN D3) 1,000 unit cap Take  by mouth daily. No current facility-administered medications for this visit. Review of Symptoms:  11 systems reviewed, negative other than as stated in the HPI    Physical ExamPhysical Exam:    Vitals:    06/30/20 1045 06/30/20 1100   BP: 140/90 136/82   Pulse: 66    Resp: 16    SpO2: 95%    Weight: 274 lb 11.2 oz (124.6 kg)    Height: 6' (1.829 m)      Body mass index is 37.26 kg/m². General PE  Gen:  NAD  Mental Status - Alert. General Appearance - Not in acute distress. HEENT:  PERRL, no carotid bruits or JVD  Chest and Lung Exam   Inspection: Accessory muscles - No use of accessory muscles in breathing. Auscultation:   Breath sounds: - Normal.   Cardiovascular   Inspection: Jugular vein - Bilateral - Inspection Normal.   Palpation/Percussion:   Apical Impulse: - Normal.   Auscultation: Rhythm - Regular. Heart Sounds - S1 WNL and S2 WNL. No S3 or S4. Murmurs & Other Heart Sounds: Auscultation of the heart reveals - No Murmurs. Peripheral Vascular   Upper Extremity: Inspection - Bilateral - No Cyanotic nailbeds or Digital clubbing. Lower Extremity:   Palpation: Edema - Bilateral - No edema. Abdomen:   Soft, non-tender, bowel sounds are active.   Neuro: A&O times 3, CN and motor grossly WNL    Labs:   Lab Results   Component Value Date/Time    Cholesterol, total 226 (H) 11/05/2019 08:30 AM    Cholesterol, total 228 (H) 04/23/2019 08:24 AM    Cholesterol, total 219 (H) 11/06/2018 08:49 AM    Cholesterol, total 200 (H) 01/18/2018 04:40 AM    Cholesterol, Total 247 (H) 11/18/2013 08:31 AM    HDL Cholesterol 64 11/05/2019 08:30 AM    HDL Cholesterol 60 04/23/2019 08:24 AM    HDL Cholesterol 61 11/06/2018 08:49 AM    HDL Cholesterol 54 01/18/2018 04:40 AM    LDL, calculated 146 (H) 11/05/2019 08:30 AM    LDL, calculated 148 (H) 04/23/2019 08:24 AM    LDL, calculated 138 (H) 11/06/2018 08:49 AM    LDL, calculated 125.4 (H) 01/18/2018 04:40 AM    Triglyceride 82 11/05/2019 08:30 AM    Triglyceride 100 04/23/2019 08:24 AM    Triglyceride 99 11/06/2018 08:49 AM    Triglyceride 103 01/18/2018 04:40 AM    CHOL/HDL Ratio 3.7 01/18/2018 04:40 AM     No results found for: CPK, CPKX, CPX  Lab Results   Component Value Date/Time    Sodium 138 10/31/2019 04:52 AM    Potassium 3.7 10/31/2019 04:52 AM    Chloride 106 10/31/2019 04:52 AM    CO2 25 10/31/2019 04:52 AM    Anion gap 7 10/31/2019 04:52 AM    Glucose 128 (H) 10/31/2019 04:52 AM    BUN 16 10/31/2019 04:52 AM    Creatinine 1.18 10/31/2019 04:52 AM    BUN/Creatinine ratio 14 10/31/2019 04:52 AM    GFR est AA >60 10/31/2019 04:52 AM    GFR est non-AA >60 10/31/2019 04:52 AM    Calcium 9.2 10/31/2019 04:52 AM    Bilirubin, total 0.5 10/31/2019 04:52 AM    Alk. phosphatase 90 10/31/2019 04:52 AM    Protein, total 6.9 10/31/2019 04:52 AM    Albumin 3.4 (L) 10/31/2019 04:52 AM    Globulin 3.5 10/31/2019 04:52 AM    A-G Ratio 1.0 (L) 10/31/2019 04:52 AM    ALT (SGPT) 24 10/31/2019 04:52 AM       EKG:  SB     Assessment:     Assessment:      1. Essential hypertension    2. Mixed hyperlipidemia    3. Atrial flutter, unspecified type ([facility] [address]    4. Saddle embolus of pulmonary artery without acute cor pulmonale, unspecified chronicity ([facility] [address]    5. Severe obesity (BMI 35.0-39. 9) with comorbidity ([facility] [address]        Orders Placed This Encounter    AMB POC EKG ROUTINE W/ 12 LEADS, INTER & REP     Order Specific Question:   Reason for Exam:     Answer:   routine        Plan:     Patient presents for f/u, doing well and stable from cardiac standpoint. Last seen by us in 4/19.   He has started  Walking since December 2019, about 4-5 times a week for about 45 mins to 1.5 hr with no symptoms. Paroxysmal atrial fib/flutter in setting of PE:    s/p MIQUEL/DCCV 2/18  Echo in 1/2018 with preserved LVEF 55-60%  Maintaining sinus rhythm. With CHADVASc score does not need oral AC for a. Fib. HTN  Controlled with BB    HLD  11/19 LDL at 146. Lipids and labs followed by PCP. Working with dietary changes, exercise  States he will see his PCP in September for repeat labs    Diet controlled DM    Saddle embolus of pulmonary artery without acute cor pulmonale  With unprovoked saddle embolus diagnosed early last year 2018. Followed by Dr Valentino Jews, last seen 5/19: We shall discontinue Apixaban and switch to an ASA daily. ASA has been shown to reduce the risk of recurrence of PE/DVT by 40%.    Discussed importance of diet and exercise - eventual goal of 30 - 60 minutes, 5-7 times a week as per AHA guideline. Goal of 10 % (25 lbs) weight loss for 6 months. Continue current care and f/u in 1 yr or sooner if needed    Link KRISS Lizarraga       Patient seen and examined by me with nurse practitioner. I personally performed all components of the history, physical, and medical decision making and agree with the assessment and plan as noted. Good functional capacity. Option of coronary calcium score d/w pt and offered if he is interested. He will call back. No other tests needed for now. Continue current meds.      Max Desouza MD

## 2020-06-30 NOTE — PROGRESS NOTES
Chief Complaint   Patient presents with    Follow-up    Irregular Heart Beat     1. Have you been to the ER, urgent care clinic since your last visit? Hospitalized since your last visit? Yes 10/2019    2. Have you seen or consulted any other health care providers outside of the [address] [place] [address] since your last visit? Include any pap smears or colon screening. Dentist    Patient sad with recent loss of mother.

## 2020-07-09 RX ORDER — METOPROLOL TARTRATE 50 MG/1
TABLET ORAL
Qty: 60 TAB | Refills: 0 | Status: SHIPPED | OUTPATIENT
Start: 2020-07-09 | End: 2020-08-06

## 2020-08-06 RX ORDER — METOPROLOL TARTRATE 50 MG/1
TABLET ORAL
Qty: 60 TAB | Refills: 0 | Status: SHIPPED | OUTPATIENT
Start: 2020-08-06 | End: 2020-09-08

## 2020-09-08 RX ORDER — METOPROLOL TARTRATE 50 MG/1
TABLET ORAL
Qty: 60 TAB | Refills: 0 | Status: SHIPPED | OUTPATIENT
Start: 2020-09-08 | End: 2020-09-09

## 2020-09-09 ENCOUNTER — TELEPHONE (OUTPATIENT)
Dept: FAMILY MEDICINE CLINIC | Age: 60
End: 2020-09-09

## 2020-09-09 RX ORDER — METOPROLOL TARTRATE 50 MG/1
TABLET ORAL
Qty: 60 TAB | Refills: 0
Start: 2020-09-09 | End: 2020-09-30 | Stop reason: SDUPTHER

## 2020-09-09 NOTE — TELEPHONE ENCOUNTER
Patient phoned verified with two identifiers. Patient stated brisk walking five days a week and for the last 2-3 days for the last 2-3 days heart rate in the 40's. Concerned if Metoprolol should be adjusted. Asked patient who was prescribing provider patient stated Cardiologist.  Informed patient to contact Cardiologist for medication adjustment informed will send message to provider voiced understanding.

## 2020-09-09 NOTE — TELEPHONE ENCOUNTER
----- Message from Xin Varela sent at 9/9/2020  3:32 PM EDT -----  Regarding: NP [place]/Telephone  Caller's first and last name: pt  Reason for call: pt would like to discuss heart rate   Callback required yes/no and why: yes  Best contact number(s): [phone]   Details to clarify the request: Pt has been taking \"metoprolol\". Pt is wondering if he should continue taking it or cut back to once a day. He mentioned that he has been exercising more. Pt is concerned.

## 2020-09-09 NOTE — TELEPHONE ENCOUNTER
Patient can contact cardiologist.,    Patient can also decrease metoprolol,  take 1/2 tablet of metoprolol BID (instead of 1 tablet). Monitor HR.   If HR does not improve and go above 55-60, he should schedule appt with cardiologist.  Orders Placed This Encounter    metoprolol tartrate (LOPRESSOR) 50 mg tablet     Sig: TAKE 1/2 TABLET BY MOUTH EVERY 12 HOURS     Dispense:  60 Tab     Refill:  0

## 2020-09-10 ENCOUNTER — TELEPHONE (OUTPATIENT)
Dept: CARDIOLOGY CLINIC | Age: 60
End: 2020-09-10

## 2020-09-10 NOTE — TELEPHONE ENCOUNTER
Please call patient regarding resting heart rate being low wants to know does he need to adjust his medication      Metoprolol tartrate 50mg take 2xs a day.     [phone]    Thanks  Breonna Avila

## 2020-09-10 NOTE — TELEPHONE ENCOUNTER
Pt returned call,verified pt with two pt identifiers, pt advised that he has been more physically active. He is walking 5 days a week about an hour or longer. No cardiac symptoms. He has noticed his resting HR has been in the 40's the past 2 days. He has noticed when taking his Metoprolol 50 mg tab bid, every 12 hours he is slowing down. The past 2 weeks his resting HR has been in the 50's. He is wondering if he could go down on his Metoprolol to see if it would help with his slowness and low HR. His BP is around 120's over 80's. Advised I would send to  to see he can decrease or needs to stay the same. Pt verbalized understanding.

## 2020-09-11 ENCOUNTER — TELEPHONE (OUTPATIENT)
Dept: FAMILY MEDICINE CLINIC | Age: 60
End: 2020-09-11

## 2020-09-11 NOTE — TELEPHONE ENCOUNTER
Verified patient with two type of identifiers. Pt states had not received a call back from cardiology. Informed pt of instruction per Sushma Meza NP and he is to follow up back up with cardiology. Pt verbalized understanding.

## 2020-09-11 NOTE — TELEPHONE ENCOUNTER
----- Message from Kira Ulloa sent at 9/11/2020 10:23 AM EDT -----  Regarding: NP [place] / Telephone  Caller's first and last name and relationship (if not the patient): self  Best contact number(s): [phone]  Whose call is being returned: [place]  Details to clarify the request: Pt returning call from [place] and believes it's related to his prescriptions.

## 2020-09-11 NOTE — TELEPHONE ENCOUNTER
Salina Borrego, KRISS           4:29 PM   Note      Patient can contact cardiologist.,     Patient can also decrease metoprolol,  take 1/2 tablet of metoprolol BID (instead of 1 tablet). Monitor HR.   If HR does not improve and go above 55-60, he should schedule appt with cardiologist.       Orders Placed This Encounter    metoprolol tartrate (LOPRESSOR) 50 mg tablet       Sig: TAKE 1/2 TABLET BY MOUTH EVERY 12 HOURS       Dispense:  60 Tab       Refill:  0

## 2020-09-14 NOTE — TELEPHONE ENCOUNTER
Message   Received: 3 days ago   Message Contents   MD Francesca Cha, ESTELLAN    Caller: Unspecified (4 days ago, 11:16 AM)               He can lower it to 25 mg bid. Called pt,verified pt with two pt identifiers, advised pt  is advising he can lower his Metoprolol 50 mg to 1/2 tab bid. Advised to cut his tab in half and it will be 25 mg bid every 12 hours. Advised to monitor his BP and HR. Pt advised he spoke to his PCP and they advised the same thing. He started on Friday and has been checking his BP/HR. It was 124/82 HR 61, 57 and on Sunday 124/82 and HR 56, 59. Advised these are all normal. Advised to continue to monitor and if BP elevated or HR over 100 to call us back. Pt verbalized understanding.

## 2020-09-18 ENCOUNTER — TELEPHONE (OUTPATIENT)
Dept: ONCOLOGY | Age: 60
End: 2020-09-18

## 2020-09-18 NOTE — TELEPHONE ENCOUNTER
Attempted to reach patient about 9/25 appt. Patient can be offered vv, or if patient wants to come in the office, will need to be moved to 2:45pm.  No answer.  Left message requesting a return call

## 2020-09-25 ENCOUNTER — TELEPHONE (OUTPATIENT)
Dept: ONCOLOGY | Age: 60
End: 2020-09-25

## 2020-09-25 ENCOUNTER — OFFICE VISIT (OUTPATIENT)
Dept: ONCOLOGY | Age: 60
End: 2020-09-25
Payer: COMMERCIAL

## 2020-09-25 VITALS
TEMPERATURE: 98.1 F | OXYGEN SATURATION: 96 % | SYSTOLIC BLOOD PRESSURE: 136 MMHG | BODY MASS INDEX: 36.79 KG/M2 | HEART RATE: 59 BPM | DIASTOLIC BLOOD PRESSURE: 86 MMHG | WEIGHT: 271.6 LBS | HEIGHT: 72 IN

## 2020-09-25 DIAGNOSIS — Z86.711 HISTORY OF PULMONARY EMBOLISM: Primary | ICD-10-CM

## 2020-09-25 PROCEDURE — 99213 OFFICE O/P EST LOW 20 MIN: CPT | Performed by: INTERNAL MEDICINE

## 2020-09-25 NOTE — PROGRESS NOTES
Rajiv See is a 63 y.o. male here for follow up for:  Chief Complaint   Patient presents with    Follow-up     PE   Pt on Reg ASA    1. Have you been to the ER, urgent care clinic since your last visit? Hospitalized since your last visit? no    2. Have you seen or consulted any other health care providers outside of the [address] [place] [address] since your last visit? Include any pap smears or colon screening. Cardiologist couple months ago    Pt wanting to know if he should move down on dose of Aspirin. States he is doing well.

## 2020-09-25 NOTE — LETTER
10/24/20 Patient: Larry Davidson YOB: 1960 Date of Visit: 9/25/2020 Angela Parks NP 
[address] [place] [address] [phone] VIA In Basket Dear Angela Parks NP, Thank you for referring Mr. Renato Hanson to [address] [place] [address] for evaluation. My notes for this consultation are attached. If you have questions, please do not hesitate to call me. I look forward to following your patient along with you.  
 
 
Sincerely, 
 
Vidya Rm MD

## 2020-09-30 ENCOUNTER — OFFICE VISIT (OUTPATIENT)
Dept: FAMILY MEDICINE CLINIC | Age: 60
End: 2020-09-30
Payer: COMMERCIAL

## 2020-09-30 VITALS
HEART RATE: 63 BPM | BODY MASS INDEX: 36.44 KG/M2 | DIASTOLIC BLOOD PRESSURE: 77 MMHG | OXYGEN SATURATION: 96 % | TEMPERATURE: 97.1 F | SYSTOLIC BLOOD PRESSURE: 133 MMHG | RESPIRATION RATE: 18 BRPM | HEIGHT: 72 IN | WEIGHT: 269 LBS

## 2020-09-30 DIAGNOSIS — E66.01 SEVERE OBESITY (BMI 35.0-35.9 WITH COMORBIDITY) (HCC): ICD-10-CM

## 2020-09-30 DIAGNOSIS — I10 ESSENTIAL HYPERTENSION WITH GOAL BLOOD PRESSURE LESS THAN 130/80: ICD-10-CM

## 2020-09-30 DIAGNOSIS — Z00.00 ROUTINE GENERAL MEDICAL EXAMINATION AT A HEALTH CARE FACILITY: Primary | ICD-10-CM

## 2020-09-30 DIAGNOSIS — E11.9 CONTROLLED TYPE 2 DIABETES MELLITUS WITHOUT COMPLICATION, WITHOUT LONG-TERM CURRENT USE OF INSULIN (HCC): ICD-10-CM

## 2020-09-30 DIAGNOSIS — Z86.711 HISTORY OF PULMONARY EMBOLISM: ICD-10-CM

## 2020-09-30 DIAGNOSIS — I48.92 ATRIAL FLUTTER, UNSPECIFIED TYPE (HCC): ICD-10-CM

## 2020-09-30 DIAGNOSIS — E78.5 HYPERLIPIDEMIA, UNSPECIFIED HYPERLIPIDEMIA TYPE: ICD-10-CM

## 2020-09-30 DIAGNOSIS — Z12.5 PROSTATE CANCER SCREENING: ICD-10-CM

## 2020-09-30 LAB — HBA1C MFR BLD HPLC: 6.1 %

## 2020-09-30 PROCEDURE — 99396 PREV VISIT EST AGE 40-64: CPT | Performed by: NURSE PRACTITIONER

## 2020-09-30 PROCEDURE — 83036 HEMOGLOBIN GLYCOSYLATED A1C: CPT | Performed by: NURSE PRACTITIONER

## 2020-09-30 RX ORDER — METOPROLOL TARTRATE 25 MG/1
TABLET, FILM COATED ORAL
Qty: 180 TAB | Refills: 2 | Status: SHIPPED | OUTPATIENT
Start: 2020-09-30 | End: 2021-05-26 | Stop reason: SDUPTHER

## 2020-09-30 RX ORDER — GUAIFENESIN 100 MG/5ML
81 LIQUID (ML) ORAL DAILY
COMMUNITY

## 2020-09-30 NOTE — PROGRESS NOTES
HISTORY OF PRESENT ILLNESS  Annmarie Valle is a 63 y.o. male. HPI  Patient comes in today for follow up  Last week BP: 125/78. Pt does not check BG at home. Saw oncologist  - no further follow ups needed. Was put on ASA 81mg. Eliquis was stopped. ASA has been shown to reduce the risk of recurrence of PE/DVT by 40%. Has been walking daily. Will walk for 6 miles. States he is feeling better than he has in years. Weight Metrics 9/30/2020 9/25/2020 6/30/2020 11/5/2019 10/31/2019 6/21/2019 5/10/2019   Weight 269 lb 271 lb 9.6 oz 274 lb 11.2 oz 272 lb 3.2 oz 274 lb 4 oz 272 lb 9.6 oz 270 lb   BMI 36.48 kg/m2 36.84 kg/m2 37.26 kg/m2 36.92 kg/m2 37.2 kg/m2 36.97 kg/m2 36.62 kg/m2   HR has been running 59-60s. Monitors BP at home  Eats mostly fish, chicken and turkey.  Minimal pork. Mother passed in June 2020  Not taking atorvastatin.  Patient to work on diet and exercise.  Does not eat beef and cheese.  Will eat sausage 2-3 times weekly on [address] towards a more plant based diet. He usually bowls every week, doing a 2 man league, just started again 3 weeks ago. Wishes to defer colonoscopy at this time. Allergies   Allergen Reactions    Ibuprofen Other (comments)     Increase B/P       Past Medical History:   Diagnosis Date    Atrial flutter ([address] 1/18/2018    Atrial flutter ([address] 1/18/2018    Cerumen impaction 10/14/2013    Controlled type 2 diabetes mellitus without complication, without long-term current use of insulin ([address]     Controlled type 2 diabetes mellitus without complication, without long-term current use of insulin ([address] 1/29/2018    Elevated blood pressure 10/14/2013    Essential hypertension     Hyperlipidemia with target LDL less than 100 11/26/2013    Obesity, unspecified 10/10/2013    Pulmonary emboli ([address] 01/19/2018    [facility] -    Pulmonary embolism ([address] 1/16/2018    Vitamin D deficiency 1/29/2018       History reviewed.  No pertinent surgical history. Social History     Socioeconomic History    Marital status:      Spouse name: Not on file    Number of children: Not on file    Years of education: Not on file    Highest education level: Not on file   Occupational History    Not on file   Social Needs    Financial resource strain: Not on file    Food insecurity     Worry: Not on file     Inability: Not on file    Transportation needs     Medical: Not on file     Non-medical: Not on file   Tobacco Use    Smoking status: Never Smoker    Smokeless tobacco: Never Used   Substance and Sexual Activity    Alcohol use: No    Drug use: No    Sexual activity: Yes     Partners: Female   Lifestyle    Physical activity     Days per week: Not on file     Minutes per session: Not on file    Stress: Not on file   Relationships    Social connections     Talks on phone: Not on file     Gets together: Not on file     Attends Orthodox service: Not on file     Active member of club or organization: Not on file     Attends meetings of clubs or organizations: Not on file     Relationship status: Not on file    Intimate partner violence     Fear of current or ex partner: Not on file     Emotionally abused: Not on file     Physically abused: Not on file     Forced sexual activity: Not on file   Other Topics Concern     Service Not Asked    Blood Transfusions Not Asked    Caffeine Concern Not Asked    Occupational Exposure Not Asked   Sherlean Shayne Hazards Not Asked    Sleep Concern Not Asked    Stress Concern Not Asked    Weight Concern Not Asked    Special Diet Not Asked    Back Care Not Asked    Exercise Not Asked    Bike Helmet Not Asked   [address] Not Asked    Self-Exams Not Asked   Social History Narrative     to [employer].   Retired from [employer]       Family History   Problem Relation Age of Onset    Hypertension Mother     Hypertension Father     Kidney Disease Father [de-identified]        on dialysis       Current Outpatient Medications   Medication Sig    aspirin 81 mg chewable tablet Take 81 mg by mouth daily.  metoprolol tartrate (LOPRESSOR) 50 mg tablet TAKE 1/2 TABLET BY MOUTH EVERY 12 HOURS    fish oil-omega-3 fatty acids (Fish Oil) 340-1,000 mg capsule Take 1 Cap by mouth daily.  cholecalciferol (VITAMIN D3) 1,000 unit cap Take 1,000 Units by mouth daily.  aspirin (ASPIRIN) 325 mg tablet Take 325 mg by mouth daily. No current facility-administered medications for this visit. Review of Systems   Constitutional: Negative for chills, diaphoresis, fever, malaise/fatigue and weight loss. HENT: Negative for congestion, ear pain, sore throat and tinnitus. Eyes: Negative for blurred vision and double vision. Respiratory: Negative for cough, sputum production, shortness of breath and wheezing. Cardiovascular: Negative for chest pain, palpitations and leg swelling. Gastrointestinal: Negative for abdominal pain, blood in stool, constipation, diarrhea, nausea and vomiting. Genitourinary: Negative for dysuria, flank pain, frequency, hematuria and urgency. Musculoskeletal: Negative for back pain, joint pain and myalgias. Skin: Negative. Neurological: Negative for dizziness, tingling, sensory change, speech change, focal weakness and headaches. Psychiatric/Behavioral: Negative for depression. The patient is not nervous/anxious and does not have insomnia. Vitals:    09/30/20 0757   BP: 133/77   Pulse: 63   Resp: 18   Temp: 97.1 °F (36.2 °C)   TempSrc: Temporal   SpO2: 96%   Weight: 269 lb (122 kg)   Height: 6' (1.829 m)     Physical Exam  Vitals signs reviewed. Constitutional:       Appearance: Normal appearance. He is well-developed and well-groomed. Cardiovascular:      Rate and Rhythm: Normal rate and regular rhythm. Pulses:           Dorsalis pedis pulses are 2+ on the right side and 2+ on the left side. Heart sounds: Normal heart sounds.    Pulmonary:      Effort: Pulmonary effort is normal.      Breath sounds: Normal breath sounds. Abdominal:      General: Bowel sounds are normal.      Palpations: Abdomen is soft. Tenderness: There is no abdominal tenderness. Comments: abd obese, exam limited by body habitus   Musculoskeletal:      Right lower leg: No edema. Left lower leg: No edema. Feet:      Right foot:      Protective Sensation: 4 sites tested. 4 sites sensed. Left foot:      Protective Sensation: 4 sites tested. 4 sites sensed. Comments: Diabetic foot exam:   Left: Intact sensation to monofilament 4/4 locations   Position sense intact   2+ DP pulse   No foot deformities  Right: Intact sensation to monofilament 4/4 locations   Position sense intact   2+ DP pulse   No foot deformities  Skin:     General: Skin is warm and dry. Neurological:      Mental Status: He is alert and oriented to person, place, and time. Psychiatric:         Attention and Perception: Attention normal.         Mood and Affect: Mood and affect normal.         Speech: Speech normal.         Behavior: Behavior normal. Behavior is cooperative. Thought Content: Thought content normal.         Judgment: Judgment normal.       ASSESSMENT and PLAN    ICD-10-CM ICD-9-CM    1. Routine general medical examination at a health care facility  Z00.00 V70.0    2. Controlled type 2 diabetes mellitus without complication, without long-term current use of insulin ([facility])  O74.0 864.47 METABOLIC PANEL, COMPREHENSIVE      LIPID PANEL      AMB POC HEMOGLOBIN A1C       DIABETES FOOT EXAM   3. Essential hypertension with goal blood pressure less than 130/80  I10 401.9 metoprolol tartrate (LOPRESSOR) 25 mg tablet      METABOLIC PANEL, COMPREHENSIVE   4. Hyperlipidemia, unspecified hyperlipidemia type  K10.9 482.7 METABOLIC PANEL, COMPREHENSIVE      LIPID PANEL   5. History of pulmonary embolism  Z86.711 V12.55    6.  Severe obesity (BMI 35.0-35.9 with comorbidity) ([facility])  E66.01 278.01 Z68.35 V85.35    7. Atrial flutter, unspecified type ([facility])  I48.92 427.32 CBC WITH AUTOMATED DIFF      METABOLIC PANEL, COMPREHENSIVE   8. Prostate cancer screening  Z12.5 V76.44 PSA W/ REFLX FREE PSA     Encounter Diagnoses   Name Primary?  Routine general medical examination at a health care facility Yes    Controlled type 2 diabetes mellitus without complication, without long-term current use of insulin ([facility])     Essential hypertension with goal blood pressure less than 130/80     Hyperlipidemia, unspecified hyperlipidemia type     History of pulmonary embolism     Severe obesity (BMI 35.0-35.9 with comorbidity) (HCC)     Atrial flutter, unspecified type ([facility]     Prostate cancer screening      Orders Placed This Encounter    CBC WITH AUTOMATED DIFF    METABOLIC PANEL, COMPREHENSIVE    LIPID PANEL    PSA W/ REFLX FREE PSA    AMB POC HEMOGLOBIN A1C    aspirin 81 mg chewable tablet    metoprolol tartrate (LOPRESSOR) 25 mg tablet     Diagnoses and all orders for this visit:    1. Routine general medical examination at a health care facility    2. Controlled type 2 diabetes mellitus without complication, without long-term current use of insulin ([facility] - has been diet controlled. Will check A1c. Patient is walking 6 miles most days of week  -     METABOLIC PANEL, COMPREHENSIVE  -     LIPID PANEL  -     AMB POC HEMOGLOBIN A1C  -      DIABETES FOOT EXAM    3. Essential hypertension with goal blood pressure less than 130/80 - stable  -     metoprolol tartrate (LOPRESSOR) 25 mg tablet; TAKE 1 TABLET BY MOUTH EVERY 12 HOURS  -     METABOLIC PANEL, COMPREHENSIVE    4. Hyperlipidemia, unspecified hyperlipidemia type  -     METABOLIC PANEL, COMPREHENSIVE  -     LIPID PANEL    5. History of pulmonary embolism - taking ASA 81mg  ASA has been shown to reduce the risk of recurrence of PE/DVT by 40%.     6. Severe obesity (BMI 35.0-35.9 with comorbidity) ([facility]  -      I have reviewed/discussed the above normal BMI with the patient. I have recommended the following interventions: dietary management education, guidance, and counseling and encourage exercise . Jefe Jimenez 7. Atrial flutter, unspecified type ([address] - on metoprolol. Taking ASA  -     CBC WITH AUTOMATED DIFF  -     METABOLIC PANEL, COMPREHENSIVE    8. Prostate cancer screening  -     PSA W/ REFLX FREE PSA      Follow-up and Dispositions    · Return in about 6 months (around 3/30/2021). lab results and schedule of future lab studies reviewed with patient  reviewed diet, exercise and weight control    I have reviewed the patient's allergies and made any necessary changes. Medical, procedural, social and family histories have been reviewed and updated as medically indicated. I have reconciled and/or revised patient medications in the EMR. I have discussed each diagnosis listed in this note with Josafat Mendez and/or their family. I have discussed treatment options and the risk/benefit analysis of those options, including safe use of medications and possible medication side effects. Through the use of shared decision making we have agreed to the above plan. The patient has received an after-visit summary and questions were answered concerning future plans. Anjali Trevizo, ELIAZAR-C    This note will not be viewable in [employer].

## 2020-09-30 NOTE — PATIENT INSTRUCTIONS
Eating Healthy Foods: Care Instructions Your Care Instructions Eating healthy foods can help lower your risk for disease. Healthy food gives you energy and keeps your heart strong, your brain active, your muscles working, and your bones strong. A healthy diet includes a variety of foods from the basic food groups: grains, vegetables, fruits, milk and milk products, and meat and beans. Some people may eat more of their favorite foods from only one food group and, as a result, miss getting the nutrients they need. So, it is important to pay attention not only to what you eat but also to what you are missing from your diet. You can eat a healthy, balanced diet by making a few small changes. Follow-up care is a key part of your treatment and safety. Be sure to make and go to all appointments, and call your doctor if you are having problems. It's also a good idea to know your test results and keep a list of the medicines you take. How can you care for yourself at home? Look at what you eat · Keep a food diary for a week or two and record everything you eat or drink. Track the number of servings you eat from each food group. · For a balanced diet every day, eat a variety of: 
? 6 or more ounce-equivalents of grains, such as cereals, breads, crackers, rice, or pasta, every day. An ounce-equivalent is 1 slice of bread, 1 cup of ready-to-eat cereal, or ½ cup of cooked rice, cooked pasta, or cooked cereal. 
? 2½ cups of vegetables, especially: § Dark-green vegetables such as broccoli and spinach. § Orange vegetables such as carrots and sweet potatoes. § Dry beans (such as el and kidney beans) and peas (such as lentils). ? 2 cups of fresh, frozen, or canned fruit. A small apple or 1 banana or orange equals 1 cup. ? 3 cups of nonfat or low-fat milk, yogurt, or other milk products.  
? 5½ ounces of meat and beans, such as chicken, fish, lean meat, beans, nuts, and seeds. One egg, 1 tablespoon of peanut butter, ½ ounce nuts or seeds, or ¼ cup of cooked beans equals 1 ounce of meat. · Learn how to read food labels for serving sizes and ingredients. Fast-food and convenience-food meals often contain few or no fruits or vegetables. Make sure you eat some fruits and vegetables to make the meal more nutritious. · Look at your food diary. For each food group, add up what you have eaten and then divide the total by the number of days. This will give you an idea of how much you are eating from each food group. See if you can find some ways to change your diet to make it more healthy. Start small · Do not try to make dramatic changes to your diet all at once. You might feel that you are missing out on your favorite foods and then be more likely to fail. · Start slowly, and gradually change your habits. Try some of the following: ? Use whole wheat bread instead of white bread. ? Use nonfat or low-fat milk instead of whole milk. ? Eat brown rice instead of white rice, and eat whole wheat pasta instead of white-flour pasta. ? Try low-fat cheeses and low-fat yogurt. ? Add more fruits and vegetables to meals and have them for snacks. ? Add lettuce, tomato, cucumber, and onion to sandwiches. ? Add fruit to yogurt and cereal. 
Enjoy food · You can still eat your favorite foods. You just may need to eat less of them. If your favorite foods are high in fat, salt, and sugar, limit how often you eat them, but do not cut them out entirely. · Eat a wide variety of foods. Make healthy choices when eating out · The type of restaurant you choose can help you make healthy choices. Even fast-food chains are now offering more low-fat or healthier choices on the menu. · Choose smaller portions, or take half of your meal home. · When eating out, try: ? A veggie pizza with a whole wheat crust or grilled chicken (instead of sausage or pepperoni). ? Pasta with roasted vegetables, grilled chicken, or marinara sauce instead of cream sauce. ? A vegetable wrap or grilled chicken wrap. ? Broiled or poached food instead of fried or breaded items. Make healthy choices easy · Buy packaged, prewashed, ready-to-eat fresh vegetables and fruits, such as baby carrots, salad mixes, and chopped or shredded broccoli and cauliflower. · Buy packaged, presliced fruits, such as melon or pineapple. · Choose 100% fruit or vegetable juice instead of soda. Limit juice intake to 4 to 6 oz (½ to ¾ cup) a day. · Blend low-fat yogurt, fruit juice, and canned or frozen fruit to make a smoothie for breakfast or a snack. Where can you learn more? Go to http://birgit-rashaun.info/ Enter T756 in the search box to learn more about \"Eating Healthy Foods: Care Instructions. \" Current as of: August 22, 2019               Content Version: 12.6 © 6325-1183 [employer]. Care instructions adapted under license by [facility] (which disclaims liability or warranty for this information). If you have questions about a medical condition or this instruction, always ask your healthcare professional. [address] any warranty or liability for your use of this information.

## 2020-09-30 NOTE — PROGRESS NOTES
Chief Complaint   Patient presents with    Hypertension     follow up    Diabetes     follow up       Patient declined flu vaccine. 1. Have you been to the ER, urgent care clinic since your last visit? Hospitalized since your last visit? 2. Have you seen or consulted any other health care providers outside of the [address] [place] [address] since your last visit? Include any pap smears or colon screening.  no     Health Maintenance Due:  Colonoscopy due  Eye exam  Shingrix  Dtap

## 2020-10-01 LAB
ALBUMIN SERPL-MCNC: 4.4 G/DL (ref 3.8–4.8)
ALBUMIN/GLOB SERPL: 1.4 {RATIO} (ref 1.2–2.2)
ALP SERPL-CCNC: 92 IU/L (ref 39–117)
ALT SERPL-CCNC: 17 IU/L (ref 0–44)
AST SERPL-CCNC: 17 IU/L (ref 0–40)
BASOPHILS # BLD AUTO: 0 X10E3/UL (ref 0–0.2)
BASOPHILS NFR BLD AUTO: 1 %
BILIRUB SERPL-MCNC: 0.6 MG/DL (ref 0–1.2)
BUN SERPL-MCNC: 12 MG/DL (ref 8–27)
BUN/CREAT SERPL: 11 (ref 10–24)
CALCIUM SERPL-MCNC: 9.6 MG/DL (ref 8.6–10.2)
CHLORIDE SERPL-SCNC: 103 MMOL/L (ref 96–106)
CHOLEST SERPL-MCNC: 208 MG/DL (ref 100–199)
CO2 SERPL-SCNC: 23 MMOL/L (ref 20–29)
CREAT SERPL-MCNC: 1.14 MG/DL (ref 0.76–1.27)
EOSINOPHIL # BLD AUTO: 0.1 X10E3/UL (ref 0–0.4)
EOSINOPHIL NFR BLD AUTO: 2 %
ERYTHROCYTE [DISTWIDTH] IN BLOOD BY AUTOMATED COUNT: 11.8 % (ref 11.6–15.4)
GLOBULIN SER CALC-MCNC: 3.1 G/DL (ref 1.5–4.5)
GLUCOSE SERPL-MCNC: 136 MG/DL (ref 65–99)
HCT VFR BLD AUTO: 46.5 % (ref 37.5–51)
HDLC SERPL-MCNC: 60 MG/DL
HGB BLD-MCNC: 15.3 G/DL (ref 13–17.7)
IMM GRANULOCYTES # BLD AUTO: 0 X10E3/UL (ref 0–0.1)
IMM GRANULOCYTES NFR BLD AUTO: 0 %
INTERPRETATION, 910389: NORMAL
LDLC SERPL CALC-MCNC: 133 MG/DL (ref 0–99)
LYMPHOCYTES # BLD AUTO: 2.3 X10E3/UL (ref 0.7–3.1)
LYMPHOCYTES NFR BLD AUTO: 43 %
Lab: NORMAL
MCH RBC QN AUTO: 29.9 PG (ref 26.6–33)
MCHC RBC AUTO-ENTMCNC: 32.9 G/DL (ref 31.5–35.7)
MCV RBC AUTO: 91 FL (ref 79–97)
MONOCYTES # BLD AUTO: 0.6 X10E3/UL (ref 0.1–0.9)
MONOCYTES NFR BLD AUTO: 10 %
NEUTROPHILS # BLD AUTO: 2.4 X10E3/UL (ref 1.4–7)
NEUTROPHILS NFR BLD AUTO: 44 %
PLATELET # BLD AUTO: 289 X10E3/UL (ref 150–450)
POTASSIUM SERPL-SCNC: 4.7 MMOL/L (ref 3.5–5.2)
PROT SERPL-MCNC: 7.5 G/DL (ref 6–8.5)
PSA SERPL-MCNC: 0.4 NG/ML (ref 0–4)
RBC # BLD AUTO: 5.12 X10E6/UL (ref 4.14–5.8)
REFLEX CRITERIA: NORMAL
SODIUM SERPL-SCNC: 140 MMOL/L (ref 134–144)
TRIGL SERPL-MCNC: 86 MG/DL (ref 0–149)
VLDLC SERPL CALC-MCNC: 15 MG/DL (ref 5–40)
WBC # BLD AUTO: 5.5 X10E3/UL (ref 3.4–10.8)

## 2020-10-02 NOTE — PROGRESS NOTES
Genesis Hicks or Sree Chavira - see if patient willing to take statin    RECOMMENDATIONS:  Hemoglobin A1c 6% - diabetes under good control!!    Liver and kidney function normal.  Blood counts normal (not anemic). Prostate cancer screening negative. Cholesterol numbers are elevated. According to the American Heart Association and the [facility] of Cardiology, your 10-year risk of a heart attack or stroke is 16%. Adults 36to 76years of age with LDL 70 to 189 mg/dL with no diabetes and estimated 10-year risk ?7.5% or a stroke or heart attack should be treated with cholesterol medication. I would like for you to take a statin to help reduce your risk of stroke and heart attack. Why did you stop taking the atorvastatin in the past?  Decrease intake of beef, lamb, pork (sausage, booker), foods with lard/shortening, dairy products with whole milk, saturated oils (coconut and palm oil), fried foods, desserts, sugary drinks, and packaged goods. Increase intake of fiber (oats, tamra seeds, flaxseeds, barley, quinoa), fatty fish (salmon, sardines, trout, mackerel, tuna), lean meats (pork tenderloin, turkey, sirloin steak), nuts (especially almonds and walnuts), seeds, beans, vegetables and dark leafy greens (spinach/kale), skin of fruits, berries, olive oil, avocados/avocado oil, canola oil. Eat leaner meats, low-fat/fat-free dairy products, and yogurts.

## 2020-10-07 ENCOUNTER — TELEPHONE (OUTPATIENT)
Dept: FAMILY MEDICINE CLINIC | Age: 60
End: 2020-10-07

## 2020-10-08 NOTE — TELEPHONE ENCOUNTER
----- Message from Shyam Gross NP sent at 10/1/2020  9:44 PM EDT -----  Micah Holbrook or Lio Blanco - see if patient willing to take statin    RECOMMENDATIONS:  Hemoglobin A1c 6% - diabetes under good control!!    Liver and kidney function normal.  Blood counts normal (not anemic). Prostate cancer screening negative. Cholesterol numbers are elevated. According to the American Heart Association and the [facility] of Cardiology, your 10-year risk of a heart attack or stroke is 16%. Adults 36to 76years of age with LDL 70 to 189 mg/dL with no diabetes and estimated 10-year risk ?7.5% or a stroke or heart attack should be treated with cholesterol medication. I would like for you to take a statin to help reduce your risk of stroke and heart attack. Why did you stop taking the atorvastatin in the past?  Decrease intake of beef, lamb, pork (sausage, booker), foods with lard/shortening, dairy products with whole milk, saturated oils (coconut and palm oil), fried foods, desserts, sugary drinks, and packaged goods. Increase intake of fiber (oats, tamra seeds, flaxseeds, barley, quinoa), fatty fish (salmon, sardines, trout, mackerel, tuna), lean meats (pork tenderloin, turkey, sirloin steak), nuts (especially almonds and walnuts), seeds, beans, vegetables and dark leafy greens (spinach/kale), skin of fruits, berries, olive oil, avocados/avocado oil, canola oil. Eat leaner meats, low-fat/fat-free dairy products, and yogurts.
Verified patient with two type of identifiers. Informed pt of lab results and/or prescription(s). Pt verbalized understanding. Pt states would like to continue to work on chol and re check before starting medications and verbalized he understands the risks.
noted
negative

## 2020-10-24 NOTE — PROGRESS NOTES
[facility]  [facility], [address] [place] [address] Navid Saunders, [address] [place]  [phone]         Hematology Follow Up        Patient: Saeid Strange MRN: [id number]  SSN: [id number]    YOB: 1960  Age: 63 y.o. Sex: male      Subjective:      Saeid Strange is a 63 y.o. male who with a diagnosis of PE. 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 He Apixaban for over a yr. I discontinued Apixaban and is now on ASA. He is doing well. Review of Systems:    Constitutional: negative  Eyes: negative  Ears, Nose, Mouth, Throat, and Face: negative  Respiratory: dyspnea  Cardiovascular: negative  Gastrointestinal: negative  Genitourinary:negative  Integument/Breast: negative  Hematologic/Lymphatic: negative  Musculoskeletal:negative  Neurological: negative          Past Medical History:   Diagnosis Date    Atrial flutter ([address] 1/18/2018    Atrial flutter ([address] 1/18/2018    Cerumen impaction 10/14/2013    Controlled type 2 diabetes mellitus without complication, without long-term current use of insulin ([address]     Controlled type 2 diabetes mellitus without complication, without long-term current use of insulin ([address] 1/29/2018    Elevated blood pressure 10/14/2013    Essential hypertension     Hyperlipidemia with target LDL less than 100 11/26/2013    Obesity, unspecified 10/10/2013    Pulmonary emboli ([address] 01/19/2018    [id number] -    Pulmonary embolism ([address] 1/16/2018    Vitamin D deficiency 1/29/2018     No past surgical history on file.    Family History   Problem Relation Age of Onset    Hypertension Mother     Hypertension Father     Kidney Disease Father [de-identified]        on dialysis     Social History     Tobacco Use    Smoking status: Never Smoker    Smokeless tobacco: Never Used   Substance Use Topics    Alcohol use: No      Prior to Admission medications    Medication Sig Start Date End Date Taking? Authorizing Provider   fish oil-omega-3 fatty acids (Fish Oil) 340-1,000 mg capsule Take 1 Cap by mouth daily. Yes Provider, Historical   cholecalciferol (VITAMIN D3) 1,000 unit cap Take 1,000 Units by mouth daily. Yes Provider, Historical   aspirin 81 mg chewable tablet Take 81 mg by mouth daily. Provider, Historical   metoprolol tartrate (LOPRESSOR) 25 mg tablet TAKE 1 TABLET BY MOUTH EVERY 12 HOURS 9/30/20   Sepideh Trevizo NP              Allergies   Allergen Reactions    Ibuprofen Other (comments)     Increase B/P           Objective:     Vitals:    09/25/20 1525   BP: 136/86   Pulse: (!) 59   Temp: 98.1 °F (36.7 °C)   SpO2: 96%   Weight: 271 lb 9.6 oz (123.2 kg)   Height: 6' (1.829 m)            Physical Exam:    GENERAL: alert, cooperative, no distress, appears stated age  EYE: negative  LYMPHATIC: Cervical, supraclavicular, and axillary nodes normal.   THROAT & NECK: normal and no erythema or exudates noted. LUNG: clear to auscultation bilaterally  HEART: regular rate and rhythm  ABDOMEN: soft, non-tender  EXTREMITIES:  no edema  SKIN: Normal.  NEUROLOGIC: negative            Assessment:     1. Pulmonary embolism:    > 1st episode/event of  PE in 01/2018 - unprovoked    Apixaban took for > 2 years  Now on ASA  Asymptomatic  We shall discontinue Apixaban and switch to an ASA daily. ASA has been shown to reduce the risk of recurrence of PE/DVT by 40%. Plan:       1. Return as needed      Signed by: Leann Smith MD                     October 24, 2020        INDERJIT Burr NP      I was in the office while conducting this encounter. The patient was at his home. Consent:  He and/or his healthcare decision maker is aware that this patient-initiated Telehealth encounter is a billable service, with coverage as determined by his insurance carrier.  He is aware that he may receive a bill and has provided verbal consent to proceed: Yes    Pursuant to the emergency declaration under the [facility], [facility] waiver authority and the [employer] Act, this Virtual  Visit was conducted, with patient's (and/or legal guardian's) consent, to reduce the patient's risk of exposure to COVID-19 and provide necessary medical care. Services were provided through a video synchronous discussion virtually to substitute for in-person visit.

## 2020-12-22 ENCOUNTER — HOSPITAL ENCOUNTER (EMERGENCY)
Age: 60
Discharge: HOME | End: 2020-12-22
Attending: EMERGENCY MEDICINE
Payer: COMMERCIAL

## 2020-12-22 ENCOUNTER — APPOINTMENT (OUTPATIENT)
Dept: CT IMAGING | Age: 60
End: 2020-12-22
Attending: EMERGENCY MEDICINE
Payer: COMMERCIAL

## 2020-12-22 ENCOUNTER — DOCUMENTATION ONLY (OUTPATIENT)
Dept: FAMILY MEDICINE CLINIC | Age: 60
End: 2020-12-22

## 2020-12-22 ENCOUNTER — APPOINTMENT (OUTPATIENT)
Dept: GENERAL RADIOLOGY | Age: 60
End: 2020-12-22
Attending: EMERGENCY MEDICINE
Payer: COMMERCIAL

## 2020-12-22 VITALS
DIASTOLIC BLOOD PRESSURE: 89 MMHG | RESPIRATION RATE: 16 BRPM | OXYGEN SATURATION: 99 % | HEART RATE: 59 BPM | WEIGHT: 265.65 LBS | HEIGHT: 72 IN | TEMPERATURE: 98.1 F | SYSTOLIC BLOOD PRESSURE: 158 MMHG | BODY MASS INDEX: 35.98 KG/M2

## 2020-12-22 DIAGNOSIS — U07.1 COVID-19 VIRUS INFECTION: ICD-10-CM

## 2020-12-22 DIAGNOSIS — J18.9 PNEUMONIA OF BOTH LUNGS DUE TO INFECTIOUS ORGANISM, UNSPECIFIED PART OF LUNG: Primary | ICD-10-CM

## 2020-12-22 LAB
ALBUMIN SERPL-MCNC: 3.8 G/DL (ref 3.5–5)
ALBUMIN/GLOB SERPL: 0.9 {RATIO} (ref 1.1–2.2)
ALP SERPL-CCNC: 97 U/L (ref 45–117)
ALT SERPL-CCNC: 22 U/L (ref 12–78)
ANION GAP SERPL CALC-SCNC: 3 MMOL/L (ref 5–15)
AST SERPL-CCNC: 13 U/L (ref 15–37)
BASOPHILS # BLD: 0 K/UL (ref 0–0.1)
BASOPHILS NFR BLD: 1 % (ref 0–1)
BILIRUB SERPL-MCNC: 0.5 MG/DL (ref 0.2–1)
BUN SERPL-MCNC: 12 MG/DL (ref 6–20)
BUN/CREAT SERPL: 9 (ref 12–20)
CALCIUM SERPL-MCNC: 9.3 MG/DL (ref 8.5–10.1)
CHLORIDE SERPL-SCNC: 104 MMOL/L (ref 97–108)
CO2 SERPL-SCNC: 30 MMOL/L (ref 21–32)
CREAT SERPL-MCNC: 1.27 MG/DL (ref 0.7–1.3)
DIFFERENTIAL METHOD BLD: NORMAL
EOSINOPHIL # BLD: 0.1 K/UL (ref 0–0.4)
EOSINOPHIL NFR BLD: 2 % (ref 0–7)
ERYTHROCYTE [DISTWIDTH] IN BLOOD BY AUTOMATED COUNT: 12.5 % (ref 11.5–14.5)
GLOBULIN SER CALC-MCNC: 4.1 G/DL (ref 2–4)
GLUCOSE SERPL-MCNC: 120 MG/DL (ref 65–100)
HCT VFR BLD AUTO: 45.1 % (ref 36.6–50.3)
HGB BLD-MCNC: 14.3 G/DL (ref 12.1–17)
IMM GRANULOCYTES # BLD AUTO: 0 K/UL (ref 0–0.04)
IMM GRANULOCYTES NFR BLD AUTO: 0 % (ref 0–0.5)
LYMPHOCYTES # BLD: 3.1 K/UL (ref 0.8–3.5)
LYMPHOCYTES NFR BLD: 44 % (ref 12–49)
MCH RBC QN AUTO: 29.2 PG (ref 26–34)
MCHC RBC AUTO-ENTMCNC: 31.7 G/DL (ref 30–36.5)
MCV RBC AUTO: 92.2 FL (ref 80–99)
MONOCYTES # BLD: 0.8 K/UL (ref 0–1)
MONOCYTES NFR BLD: 12 % (ref 5–13)
NEUTS SEG # BLD: 2.8 K/UL (ref 1.8–8)
NEUTS SEG NFR BLD: 41 % (ref 32–75)
NRBC # BLD: 0 K/UL (ref 0–0.01)
NRBC BLD-RTO: 0 PER 100 WBC
PLATELET # BLD AUTO: 306 K/UL (ref 150–400)
PMV BLD AUTO: 9.8 FL (ref 8.9–12.9)
POTASSIUM SERPL-SCNC: 4 MMOL/L (ref 3.5–5.1)
PROT SERPL-MCNC: 7.9 G/DL (ref 6.4–8.2)
RBC # BLD AUTO: 4.89 M/UL (ref 4.1–5.7)
SODIUM SERPL-SCNC: 137 MMOL/L (ref 136–145)
TROPONIN I SERPL-MCNC: <0.05 NG/ML
WBC # BLD AUTO: 6.9 K/UL (ref 4.1–11.1)

## 2020-12-22 PROCEDURE — 36415 COLL VENOUS BLD VENIPUNCTURE: CPT

## 2020-12-22 PROCEDURE — 74011000636 HC RX REV CODE- 636: Performed by: EMERGENCY MEDICINE

## 2020-12-22 PROCEDURE — 99283 EMERGENCY DEPT VISIT LOW MDM: CPT

## 2020-12-22 PROCEDURE — 93005 ELECTROCARDIOGRAM TRACING: CPT

## 2020-12-22 PROCEDURE — 71275 CT ANGIOGRAPHY CHEST: CPT

## 2020-12-22 PROCEDURE — 71045 X-RAY EXAM CHEST 1 VIEW: CPT

## 2020-12-22 PROCEDURE — 85025 COMPLETE CBC W/AUTO DIFF WBC: CPT

## 2020-12-22 PROCEDURE — 84484 ASSAY OF TROPONIN QUANT: CPT

## 2020-12-22 PROCEDURE — 80053 COMPREHEN METABOLIC PANEL: CPT

## 2020-12-22 RX ORDER — AZITHROMYCIN 250 MG/1
TABLET, FILM COATED ORAL
Qty: 6 TAB | Refills: 0 | Status: SHIPPED | OUTPATIENT
Start: 2020-12-22 | End: 2021-01-11 | Stop reason: ALTCHOICE

## 2020-12-22 RX ADMIN — IOPAMIDOL 74 ML: 755 INJECTION, SOLUTION INTRAVENOUS at 18:40

## 2020-12-22 NOTE — ED PROVIDER NOTES
EMERGENCY DEPARTMENT HISTORY AND PHYSICAL EXAM      Date: 12/22/2020  Patient Name: Kenneth Greco  Patient Age and Sex: 63 y.o. male     History of Presenting Illness     Chief Complaint   Patient presents with    Chest Pain     Pain at substernal area for 4 days with back pain reported. Hx of AF; exposure to CV 6 weeks ago (wife positive). some weakness for 4 days       History Provided By: Patient    HPI: Kenneth Greco is a 57-year-old male with a history of PE presenting for chest pain and back pain. Patient states that back in November his wife and presumably him both had Covid 19. At that time he was having a headache and loss of taste and smell. States that is all improved. Then in the last 4 days started to have some sharp back pain as well as chest pain. Has a history of atrial fibrillation and a PE long time ago and used to be on Eliquis but no longer on any blood thinners. States in the last 4 days he has been walking his usual miles and has been more dyspneic on exertion. Denies any cough, fevers. There are no other complaints, changes, or physical findings at this time. PCP: Kathleen Nash NP    No current facility-administered medications on file prior to encounter. Current Outpatient Medications on File Prior to Encounter   Medication Sig Dispense Refill    aspirin 81 mg chewable tablet Take 81 mg by mouth daily.  metoprolol tartrate (LOPRESSOR) 25 mg tablet TAKE 1 TABLET BY MOUTH EVERY 12 HOURS 180 Tab 2    fish oil-omega-3 fatty acids (Fish Oil) 340-1,000 mg capsule Take 1 Cap by mouth daily.  cholecalciferol (VITAMIN D3) 1,000 unit cap Take 1,000 Units by mouth daily.          Past History     Past Medical History:  Past Medical History:   Diagnosis Date    Atrial flutter ([address] 1/18/2018    Atrial flutter ([address] 1/18/2018    Cerumen impaction 10/14/2013    Controlled type 2 diabetes mellitus without complication, without long-term current use of insulin ([facility]     Controlled type 2 diabetes mellitus without complication, without long-term current use of insulin ([facility] 1/29/2018    Elevated blood pressure 10/14/2013    Essential hypertension     Hyperlipidemia with target LDL less than 100 11/26/2013    Obesity, unspecified 10/10/2013    Pulmonary emboli ([facility] 01/19/2018    [id number] -    Pulmonary embolism ([facility] 1/16/2018    Vitamin D deficiency 1/29/2018       Past Surgical History:  No past surgical history on file. Family History:  Family History   Problem Relation Age of Onset    Hypertension Mother     Hypertension Father     Kidney Disease Father [de-identified]        on dialysis       Social History:  Social History     Tobacco Use    Smoking status: Never Smoker    Smokeless tobacco: Never Used   Substance Use Topics    Alcohol use: No    Drug use: No       Allergies: Allergies   Allergen Reactions    Ibuprofen Other (comments)     Increase B/P         Review of Systems   Review of Systems   Constitutional: Negative for chills and fever. Respiratory: Positive for shortness of breath. Negative for cough. Cardiovascular: Positive for chest pain. Gastrointestinal: Negative for abdominal pain, constipation, diarrhea, nausea and vomiting. Genitourinary: Negative for dysuria, frequency and hematuria. Musculoskeletal: Positive for back pain. Neurological: Negative for weakness and numbness. All other systems reviewed and are negative. \    Physical Exam   Physical Exam  Vitals signs and nursing note reviewed. Constitutional:       Appearance: He is well-developed. HENT:      Head: Normocephalic and atraumatic. Nose: Nose normal.      Mouth/Throat:      Mouth: Mucous membranes are moist.   Eyes:      Extraocular Movements: Extraocular movements intact. Conjunctiva/sclera: Conjunctivae normal.   Neck:      Musculoskeletal: Normal range of motion and neck supple.    Cardiovascular:      Rate and Rhythm: Normal rate and regular rhythm. Pulmonary:      Effort: Pulmonary effort is normal. No respiratory distress. Breath sounds: Normal breath sounds. No decreased breath sounds or wheezing. Comments: Patient speaking in full sentences. Not hypoxic on the monitor. Abdominal:      General: There is no distension. Palpations: Abdomen is soft. Tenderness: There is no abdominal tenderness. Musculoskeletal: Normal range of motion. Skin:     General: Skin is warm and dry. Neurological:      General: No focal deficit present. Mental Status: He is alert and oriented to person, place, and time. Mental status is at baseline. Psychiatric:         Mood and Affect: Mood normal.          Diagnostic Study Results     Labs -     Recent Results (from the past 12 hour(s))   CBC WITH AUTOMATED DIFF    Collection Time: 12/22/20  4:13 PM   Result Value Ref Range    WBC 6.9 4.1 - 11.1 K/uL    RBC 4.89 4. 10 - 5.70 M/uL    HGB 14.3 12.1 - 17.0 g/dL    HCT 45.1 36.6 - 50.3 %    MCV 92.2 80.0 - 99.0 FL    MCH 29.2 26.0 - 34.0 PG    MCHC 31.7 30.0 - 36.5 g/dL    RDW 12.5 11.5 - 14.5 %    PLATELET 697 058 - 900 K/uL    MPV 9.8 8.9 - 12.9 FL    NRBC 0.0 0  WBC    ABSOLUTE NRBC 0.00 0.00 - 0.01 K/uL    NEUTROPHILS 41 32 - 75 %    LYMPHOCYTES 44 12 - 49 %    MONOCYTES 12 5 - 13 %    EOSINOPHILS 2 0 - 7 %    BASOPHILS 1 0 - 1 %    IMMATURE GRANULOCYTES 0 0.0 - 0.5 %    ABS. NEUTROPHILS 2.8 1.8 - 8.0 K/UL    ABS. LYMPHOCYTES 3.1 0.8 - 3.5 K/UL    ABS. MONOCYTES 0.8 0.0 - 1.0 K/UL    ABS. EOSINOPHILS 0.1 0.0 - 0.4 K/UL    ABS. BASOPHILS 0.0 0.0 - 0.1 K/UL    ABS. IMM.  GRANS. 0.0 0.00 - 0.04 K/UL    DF AUTOMATED     METABOLIC PANEL, COMPREHENSIVE    Collection Time: 12/22/20  4:13 PM   Result Value Ref Range    Sodium 137 136 - 145 mmol/L    Potassium 4.0 3.5 - 5.1 mmol/L    Chloride 104 97 - 108 mmol/L    CO2 30 21 - 32 mmol/L    Anion gap 3 (L) 5 - 15 mmol/L    Glucose 120 (H) 65 - 100 mg/dL    BUN 12 6 - 20 MG/DL    Creatinine 1.27 0.70 - 1.30 MG/DL    BUN/Creatinine ratio 9 (L) 12 - 20      GFR est AA >60 >60 ml/min/1.73m2    GFR est non-AA 58 (L) >60 ml/min/1.73m2    Calcium 9.3 8.5 - 10.1 MG/DL    Bilirubin, total 0.5 0.2 - 1.0 MG/DL    ALT (SGPT) 22 12 - 78 U/L    AST (SGOT) 13 (L) 15 - 37 U/L    Alk. phosphatase 97 45 - 117 U/L    Protein, total 7.9 6.4 - 8.2 g/dL    Albumin 3.8 3.5 - 5.0 g/dL    Globulin 4.1 (H) 2.0 - 4.0 g/dL    A-G Ratio 0.9 (L) 1.1 - 2.2     TROPONIN I    Collection Time: 12/22/20  4:13 PM   Result Value Ref Range    Troponin-I, Qt. <0.05 <0.05 ng/mL       Radiologic Studies -   CTA CHEST W OR W WO CONT   Final Result   IMPRESSION: No evidence pulmonary embolism. Nonspecific bibasilar groundglass   opacities. XR CHEST PORT   Final Result   IMPRESSION:   1. No radiographic evidence of acute cardiopulmonary disease. CT Results  (Last 48 hours)               12/22/20 1840  CTA CHEST W OR W WO CONT Final result    Impression:  IMPRESSION: No evidence pulmonary embolism. Nonspecific bibasilar groundglass   opacities. Narrative:  INDICATION: Chest pain. Dyspnea on exertion. History of PE.       CT pulmonary angiogram is performed with 2.5 mm collimation. 74 mL of nonionic   IV Isovue-370 was administered for exam. 3D coronal and sagittal postprocessed   images were performed for this examination. CT dose reduction was achieved through use of a standardized protocol tailored   for this examination and automatic exposure control for dose modulation. Chest:        CTPA: The pulmonary arteries are well opacified and there is no evidence of   pulmonary embolism. Thoracic aorta is normal in course and caliber and there is   no aortic dissection. Lymph nodes: There is no axillary, mediastinal or hilar lymphadenopathy. Heart: The heart is normal in the size and there is no pericardial effusion. Lungs: There is nonspecific glass in the left and right lower lobes. Lungs are   otherwise clear. Pleura: There is no pleural fluid. Bones: No lytic or sclerotic osseous lesion is visualized. Upper abdomen: The visualized upper abdominal structures are normal.               CXR Results  (Last 48 hours)               12/22/20 1657  XR CHEST PORT Final result    Impression:  IMPRESSION:   1. No radiographic evidence of acute cardiopulmonary disease. Narrative:  INDICATION: . chest pain   Additional history:   COMPARISON: Previous chest xray, 10/31/2019 and 8/26/2018. LIMITATIONS: Portable technique. Rajinder Ramsey FINDINGS: Single frontal view of the chest.    .   Lines/tubes/surgical: None. Heart/mediastinum: Unremarkable. Lungs/pleura:  No focal consolidation or mass. No visualized pleural effusion or   pneumothorax. Additional Comments: None. .               Medical Decision Making   I am the first provider for this patient. I reviewed the vital signs, available nursing notes, past medical history, past surgical history, family history and social history. Vital Signs-Reviewed the patient's vital signs. Patient Vitals for the past 12 hrs:   Temp Pulse Resp BP SpO2   12/22/20 1604 98.1 °F (36.7 °C) (!) 59 16 (!) 158/89 99 %       Records Reviewed: Nursing Notes and Old Medical Records    Provider Notes (Medical Decision Making):   Patient presenting with shortness of breath, back pain and chest pain. Given his risk factors, his differential includes PE, ACS, pneumonia, acute bronchitis. We will plan to get EKG, lab work and consider CTA. His vitals are normal here. ED Course:   Initial assessment performed. The patients presenting problems have been discussed, and they are in agreement with the care plan formulated and outlined with them. I have encouraged them to ask questions as they arise throughout their visit.        Critical Care Time:   0    Disposition:  Discharge Note:  The patient has been re-evaluated and is ready for discharge. Reviewed available results with patient. Counseled patient on diagnosis and care plan. Patient has expressed understanding, and all questions have been answered. Patient agrees with plan and agrees to follow up as recommended, or to return to the ED if their symptoms worsen. Discharge instructions have been provided and explained to the patient, along with reasons to return to the ED. PLAN:  Current Discharge Medication List      START taking these medications    Details   azithromycin (Zithromax Z-Edwin) 250 mg tablet Take 2 tablets on first day, and then 1 tablet daily for next 4 days. Qty: 6 Tab, Refills: 0           2. Follow-up Information     Follow up With Specialties Details Why Contact Info    Oly Mercedes NP Nurse Practitioner  As needed [address] [place] [address]  [phone]          3. Return to ED if worse     Diagnosis     Clinical Impression:   1. Pneumonia of both lungs due to infectious organism, unspecified part of lung        Attestations:    Ralph Padgett M.D. Please note that this dictation was completed with [employer], the computer voice recognition software. Quite often unanticipated grammatical, syntax, homophones, and other interpretive errors are inadvertently transcribed by the computer software. Please disregard these errors. Please excuse any errors that have escaped final proofreading. Thank you.

## 2020-12-22 NOTE — PROGRESS NOTES
Patient called and stated that he as feeling very fatigued was unable to do his usual walk around the neighborhood, was slightly winded and had a terrible headache. He stated that this is what he felt like a few years back when he had a \"blood clot in my lungs\"  He also stated that his wife had just recovered from covid 19 about 3 weeks ago. He was never tested and just \"assumed I had it\"  Once she tested negative upon the retest he \"assumed I was negative also. \"  advised him to go to the ED and get checked out due to his symptoms, hx of pulmonary embolism and covid exposure. He stated understanding.

## 2020-12-23 ENCOUNTER — PATIENT OUTREACH (OUTPATIENT)
Dept: CASE MANAGEMENT | Age: 60
End: 2020-12-23

## 2020-12-23 LAB
ATRIAL RATE: 64 BPM
CALCULATED P AXIS, ECG09: 40 DEGREES
CALCULATED R AXIS, ECG10: -47 DEGREES
CALCULATED T AXIS, ECG11: 28 DEGREES
DIAGNOSIS, 93000: NORMAL
P-R INTERVAL, ECG05: 202 MS
Q-T INTERVAL, ECG07: 410 MS
QRS DURATION, ECG06: 114 MS
QTC CALCULATION (BEZET), ECG08: 422 MS
VENTRICULAR RATE, ECG03: 64 BPM

## 2020-12-23 NOTE — DISCHARGE INSTRUCTIONS
Patient Education        Pneumonia: Care Instructions  Your Care Instructions     Pneumonia is an infection of the lungs. Most cases are caused by infections from bacteria or viruses. Pneumonia may be mild or very severe. If it is caused by bacteria, you will be treated with antibiotics. It may take a few weeks to a few months to recover fully from pneumonia, depending on how sick you were and whether your overall health is good. Follow-up care is a key part of your treatment and safety. Be sure to make and go to all appointments, and call your doctor if you are having problems. It's also a good idea to know your test results and keep a list of the medicines you take. How can you care for yourself at home? · Take your antibiotics exactly as directed. Do not stop taking the medicine just because you are feeling better. You need to take the full course of antibiotics. · Take your medicines exactly as prescribed. Call your doctor if you think you are having a problem with your medicine. · Get plenty of rest and sleep. You may feel weak and tired for a while, but your energy level will improve with time. · To prevent dehydration, drink plenty of fluids, enough so that your urine is light yellow or clear like water. Choose water and other caffeine-free clear liquids until you feel better. If you have kidney, heart, or liver disease and have to limit fluids, talk with your doctor before you increase the amount of fluids you drink. · Take care of your cough so you can rest. A cough that brings up mucus from your lungs is common with pneumonia. It is one way your body gets rid of the infection. But if coughing keeps you from resting or causes severe fatigue and chest-wall pain, talk to your doctor. He or she may suggest that you take a medicine to reduce the cough. · Use a vaporizer or humidifier to add moisture to your bedroom. Follow the directions for cleaning the machine.   · Do not smoke or allow others to smoke around you. Smoke will make your cough last longer. If you need help quitting, talk to your doctor about stop-smoking programs and medicines. These can increase your chances of quitting for good. · Take an over-the-counter pain medicine, such as acetaminophen (Tylenol), ibuprofen (Advil, Motrin), or naproxen (Aleve). Read and follow all instructions on the label. · Do not take two or more pain medicines at the same time unless the doctor told you to. Many pain medicines have acetaminophen, which is Tylenol. Too much acetaminophen (Tylenol) can be harmful. · If you were given a spirometer to measure how well your lungs are working, use it as instructed. This can help your doctor tell how your recovery is going. · To prevent pneumonia in the future, talk to your doctor about getting a flu vaccine (once a year) and a pneumococcal vaccine (one time only for most people). When should you call for help? Call 911 anytime you think you may need emergency care. For example, call if:    · You have severe trouble breathing. Call your doctor now or seek immediate medical care if:    · You cough up dark brown or bloody mucus (sputum).     · You have new or worse trouble breathing.     · You are dizzy or lightheaded, or you feel like you may faint. Watch closely for changes in your health, and be sure to contact your doctor if:    · You have a new or higher fever.     · You are coughing more deeply or more often.     · You are not getting better after 2 days (48 hours).     · You do not get better as expected. Where can you learn more? Go to http://www.[employer].com/  Enter D336 in the search box to learn more about \"Pneumonia: Care Instructions. \"  Current as of: February 24, 2020               Content Version: 12.6  © 3677-8114 [employer]. Care instructions adapted under license by [employer] (which disclaims liability or warranty for this information).  If you have questions about a medical condition or this instruction, always ask your healthcare professional. [place] [address] any warranty or liability for your use of this information.

## 2020-12-23 NOTE — PROGRESS NOTES
Patient contacted regarding recent discharge and COVID-19 risk. Discussed COVID-19 related testing which was not done at this time. Test results were not done. Patient informed of results, if available? no    Outreach made within 2 business days of discharge: Yes    Care Transition Nurse/ Ambulatory Care Manager/ LPN Care Coordinator contacted the patient by telephone to perform post discharge assessment. Verified name and  with patient as identifiers. Patient has following risk factors of: diabetes and pulmonary embolus. CTN/ACM/LPN reviewed discharge instructions, medical action plan and red flags related to discharge diagnosis. Reviewed and educated them on any new and changed medications related to discharge diagnosis. Advised obtaining a 90-day supply of all daily and as-needed medications. Advance Care Planning:   Does patient have an Advance Directive: health care decision makers updated    Education provided regarding infection prevention, and signs and symptoms of COVID-19 and when to seek medical attention with patient who verbalized understanding. Discussed exposure protocols and quarantine from [address] you at higher risk for severe illness  and given an opportunity for questions and concerns. The patient agrees to contact the COVID-19 hotline [phone] or PCP office for questions related to their healthcare. CTN/ACM/LPN provided contact information for future reference. From CDC: Are you at higher risk for severe illness?  Wash your hands often.  Avoid close contact (6 feet, which is about two arm lengths) with people who are sick.  Put distance between yourself and other people if COVID-19 is spreading in your community.  Clean and disinfect frequently touched surfaces.  Avoid all cruise travel and non-essential air travel.  Call your healthcare professional if you have concerns about COVID-19 and your underlying condition or if you are sick.     For more information on steps you can take to protect yourself, see CDC's How to Protect Yourself      Patient/family/caregiver given information for GetWell Loop and agrees to enroll yes  Patient's preferred e-mail:  N/a  Patient's preferred phone number: [phone]  Based on Loop alert triggers, patient will be contacted by nurse care manager for worsening symptoms. Pt will be further monitored by COVID Loop Team, pending account activation by patient.  based on severity of symptoms and risk factors. Patient reports that around 5 weeks ago his wife had COVID Pneumonia and he helped to take care of her. He developed loss of taste and smell and headaches. Over time he noticed that his activity tolerance wasn't his normal and he couldn't walk as far without feeling SOB with exertion so went to ER for evaluation. Found on xray to have bilat PNA. He never had a COVID test done. He has started Corrinne Corns and is feeling ok. No sx's except for ACOSTA if he really exerts himself. ACM assisted to call [facility] for follow up with PCP and message sent to office to call patient for follow up virtual visit.  No further questions

## 2020-12-27 ENCOUNTER — PATIENT OUTREACH (OUTPATIENT)
Dept: CASE MANAGEMENT | Age: 60
End: 2020-12-27

## 2020-12-27 NOTE — PROGRESS NOTES
Yellow alert noted in remote symptom monitoring program. Messaged patient to notify Suly Asencio if symptoms have worsened since yesterday or if they would like to have a nurse reach out.

## 2021-01-11 ENCOUNTER — VIRTUAL VISIT (OUTPATIENT)
Dept: FAMILY MEDICINE CLINIC | Age: 61
End: 2021-01-11
Payer: COMMERCIAL

## 2021-01-11 DIAGNOSIS — J18.9 PNEUMONIA OF BOTH LOWER LOBES DUE TO INFECTIOUS ORGANISM: Primary | ICD-10-CM

## 2021-01-11 PROCEDURE — 99213 OFFICE O/P EST LOW 20 MIN: CPT | Performed by: NURSE PRACTITIONER

## 2021-01-11 NOTE — PROGRESS NOTES
Estuardo Gadsden is a 63 y.o. male who was seen by synchronous (real-time) audio-video technology on 1/11/2021 for ED Follow-up (12/22/20)        Assessment & Plan:   Diagnoses and all orders for this visit:    1. Pneumonia of both lower lobes due to infectious organism - likely 2/2 COVID.  patient was never tested but had close exposure with wife and symptoms of COVID. He completed zpak, symptoms are improving. patient to monitor symptoms, call if develops fever, chills, chest pains, increasing dyspnea. has follow up 4/1/2021    I spent at least 25 minutes on this visit with this established patient. 712  Subjective:   Patient is seen virtually for ED follow up. Was seen in [id number] [facility] ED on 12/22/2020 - ED records reviewed. Patient states he felt like something in center of chest and back. Was concerned about a blood clot. Wife dx with COVID 11/13/2020. Patient quarantined for 2 weeks. Patient had headache, rash inner arms and thighs, loss of taste. He never tested. HPI from ED:  \"Patient states that back in November his wife and presumably him both had Covid 19. At that time he was having a headache and loss of taste and smell. States that is all improved. Then in the last 4 days started to have some sharp back pain as well as chest pain. Has a history of atrial fibrillation and a PE long time ago and used to be on Eliquis but no longer on any blood thinners. States in the last 4 days he has been walking his usual miles and has been more dyspneic on exertion. Denies any cough, fevers. \"    CXR in ED normal  CT chest - neg for PE, did show nonspecific groundglass abnormalities. Given zpak for pneumonia. Went bowling yesterday, has been walking and feels better. Can do everything he normally does. Does not feel 100% back to baseline. He completed zpak. Denies fever, chills, cough, dyspnea      Prior to Admission medications    Medication Sig Start Date End Date Taking?  Authorizing Provider   aspirin 81 mg chewable tablet Take 81 mg by mouth daily. Yes Provider, Historical   metoprolol tartrate (LOPRESSOR) 25 mg tablet TAKE 1 TABLET BY MOUTH EVERY 12 HOURS 9/30/20  Yes Bony Trevizo NP   fish oil-omega-3 fatty acids (Fish Oil) 340-1,000 mg capsule Take 1 Cap by mouth daily. Yes Provider, Historical   cholecalciferol (VITAMIN D3) 1,000 unit cap Take 1,000 Units by mouth daily. Yes Provider, Historical   azithromycin (Zithromax Z-Edwin) 250 mg tablet Take 2 tablets on first day, and then 1 tablet daily for next 4 days. 12/22/20 1/11/21  Rashel Carrera MD     Patient Active Problem List   Diagnosis Code    Obesity, unspecified E66.9    URI (upper respiratory infection) J06.9    Essential hypertension I10    Cerumen impaction H61.20    Hyperlipidemia with target LDL less than 100 E78.5    Pulmonary embolism (HCC) I26.99    Atrial flutter (HCC) I48.92    Controlled type 2 diabetes mellitus without complication, without long-term current use of insulin (HCC) E11.9    Vitamin D deficiency E55.9    Mixed hyperlipidemia E78.2    Severe obesity (BMI 35.0-39. 9) with comorbidity (HCC) E66.01     Current Outpatient Medications   Medication Sig Dispense Refill    aspirin 81 mg chewable tablet Take 81 mg by mouth daily.  metoprolol tartrate (LOPRESSOR) 25 mg tablet TAKE 1 TABLET BY MOUTH EVERY 12 HOURS 180 Tab 2    fish oil-omega-3 fatty acids (Fish Oil) 340-1,000 mg capsule Take 1 Cap by mouth daily.  cholecalciferol (VITAMIN D3) 1,000 unit cap Take 1,000 Units by mouth daily.        Allergies   Allergen Reactions    Ibuprofen Other (comments)     Increase B/P     Past Medical History:   Diagnosis Date    Atrial flutter ([address] 1/18/2018    Atrial flutter ([address] 1/18/2018    Cerumen impaction 10/14/2013    Controlled type 2 diabetes mellitus without complication, without long-term current use of insulin ([facility]     Controlled type 2 diabetes mellitus without complication, without long-term current use of insulin ([facility] 1/29/2018    Elevated blood pressure 10/14/2013    Essential hypertension     Hyperlipidemia with target LDL less than 100 11/26/2013    Obesity, unspecified 10/10/2013    Pulmonary emboli ([facility] 01/19/2018    ED [facility] -    Pulmonary embolism ([facility] 1/16/2018    Vitamin D deficiency 1/29/2018     History reviewed. No pertinent surgical history. Family History   Problem Relation Age of Onset   Viviana Curl Hypertension Mother     Hypertension Father     Kidney Disease Father [de-identified]        on dialysis     Social History     Tobacco Use    Smoking status: Never Smoker    Smokeless tobacco: Never Used   Substance Use Topics    Alcohol use: No       Review of Systems   Constitutional: Negative for chills, fever and malaise/fatigue. HENT: Negative for congestion, ear pain, sinus pain and sore throat. Respiratory: Negative for cough, sputum production, shortness of breath and wheezing. Cardiovascular: Negative for chest pain and palpitations. Gastrointestinal: Negative for nausea and vomiting. Musculoskeletal: Negative for myalgias. Neurological: Negative for dizziness and headaches. Endo/Heme/Allergies: Negative for environmental allergies. Objective:   No flowsheet data found. General: alert, cooperative, no distress   Mental  status: normal mood, behavior, speech, dress, motor activity, and thought processes, able to follow commands   HENT: NCAT   Neck: no visualized mass   Resp: no respiratory distress   Neuro: no gross deficits   Skin: no discoloration or lesions of concern on visible areas   Psychiatric: normal affect, consistent with stated mood, no evidence of hallucinations     Additional exam findings: none      We discussed the expected course, resolution and complications of the diagnosis(es) in detail. Medication risks, benefits, costs, interactions, and alternatives were discussed as indicated.   I advised him to contact the office if his condition worsens, changes or fails to improve as anticipated. He expressed understanding with the diagnosis(es) and plan. Clare Saeed, who was evaluated through a patient-initiated, synchronous (real-time) audio-video encounter, and/or his healthcare decision maker, is aware that it is a billable service, with coverage as determined by his insurance carrier. He provided verbal consent to proceed: Yes, and patient identification was verified. It was conducted pursuant to the emergency declaration under the [address] [place] [address] and the [employer] Act. A caregiver was present when appropriate. Ability to conduct physical exam was limited. I was at home. The patient was at home.       Cindy Rahman NP

## 2021-01-11 NOTE — PROGRESS NOTES
Chief Complaint   Patient presents with    ED Follow-up     12/22/20     Pt still has some chest discomfort. 1. Have you been to the ER, urgent care clinic since your last visit? Hospitalized since your last visit? No    2. Have you seen or consulted any other health care providers outside of the [address] [place] [address] since your last visit? Include any pap smears or colon screening.  No     Eye Exam - Pt aware overdue      Health Maintenance Due   Topic Date Due    Pneumococcal 0-64 years (1 of 1 - PPSV23) 08/23/1965    Eye Exam Retinal or Dilated  08/23/1969    Shingrix Vaccine Age 50> (1 of 2) 08/23/2009    Colorectal Cancer Screening Combo  08/23/2009    MICROALBUMIN Q1  11/05/2020

## 2021-04-01 ENCOUNTER — OFFICE VISIT (OUTPATIENT)
Dept: FAMILY MEDICINE CLINIC | Age: 61
End: 2021-04-01
Payer: COMMERCIAL

## 2021-04-01 VITALS
DIASTOLIC BLOOD PRESSURE: 85 MMHG | OXYGEN SATURATION: 99 % | HEIGHT: 72 IN | BODY MASS INDEX: 36.35 KG/M2 | TEMPERATURE: 96.8 F | RESPIRATION RATE: 12 BRPM | SYSTOLIC BLOOD PRESSURE: 135 MMHG | WEIGHT: 268.4 LBS | HEART RATE: 63 BPM

## 2021-04-01 DIAGNOSIS — E78.5 HYPERLIPIDEMIA, UNSPECIFIED HYPERLIPIDEMIA TYPE: ICD-10-CM

## 2021-04-01 DIAGNOSIS — Z12.11 COLON CANCER SCREENING: ICD-10-CM

## 2021-04-01 DIAGNOSIS — E66.01 SEVERE OBESITY (BMI 35.0-35.9 WITH COMORBIDITY) (HCC): ICD-10-CM

## 2021-04-01 DIAGNOSIS — I10 ESSENTIAL HYPERTENSION WITH GOAL BLOOD PRESSURE LESS THAN 130/80: ICD-10-CM

## 2021-04-01 DIAGNOSIS — Z86.711 HISTORY OF PULMONARY EMBOLISM: ICD-10-CM

## 2021-04-01 DIAGNOSIS — E11.9 CONTROLLED TYPE 2 DIABETES MELLITUS WITHOUT COMPLICATION, WITHOUT LONG-TERM CURRENT USE OF INSULIN (HCC): Primary | ICD-10-CM

## 2021-04-01 PROCEDURE — 99214 OFFICE O/P EST MOD 30 MIN: CPT | Performed by: NURSE PRACTITIONER

## 2021-04-01 NOTE — PROGRESS NOTES
Chief Complaint   Patient presents with    Diabetes    Hypertension     Pt would like to discuss a decrease BP medication. Pt willing to schedule colonoscopy, pt still having darker stools. 1. Have you been to the ER, urgent care clinic since your last visit? Hospitalized since your last visit? No    2. Have you seen or consulted any other health care providers outside of the [address] [place] [address] since your last visit? Include any pap smears or colon screening. No     Eye Exam - Pt aware overdue  Colonoscopy - Pt aware overdue   NO NEW IMMUNIZATIONS SHOWN ON VIIS.      Health Maintenance Due   Topic Date Due    Pneumococcal 0-64 years (1 of 1 - PPSV23) Never done    Eye Exam Retinal or Dilated  Never done    COVID-19 Vaccine (1) Never done    Shingrix Vaccine Age 50> (1 of 2) Never done    Colorectal Cancer Screening Combo  Never done    MICROALBUMIN Q1  11/05/2020

## 2021-04-01 NOTE — PROGRESS NOTES
Sha Lindsay (: 1960) is a 63 y.o. male, established patient, here for evaluation of the following chief complaint(s):  Diabetes and Hypertension       ASSESSMENT/PLAN:  1. Controlled type 2 diabetes mellitus without complication, without long-term current use of insulin (HCC) - A1c 6.1% in 2020. Stable. Patient to continue to work on diet and exercise  2. Essential hypertension with goal blood pressure less than 130/80 - stable on medication  3. Hyperlipidemia, unspecified hyperlipidemia type - patient declined taking a statin despite 10 year risk of CVA/MI at 16% according to risk calculator. Patient wants to try another 6 months of diet and exercise. If no improvement in LDL, will consider statin. Continue ASA  4. Colon cancer screening  -     REFERRAL TO GASTROENTEROLOGY  5. History of pulmonary embolism - on ASA  6. Severe obesity (BMI 35.0-35.9 with comorbidity) ([facility] [address]  -     I have reviewed/discussed the above normal BMI with the patient. I have recommended the following interventions: dietary management education, guidance, and counseling and encourage exercise . Becca Orozco Return in about 6 months (around 10/1/2021). SUBJECTIVE/OBJECTIVE:  HPI  Patient comes in today for follow up.     Pt willing to schedule colonoscopy, pt still having darker stools. BPs at home range 119-120/70. Pt does not check BG at home.     Saw oncologist for hx PE  - no further follow ups needed. Was put on ASA 81mg. Eliquis was stopped. ASA has been shown to reduce the risk of recurrence of PE/DVT by 40%. Has been walking daily. Will walk for 6 miles. States he is feeling better than he has in years. Doing push ups and light jogging.   Weight Metrics 2021 2020 2020 2020 2020 2019 10/31/2019   Weight 268 lb 6.4 oz 265 lb 10.5 oz 269 lb 271 lb 9.6 oz 274 lb 11.2 oz 272 lb 3.2 oz 274 lb 4 oz   BMI 36.4 kg/m2 36.03 kg/m2 36.48 kg/m2 36.84 kg/m2 37.26 kg/m2 36.92 kg/m2 37.2 kg/m2   Not taking atorvastatin.  Patient to work on diet and exercise.  Does not eat beef and cheese.  Eats mostly fish, chicken and turkey.  Minimal pork.  Going towards a more plant based diet. He usually bowls every week    Allergies   Allergen Reactions    Ibuprofen Other (comments)     Increase B/P       Past Medical History:   Diagnosis Date    Atrial flutter ([facility] 1/18/2018    Atrial flutter ([facility] 1/18/2018    Cerumen impaction 10/14/2013    Controlled type 2 diabetes mellitus without complication, without long-term current use of insulin ([facility]     Controlled type 2 diabetes mellitus without complication, without long-term current use of insulin ([facility] [facility] 1/29/2018    Elevated blood pressure 10/14/2013    Essential hypertension     Hyperlipidemia with target LDL less than 100 11/26/2013    Obesity, unspecified 10/10/2013    Pulmonary emboli ([facility] 01/19/2018    [id number] -    Pulmonary embolism ([facility] 1/16/2018    Vitamin D deficiency 1/29/2018       History reviewed. No pertinent surgical history.     Social History     Socioeconomic History    Marital status:      Spouse name: Not on file    Number of children: Not on file    Years of education: Not on file    Highest education level: Not on file   Occupational History    Not on file   Social Needs    Financial resource strain: Not on file    Food insecurity     Worry: Not on file     Inability: Not on file    Transportation needs     Medical: Not on file     Non-medical: Not on file   Tobacco Use    Smoking status: Never Smoker    Smokeless tobacco: Never Used   Substance and Sexual Activity    Alcohol use: No    Drug use: No    Sexual activity: Yes     Partners: Female   Lifestyle    Physical activity     Days per week: Not on file     Minutes per session: Not on file    Stress: Not on file   Relationships    Social connections     Talks on phone: Not on file     Gets together: Not on file     Attends Zoroastrianism service: Not on file     Active member of club or organization: Not on file     Attends meetings of clubs or organizations: Not on file     Relationship status: Not on file    Intimate partner violence     Fear of current or ex partner: Not on file     Emotionally abused: Not on file     Physically abused: Not on file     Forced sexual activity: Not on file   Other Topics Concern     Service Not Asked    Blood Transfusions Not Asked    Caffeine Concern Not Asked    Occupational Exposure Not Asked   [employer] Hazards Not Asked    Sleep Concern Not Asked    Stress Concern Not Asked    Weight Concern Not Asked    Special Diet Not Asked    Back Care Not Asked    Exercise Not Asked    Bike Helmet Not Asked   [address] Not Asked    Self-Exams Not Asked   Social History Narrative     to [employer]. Retired from [employer]       Family History   Problem Relation Age of Onset    Hypertension Mother     Hypertension Father     Kidney Disease Father [de-identified]        on dialysis       Current Outpatient Medications   Medication Sig    aspirin 81 mg chewable tablet Take 81 mg by mouth daily.  metoprolol tartrate (LOPRESSOR) 25 mg tablet TAKE 1 TABLET BY MOUTH EVERY 12 HOURS    fish oil-omega-3 fatty acids (Fish Oil) 340-1,000 mg capsule Take 1 Cap by mouth daily.  cholecalciferol (VITAMIN D3) 1,000 unit cap Take 1,000 Units by mouth daily. No current facility-administered medications for this visit. Review of Systems   Constitutional: Negative for appetite change, chills, fatigue, fever and unexpected weight change. Respiratory: Negative for cough and shortness of breath. Cardiovascular: Negative for chest pain, palpitations and leg swelling. Gastrointestinal: Negative for abdominal pain, constipation, diarrhea, nausea and vomiting. Endocrine: Negative for polydipsia, polyphagia and polyuria. Genitourinary: Negative for dysuria, frequency and urgency.    Neurological: Negative for dizziness, light-headedness, numbness and headaches. Psychiatric/Behavioral: Negative for dysphoric mood and sleep disturbance. The patient is not nervous/anxious. Visit Vitals  /85 (BP 1 Location: Right arm, BP Patient Position: Sitting, BP Cuff Size: Adult)   Pulse 63   Temp 96.8 °F (36 °C) (Skin)   Resp 12   Ht 6' (1.829 m)   Wt 268 lb 6.4 oz (121.7 kg)   SpO2 99%   BMI 36.40 kg/m²     Physical Exam  Constitutional:       Appearance: Normal appearance. He is well-developed. HENT:      Right Ear: Hearing, tympanic membrane, ear canal and external ear normal.      Left Ear: Hearing, tympanic membrane, ear canal and external ear normal.      Nose: Nose normal.      Mouth/Throat:      Pharynx: Uvula midline. Cardiovascular:      Rate and Rhythm: Normal rate and regular rhythm. Heart sounds: Normal heart sounds. Pulmonary:      Effort: Pulmonary effort is normal.      Breath sounds: Normal breath sounds. Abdominal:      General: Bowel sounds are normal.      Palpations: Abdomen is soft. Tenderness: There is no abdominal tenderness. Comments: abd obese   Lymphadenopathy:      Head:      Right side of head: No submental, submandibular or tonsillar adenopathy. Left side of head: No submental, submandibular or tonsillar adenopathy. Cervical: No cervical adenopathy. Skin:     General: Skin is warm and dry. Neurological:      Mental Status: He is alert and oriented to person, place, and time. Psychiatric:         Behavior: Behavior normal. Behavior is cooperative. Thought Content: Thought content normal.         Judgment: Judgment normal.       On this date 04/01/2021 I have spent 32 minutes reviewing previous notes, test results and face to face with the patient discussing the diagnosis and importance of compliance with the treatment plan as well as documenting on the day of the visit. An electronic signature was used to authenticate this note.   -- Ricky Hyatt Santhosh, NP

## 2021-05-24 ENCOUNTER — DOCUMENTATION ONLY (OUTPATIENT)
Dept: FAMILY MEDICINE CLINIC | Age: 61
End: 2021-05-24

## 2021-05-24 NOTE — PROGRESS NOTES
Received report from [facility] of pt's completed colonoscopy, repeat 10 years dated 5/24/21. Given to Maribeth Mendez NP to sign and send to central scanning.

## 2021-05-26 ENCOUNTER — TELEPHONE (OUTPATIENT)
Dept: FAMILY MEDICINE CLINIC | Age: 61
End: 2021-05-26

## 2021-05-26 DIAGNOSIS — I10 ESSENTIAL HYPERTENSION WITH GOAL BLOOD PRESSURE LESS THAN 130/80: ICD-10-CM

## 2021-05-26 RX ORDER — METOPROLOL TARTRATE 25 MG/1
TABLET, FILM COATED ORAL
Qty: 180 TABLET | Refills: 2 | Status: SHIPPED | OUTPATIENT
Start: 2021-05-26 | End: 2022-08-21

## 2021-05-26 NOTE — TELEPHONE ENCOUNTER
----- Message from Jamal Nevarez sent at 5/26/2021  7:46 AM EDT -----  Regarding: Dr Trevizo/telephone  Pt is calling to see if he can take Mucinex with his current medications and want to see if he call lower his intake of his Metoprolol Tartrate 25 mg, please call pt at [phone]

## 2021-05-26 NOTE — TELEPHONE ENCOUNTER
Verified patient with two type of identifiers. Spoke with pt and informed he can take Mucinex but avoid Mucinex DM. Pt concerned that he is still having lack of energy, constipation and bloating and is wondering if caused by Metoprolol. Pt states had colonoscopy and was told he was \"all clear\". Pt states BP at home is still within range and HR ranges from some 40s to 50-60 or higher when exercising.

## 2021-05-26 NOTE — TELEPHONE ENCOUNTER
Patient can decrease metoprolol to 1/2 tablet BID. Monitor BP and HR. BP should be under 140/90  Normal HR     Orders Placed This Encounter    metoprolol tartrate (LOPRESSOR) 25 mg tablet     Sig: TAKE 1/2 TABLET BY MOUTH EVERY 12 HOURS  Indications: myocardial reinfarction prevention     Dispense:  180 Tablet     Refill:  2     Update dose.   No refill needed at this time

## 2021-05-27 NOTE — TELEPHONE ENCOUNTER
Verified patient with two type of identifiers. Informed pt of suggestions/instructions per Lanie Hudson NP. Pt verbalized understanding.

## 2021-07-20 NOTE — PROGRESS NOTES
[address] [place] [address]  [phone]     Subjective:      Mikael Jaramillo is a 63 y.o. male is here for routine f/u. He has a PMHx of PAF, saddle PE (unprovoked), HTN, HLD,  Diet controlled DM and obesity. Last seen by us in 6/2020. Since last OV,  Visit with PCP 1/2021 : Pneumonia of both lower lobes due to infectious organism - likely 2/2 COVID.  patient was never tested but had close exposure with wife and symptoms of COVID. Was seen in ED [facility] ED on 12/22/2020 - ED records reviewed. Patient states he felt like something in center of chest and back. Was concerned about a blood clot. Wife dx with COVID 11/13/2020    Today, no further sob. He walks 3-5 miles 4-5 times a week. No exertional symptoms. Received both of his covid vaccines, did fine. The patient denies chest pain/ shortness of breath, orthopnea, PND, LE edema, palpitations, syncope, or presyncope. Patient Active Problem List    Diagnosis Date Noted    Severe obesity (BMI 35.0-39. 9) with comorbidity ([address] 05/01/2018    Mixed hyperlipidemia 03/26/2018    Controlled type 2 diabetes mellitus without complication, without long-term current use of insulin ([address] 01/29/2018    Vitamin D deficiency 01/29/2018    Atrial flutter ([address] 01/18/2018    Pulmonary embolism ([address] 01/16/2018    Hyperlipidemia with target LDL less than 100 11/26/2013    URI (upper respiratory infection) 10/14/2013    Essential hypertension 10/14/2013    Cerumen impaction 10/14/2013    Obesity, unspecified 10/10/2013      Bill Chen NP  Past Medical History:   Diagnosis Date    Atrial flutter ([address] 1/18/2018    Atrial flutter ([address] 1/18/2018    Cerumen impaction 10/14/2013    Controlled type 2 diabetes mellitus without complication, without long-term current use of insulin ([address]     Controlled type 2 diabetes mellitus without complication, without long-term current use of insulin ([address] 1/29/2018    Elevated blood pressure 10/14/2013    Essential hypertension     Hyperlipidemia with target LDL less than 100 11/26/2013    Obesity, unspecified 10/10/2013    Pulmonary emboli ([facility] 01/19/2018    ED [facility] -    Pulmonary embolism ([facility] 1/16/2018    Vitamin D deficiency 1/29/2018      No past surgical history on file. Allergies   Allergen Reactions    Ibuprofen Other (comments)     Increase B/P      Family History   Problem Relation Age of Onset    Hypertension Mother     Hypertension Father     Kidney Disease Father [de-identified]        on dialysis      Social History     Socioeconomic History    Marital status:      Spouse name: Not on file    Number of children: Not on file    Years of education: Not on file    Highest education level: Not on file   Occupational History    Not on file   Tobacco Use    Smoking status: Never Smoker    Smokeless tobacco: Never Used   Vaping Use    Vaping Use: Never used   Substance and Sexual Activity    Alcohol use: No    Drug use: No    Sexual activity: Yes     Partners: Female   Other Topics Concern     Service Not Asked    Blood Transfusions Not Asked    Caffeine Concern Not Asked    Occupational Exposure Not Asked    Hobby Hazards Not Asked    Sleep Concern Not Asked    Stress Concern Not Asked    Weight Concern Not Asked    Special Diet Not Asked    Back Care Not Asked    Exercise Not Asked    Bike Helmet Not Asked   [address] Not Asked    Self-Exams Not Asked   Social History Narrative     to [employer]. Retired from [address] Strain:     Difficulty of Paying Living Expenses:    Food Insecurity:     Worried About [address] in the Last Year:    [address] in the Last Year:    Transportation Needs:     Lack of Transportation (Medical):      Lack of Transportation (Non-Medical):    Physical Activity:     Days of Exercise per Week:     Minutes of Exercise per Session:    Stress:     Feeling of Stress :    Social Connections:     Frequency of Communication with Friends and Family:     Frequency of Social Gatherings with Friends and Family:     Attends Baptism Services:     Active Member of Clubs or Organizations:     Attends Club or Organization Meetings:     Marital Status:    Intimate Partner Violence:     Fear of Current or Ex-Partner:     Emotionally Abused:     Physically Abused:     Sexually Abused:       Current Outpatient Medications   Medication Sig    metoprolol tartrate (LOPRESSOR) 25 mg tablet TAKE 1/2 TABLET BY MOUTH EVERY 12 HOURS  Indications: myocardial reinfarction prevention    aspirin 81 mg chewable tablet Take 81 mg by mouth daily.  fish oil-omega-3 fatty acids (Fish Oil) 340-1,000 mg capsule Take 1 Cap by mouth daily.  cholecalciferol (VITAMIN D3) 1,000 unit cap Take 1,000 Units by mouth daily. No current facility-administered medications for this visit. Review of Symptoms:  11 systems reviewed, negative other than as stated in the HPI    Physical ExamPhysical Exam:    There were no vitals filed for this visit. There is no height or weight on file to calculate BMI. General PE  Gen:  NAD  Mental Status - Alert. General Appearance - Not in acute distress. HEENT:  PERRL, no carotid bruits or JVD  Chest and Lung Exam   Inspection: Accessory muscles - No use of accessory muscles in breathing. Auscultation:   Breath sounds: - Normal.   Cardiovascular   Inspection: Jugular vein - Bilateral - Inspection Normal.   Palpation/Percussion:   Apical Impulse: - Normal.   Auscultation: Rhythm - Regular. Heart Sounds - S1 WNL and S2 WNL. No S3 or S4. Murmurs & Other Heart Sounds: Auscultation of the heart reveals - No Murmurs. Peripheral Vascular   Upper Extremity: Inspection - Bilateral - No Cyanotic nailbeds or Digital clubbing. Lower Extremity:   Palpation: Edema - Bilateral - No edema.   Abdomen:   Soft, non-tender, bowel sounds are active. Neuro: A&O times 3, CN and motor grossly WNL    Labs:   Lab Results   Component Value Date/Time    Cholesterol, total 208 (H) 09/30/2020 09:00 AM    Cholesterol, total 226 (H) 11/05/2019 08:30 AM    Cholesterol, total 228 (H) 04/23/2019 08:24 AM    Cholesterol, total 219 (H) 11/06/2018 08:49 AM    Cholesterol, total 200 (H) 01/18/2018 04:40 AM    HDL Cholesterol 60 09/30/2020 09:00 AM    HDL Cholesterol 64 11/05/2019 08:30 AM    HDL Cholesterol 60 04/23/2019 08:24 AM    HDL Cholesterol 61 11/06/2018 08:49 AM    HDL Cholesterol 54 01/18/2018 04:40 AM    LDL, calculated 133 (H) 09/30/2020 09:00 AM    LDL, calculated 146 (H) 11/05/2019 08:30 AM    LDL, calculated 148 (H) 04/23/2019 08:24 AM    LDL, calculated 138 (H) 11/06/2018 08:49 AM    LDL, calculated 125.4 (H) 01/18/2018 04:40 AM    Triglyceride 86 09/30/2020 09:00 AM    Triglyceride 82 11/05/2019 08:30 AM    Triglyceride 100 04/23/2019 08:24 AM    Triglyceride 99 11/06/2018 08:49 AM    Triglyceride 103 01/18/2018 04:40 AM    CHOL/HDL Ratio 3.7 01/18/2018 04:40 AM     No results found for: CPK, CPKX, CPX  Lab Results   Component Value Date/Time    Sodium 137 12/22/2020 04:13 PM    Potassium 4.0 12/22/2020 04:13 PM    Chloride 104 12/22/2020 04:13 PM    CO2 30 12/22/2020 04:13 PM    Anion gap 3 (L) 12/22/2020 04:13 PM    Glucose 120 (H) 12/22/2020 04:13 PM    BUN 12 12/22/2020 04:13 PM    Creatinine 1.27 12/22/2020 04:13 PM    BUN/Creatinine ratio 9 (L) 12/22/2020 04:13 PM    GFR est AA >60 12/22/2020 04:13 PM    GFR est non-AA 58 (L) 12/22/2020 04:13 PM    Calcium 9.3 12/22/2020 04:13 PM    Bilirubin, total 0.5 12/22/2020 04:13 PM    Alk. phosphatase 97 12/22/2020 04:13 PM    Protein, total 7.9 12/22/2020 04:13 PM    Albumin 3.8 12/22/2020 04:13 PM    Globulin 4.1 (H) 12/22/2020 04:13 PM    A-G Ratio 0.9 (L) 12/22/2020 04:13 PM    ALT (SGPT) 22 12/22/2020 04:13 PM       EKG:  SR     Assessment:     Assessment:      1.  Essential hypertension    2. Mixed hyperlipidemia    3. Atrial flutter, unspecified type ([address]    4. Saddle embolus of pulmonary artery without acute cor pulmonale, unspecified chronicity ([address]    5. Severe obesity (BMI 35.0-39. 9) with comorbidity ([address]        No orders of the defined types were placed in this encounter. Plan:     Paroxysmal atrial fib/flutter in setting of PE:    s/p MIQUEL/DCCV 2/18  Echo in 1/2018 with preserved LVEF 55-60%  Maintaining sinus rhythm. With CHADVASc score does not need oral AC for a. Fib, continue ASA    HTN  Controlled with BB    HLD  10/2020    11/19 LDL at 146. Lipids and labs followed by PCP. Working with dietary changes, exercise  Good functional capacity. Option of coronary calcium score prev d/w pt and offered, holding off for now. Diet controlled DM    Saddle embolus of pulmonary artery without acute cor pulmonale  1st episode/event of  PE in 01/2018 - unprovoked  Prev followed by Dr Timothy Cloud 9/2020:Apixaban took for > 2 years. Now on ASA. Asymptomatic. We shall discontinue Apixaban and switch to an ASA daily. ASA has been shown to reduce the risk of recurrence of PE/DVT by 40%. Note: 12/2020 chest CTA no recurrent PE.       Discussed importance of diet and exercise - eventual goal of 30 - 60 minutes, 5-7 times a week as per AHA guideline.         Continue current care and f/u in 1 yr or sooner if needed        Flor Hernandez NP

## 2021-07-27 ENCOUNTER — OFFICE VISIT (OUTPATIENT)
Dept: CARDIOLOGY CLINIC | Age: 61
End: 2021-07-27
Payer: COMMERCIAL

## 2021-07-27 VITALS
HEART RATE: 64 BPM | SYSTOLIC BLOOD PRESSURE: 120 MMHG | RESPIRATION RATE: 16 BRPM | OXYGEN SATURATION: 99 % | HEIGHT: 72 IN | BODY MASS INDEX: 35.66 KG/M2 | WEIGHT: 263.3 LBS | DIASTOLIC BLOOD PRESSURE: 80 MMHG

## 2021-07-27 DIAGNOSIS — E78.2 MIXED HYPERLIPIDEMIA: ICD-10-CM

## 2021-07-27 DIAGNOSIS — I48.92 ATRIAL FLUTTER, UNSPECIFIED TYPE (HCC): ICD-10-CM

## 2021-07-27 DIAGNOSIS — I26.92 SADDLE EMBOLUS OF PULMONARY ARTERY WITHOUT ACUTE COR PULMONALE, UNSPECIFIED CHRONICITY (HCC): ICD-10-CM

## 2021-07-27 DIAGNOSIS — I10 ESSENTIAL HYPERTENSION: Primary | ICD-10-CM

## 2021-07-27 DIAGNOSIS — E66.01 SEVERE OBESITY (BMI 35.0-39.9) WITH COMORBIDITY (HCC): ICD-10-CM

## 2021-07-27 PROCEDURE — 93000 ELECTROCARDIOGRAM COMPLETE: CPT | Performed by: NURSE PRACTITIONER

## 2021-07-27 PROCEDURE — 99214 OFFICE O/P EST MOD 30 MIN: CPT | Performed by: NURSE PRACTITIONER

## 2021-07-27 NOTE — PROGRESS NOTES
Chief Complaint   Patient presents with    Follow-up    Hypertension    Cholesterol Problem    Irregular Heart Beat     1. Have you been to the ER, urgent care clinic since your last visit? Yes 12/20 Hospitalized since your last visit? No    2. Have you seen or consulted any other health care providers outside of the [address] [place] [address] since your last visit? No Include any pap smears or colon screening. Yes 6/2021 (jennifer)  Has been vaccinated with Herndon Peter no card.

## 2021-10-01 ENCOUNTER — OFFICE VISIT (OUTPATIENT)
Dept: FAMILY MEDICINE CLINIC | Age: 61
End: 2021-10-01
Payer: COMMERCIAL

## 2021-10-01 VITALS
DIASTOLIC BLOOD PRESSURE: 86 MMHG | BODY MASS INDEX: 35.68 KG/M2 | OXYGEN SATURATION: 98 % | TEMPERATURE: 96.8 F | SYSTOLIC BLOOD PRESSURE: 144 MMHG | HEIGHT: 72 IN | HEART RATE: 70 BPM | RESPIRATION RATE: 14 BRPM | WEIGHT: 263.4 LBS

## 2021-10-01 DIAGNOSIS — E11.9 CONTROLLED TYPE 2 DIABETES MELLITUS WITHOUT COMPLICATION, WITHOUT LONG-TERM CURRENT USE OF INSULIN (HCC): ICD-10-CM

## 2021-10-01 DIAGNOSIS — I48.0 PAROXYSMAL ATRIAL FIBRILLATION (HCC): ICD-10-CM

## 2021-10-01 DIAGNOSIS — Z86.711 HISTORY OF PULMONARY EMBOLISM: ICD-10-CM

## 2021-10-01 DIAGNOSIS — Z86.79 HISTORY OF ATRIAL FIBRILLATION: ICD-10-CM

## 2021-10-01 DIAGNOSIS — E78.5 HYPERLIPIDEMIA, UNSPECIFIED HYPERLIPIDEMIA TYPE: ICD-10-CM

## 2021-10-01 DIAGNOSIS — E66.01 SEVERE OBESITY (BMI 35.0-39.9) WITH COMORBIDITY (HCC): ICD-10-CM

## 2021-10-01 DIAGNOSIS — I10 ESSENTIAL HYPERTENSION WITH GOAL BLOOD PRESSURE LESS THAN 130/80: Primary | ICD-10-CM

## 2021-10-01 DIAGNOSIS — Z12.5 PROSTATE CANCER SCREENING: ICD-10-CM

## 2021-10-01 PROBLEM — I26.99 PULMONARY EMBOLISM (HCC): Status: RESOLVED | Noted: 2018-01-16 | Resolved: 2021-10-01

## 2021-10-01 PROBLEM — M47.817 LUMBOSACRAL SPONDYLOSIS WITHOUT MYELOPATHY: Status: ACTIVE | Noted: 2017-08-24

## 2021-10-01 PROBLEM — I48.92 ATRIAL FLUTTER (HCC): Status: RESOLVED | Noted: 2018-01-18 | Resolved: 2021-10-01

## 2021-10-01 LAB
ALBUMIN SERPL-MCNC: 3.8 G/DL (ref 3.5–5)
ALBUMIN/GLOB SERPL: 1.1 {RATIO} (ref 1.1–2.2)
ALP SERPL-CCNC: 90 U/L (ref 45–117)
ALT SERPL-CCNC: 22 U/L (ref 12–78)
ANION GAP SERPL CALC-SCNC: 4 MMOL/L (ref 5–15)
APPEARANCE UR: CLEAR
AST SERPL-CCNC: 16 U/L (ref 15–37)
BASOPHILS # BLD: 0 K/UL (ref 0–0.1)
BASOPHILS NFR BLD: 1 % (ref 0–1)
BILIRUB SERPL-MCNC: 0.6 MG/DL (ref 0.2–1)
BILIRUB UR QL: NEGATIVE
BUN SERPL-MCNC: 14 MG/DL (ref 6–20)
BUN/CREAT SERPL: 13 (ref 12–20)
CALCIUM SERPL-MCNC: 9.5 MG/DL (ref 8.5–10.1)
CHLORIDE SERPL-SCNC: 107 MMOL/L (ref 97–108)
CHOLEST SERPL-MCNC: 216 MG/DL
CO2 SERPL-SCNC: 27 MMOL/L (ref 21–32)
COLOR UR: NORMAL
CREAT SERPL-MCNC: 1.1 MG/DL (ref 0.7–1.3)
CREAT UR-MCNC: 181 MG/DL
DIFFERENTIAL METHOD BLD: NORMAL
EOSINOPHIL # BLD: 0.1 K/UL (ref 0–0.4)
EOSINOPHIL NFR BLD: 2 % (ref 0–7)
ERYTHROCYTE [DISTWIDTH] IN BLOOD BY AUTOMATED COUNT: 13.9 % (ref 11.5–14.5)
EST. AVERAGE GLUCOSE BLD GHB EST-MCNC: 126 MG/DL
GLOBULIN SER CALC-MCNC: 3.5 G/DL (ref 2–4)
GLUCOSE SERPL-MCNC: 137 MG/DL (ref 65–100)
GLUCOSE UR STRIP.AUTO-MCNC: NEGATIVE MG/DL
HBA1C MFR BLD: 6 % (ref 4–5.6)
HCT VFR BLD AUTO: 45.7 % (ref 36.6–50.3)
HDLC SERPL-MCNC: 71 MG/DL
HDLC SERPL: 3 {RATIO} (ref 0–5)
HGB BLD-MCNC: 14.2 G/DL (ref 12.1–17)
HGB UR QL STRIP: NEGATIVE
IMM GRANULOCYTES # BLD AUTO: 0 K/UL (ref 0–0.04)
IMM GRANULOCYTES NFR BLD AUTO: 0 % (ref 0–0.5)
KETONES UR QL STRIP.AUTO: NEGATIVE MG/DL
LDLC SERPL CALC-MCNC: 131.4 MG/DL (ref 0–100)
LEUKOCYTE ESTERASE UR QL STRIP.AUTO: NEGATIVE
LYMPHOCYTES # BLD: 2.5 K/UL (ref 0.8–3.5)
LYMPHOCYTES NFR BLD: 45 % (ref 12–49)
MCH RBC QN AUTO: 28.6 PG (ref 26–34)
MCHC RBC AUTO-ENTMCNC: 31.1 G/DL (ref 30–36.5)
MCV RBC AUTO: 92 FL (ref 80–99)
MICROALBUMIN UR-MCNC: 0.53 MG/DL
MICROALBUMIN/CREAT UR-RTO: 3 MG/G (ref 0–30)
MONOCYTES # BLD: 0.5 K/UL (ref 0–1)
MONOCYTES NFR BLD: 10 % (ref 5–13)
NEUTS SEG # BLD: 2.2 K/UL (ref 1.8–8)
NEUTS SEG NFR BLD: 42 % (ref 32–75)
NITRITE UR QL STRIP.AUTO: NEGATIVE
NRBC # BLD: 0 K/UL (ref 0–0.01)
NRBC BLD-RTO: 0 PER 100 WBC
PH UR STRIP: 5 [PH] (ref 5–8)
PLATELET # BLD AUTO: 299 K/UL (ref 150–400)
PMV BLD AUTO: 10.2 FL (ref 8.9–12.9)
POTASSIUM SERPL-SCNC: 4.6 MMOL/L (ref 3.5–5.1)
PROT SERPL-MCNC: 7.3 G/DL (ref 6.4–8.2)
PROT UR STRIP-MCNC: NEGATIVE MG/DL
RBC # BLD AUTO: 4.97 M/UL (ref 4.1–5.7)
SODIUM SERPL-SCNC: 138 MMOL/L (ref 136–145)
SP GR UR REFRACTOMETRY: 1.02 (ref 1–1.03)
TRIGL SERPL-MCNC: 68 MG/DL (ref ?–150)
UROBILINOGEN UR QL STRIP.AUTO: 0.2 EU/DL (ref 0.2–1)
VLDLC SERPL CALC-MCNC: 13.6 MG/DL
WBC # BLD AUTO: 5.3 K/UL (ref 4.1–11.1)

## 2021-10-01 PROCEDURE — 99214 OFFICE O/P EST MOD 30 MIN: CPT | Performed by: NURSE PRACTITIONER

## 2021-10-01 NOTE — PROGRESS NOTES
Sonia Dominguez (: 1960) is a 57 y.o. male, established patient, here for evaluation of the following chief complaint(s):  Diabetes, Hypertension, and Cholesterol Problem       ASSESSMENT/PLAN:  Below is the assessment and plan developed based on review of pertinent history, physical exam, labs, studies, and medications. 1. Essential hypertension with goal blood pressure less than 827/49  -     METABOLIC PANEL, COMPREHENSIVE; Future  -     CBC WITH AUTOMATED DIFF; Future  -     LIPID PANEL; Future  -     URINALYSIS W/ RFLX MICROSCOPIC; Future  2. Controlled type 2 diabetes mellitus without complication, without long-term current use of insulin (HCC)  -     METABOLIC PANEL, COMPREHENSIVE; Future  -     CBC WITH AUTOMATED DIFF; Future  -     HEMOGLOBIN A1C WITH EAG; Future  -     URINALYSIS W/ RFLX MICROSCOPIC; Future  -     MICROALBUMIN, UR, RAND W/ MICROALB/CREAT RATIO; Future  -     HM DIABETES FOOT EXAM  -    Patient to schedule eye exam  3. Hyperlipidemia, unspecified hyperlipidemia type  -     METABOLIC PANEL, COMPREHENSIVE; Future  -     LIPID PANEL; Future  4. History of pulmonary embolism - Saw oncologist for hx PE  - no further follow ups needed.  Was put on ASA 81mg.  Eliquis was stopped.  ASA has been shown to reduce the risk of recurrence of PE/DVT by 40%. 5. Paroxysmal atrial fibrillation ([address] managed by cardiology. Had in 2018 after having PE.  s/p MIQUEL/DCCV , Echo in 2018 with preserved LVEF 55-60%. Per cardiology - With CHADVASc score does not need oral AC for a. Fib, continue ASA  6. History of atrial fibrillation  7. Severe obesity (BMI 35.0-39. 9) with comorbidity ([address]  8. Prostate cancer screening  -     PSA W/ REFLX FREE PSA; Future      Return in about 6 months (around 2022). SUBJECTIVE/OBJECTIVE:  HPI  Patient comes in today for follow up. Fully vaccinated with COVID vaccine (Yanick Malave)  Pt would like to discuss metoprolol. Pt states wants to decrease.   Patient has not checked BP for 3 weeks. Last reading 120/70s. Has appt 10/19/21 for eye exam     Had colonoscopy - needs 10 year repeat. Had internal hemorrhoids. Takes fiber every other day. Also had EGD - dx with gastritis, H Pylori  Pt having low back pain and worried last time he had that pain he had a blood clot.      Saw oncologist for hx PE  - no further follow ups needed.  Was put on ASA 81mg.  Eliquis was stopped.  ASA has been shown to reduce the risk of recurrence of PE/DVT by 40%. Has been walking often. Doing push ups and wants to do light jogging. Not taking atorvastatin.  taking fish oil. Patient to work on diet and exercise.  Does not eat beef and cheese.  Eats mostly fish, chicken and turkey.  Minimal pork.  He usually bowls every week      Paroxysmal atrial fib/flutter in setting of PE.   s/p MIQUEL/DCCV 2/18, Echo in 1/2018 with preserved LVEF 55-60%. Per cardiology - With CHADVASc score does not need oral AC for a. Fib, continue ASA  Allergies   Allergen Reactions    Ibuprofen Other (comments)     Increase B/P       Past Medical History:   Diagnosis Date    Atrial flutter ([address] 1/18/2018    Atrial flutter ([address] 1/18/2018    Cerumen impaction 10/14/2013    Controlled type 2 diabetes mellitus without complication, without long-term current use of insulin ([address]     Controlled type 2 diabetes mellitus without complication, without long-term current use of insulin ([address] 1/29/2018    Elevated blood pressure 10/14/2013    Essential hypertension     Hyperlipidemia with target LDL less than 100 11/26/2013    Lumbosacral spondylosis without myelopathy 8/24/2017    Obesity, unspecified 10/10/2013    Pulmonary emboli ([address] 01/19/2018    [facility] -    Pulmonary embolism ([address] 1/16/2018    Vitamin D deficiency 1/29/2018       History reviewed. No pertinent surgical history.     Social History     Socioeconomic History    Marital status:      Spouse name: Not on file    Number of children: Not on file    Years of education: Not on file    Highest education level: Not on file   Occupational History    Not on file   Tobacco Use    Smoking status: Never Smoker    Smokeless tobacco: Never Used   Vaping Use    Vaping Use: Never used   Substance and Sexual Activity    Alcohol use: No    Drug use: No    Sexual activity: Yes     Partners: Female   Other Topics Concern     Service Not Asked    Blood Transfusions Not Asked    Caffeine Concern Not Asked    Occupational Exposure Not Asked    Hobby Hazards Not Asked    Sleep Concern Not Asked    Stress Concern Not Asked    Weight Concern Not Asked    Special Diet Not Asked    Back Care Not Asked    Exercise Not Asked    Bike Helmet Not Asked   [address] Not Asked    Self-Exams Not Asked   Social History Narrative     to [employer]. Retired from [address] Strain:     Difficulty of Paying Living Expenses:    Food Insecurity:     Worried About [address] in the Last Year:    [address] in the Last Year:    Transportation Needs:     Lack of Transportation (Medical):      Lack of Transportation (Non-Medical):    Physical Activity:     Days of Exercise per Week:     Minutes of Exercise per Session:    Stress:     Feeling of Stress :    Social Connections:     Frequency of Communication with Friends and Family:     Frequency of Social Gatherings with Friends and Family:     Attends Nondenominational Services:     Active Member of Clubs or Organizations:     Attends Club or Organization Meetings:     Marital Status:    Intimate Partner Violence:     Fear of Current or Ex-Partner:     Emotionally Abused:     Physically Abused:     Sexually Abused:        Family History   Problem Relation Age of Onset    Hypertension Mother     Hypertension Father     Kidney Disease Father [de-identified]        on dialysis       Current Outpatient Medications   Medication Sig    metoprolol tartrate (LOPRESSOR) 25 mg tablet TAKE 1/2 TABLET BY MOUTH EVERY 12 HOURS  Indications: myocardial reinfarction prevention    aspirin 81 mg chewable tablet Take 81 mg by mouth daily.  fish oil-omega-3 fatty acids (Fish Oil) 340-1,000 mg capsule Take 1 Cap by mouth daily.  cholecalciferol (VITAMIN D3) 1,000 unit cap Take 1,000 Units by mouth daily. No current facility-administered medications for this visit. Review of Systems   Constitutional: Negative for appetite change, chills, fatigue, fever and unexpected weight change. Respiratory: Negative for cough and shortness of breath. Cardiovascular: Negative for chest pain, palpitations and leg swelling. Gastrointestinal: Negative for abdominal pain, constipation, diarrhea, nausea and vomiting. Endocrine: Negative for polydipsia, polyphagia and polyuria. Genitourinary: Negative for dysuria, frequency and urgency. Neurological: Negative for dizziness, light-headedness, numbness and headaches. Psychiatric/Behavioral: Negative for dysphoric mood and sleep disturbance. The patient is not nervous/anxious. Vitals:    10/01/21 0739 10/01/21 0932   BP: (!) 152/86 (!) 144/86   Pulse: 70    Resp: 14    Temp: 96.8 °F (36 °C)    TempSrc: Oral    SpO2: 98%    Weight: 263 lb 6.4 oz (119.5 kg)    Height: 6' (1.829 m)      Physical Exam  Constitutional:       Appearance: Normal appearance. He is well-developed. HENT:      Right Ear: Hearing, tympanic membrane, ear canal and external ear normal.      Left Ear: Hearing, tympanic membrane, ear canal and external ear normal.      Nose: Nose normal.      Mouth/Throat:      Pharynx: Uvula midline. Cardiovascular:      Rate and Rhythm: Normal rate and regular rhythm. Heart sounds: Normal heart sounds. Pulmonary:      Effort: Pulmonary effort is normal.      Breath sounds: Normal breath sounds. Abdominal:      General: Bowel sounds are normal.      Palpations: Abdomen is soft. Tenderness: There is no abdominal tenderness. Comments: abd obese   Feet:      Comments: Diabetic foot exam:   Left: Intact sensation to monofilament 4/4 locations   Position sense intact   2+ DP pulse   No foot deformities  Right: Intact sensation to monofilament 4/4 locations   Position sense intact   2+ DP pulse   No foot deformities  Lymphadenopathy:      Head:      Right side of head: No submental, submandibular or tonsillar adenopathy. Left side of head: No submental, submandibular or tonsillar adenopathy. Cervical: No cervical adenopathy. Skin:     General: Skin is warm and dry. Neurological:      Mental Status: He is alert and oriented to person, place, and time. Psychiatric:         Behavior: Behavior normal. Behavior is cooperative. Thought Content: Thought content normal.         Judgment: Judgment normal.       On this date 10/01/2021 I have spent 35 minutes reviewing previous notes, test results and face to face with the patient discussing the diagnosis and importance of compliance with the treatment plan as well as documenting on the day of the visit. An electronic signature was used to authenticate this note.   -- Leroy Zambrano NP

## 2021-10-01 NOTE — PROGRESS NOTES
Chief Complaint   Patient presents with    Diabetes    Hypertension    Cholesterol Problem     Pt would like to discuss metoprolol. Pt states wants to decrease. Pt having low back pain and worried last time he had that pain he had a blood clot. 1. Have you been to the ER, urgent care clinic since your last visit? Hospitalized since your last visit? No    2. Have you seen or consulted any other health care providers outside of the [address] [place] [address] since your last visit? Include any pap smears or colon screening.  No     Eye exam - Pt scheduled  Flu shot - Pt declined     Health Maintenance Due   Topic Date Due    Eye Exam Retinal or Dilated  Never done    Shingrix Vaccine Age 50> (1 of 2) Never done    Foot Exam Q1  09/30/2021    A1C test (Diabetic or Prediabetic)  09/30/2021    Lipid Screen  09/30/2021

## 2021-10-02 LAB
PSA SERPL-MCNC: 0.5 NG/ML (ref 0–4)
REFLEX CRITERIA: NORMAL

## 2021-10-04 NOTE — PROGRESS NOTES
A1c 6% - EXCELLENT control of diabetes. Liver and kidney function normal.  Blood counts normal.  Prostate cancer screening negative. LDL, or bad cholesterol, is elevated. This is the cholesterol that forms plaque in your arteries that leads to stroke and heart attack. Your LDL is still elevated at 131. The diet changes and fish oil have not lowered your numbers as expected. Would like for you to take a statin to reduce risk of stroke and heart attack.

## 2021-10-06 ENCOUNTER — TELEPHONE (OUTPATIENT)
Dept: FAMILY MEDICINE CLINIC | Age: 61
End: 2021-10-06

## 2021-10-06 RX ORDER — ATORVASTATIN CALCIUM 10 MG/1
10 TABLET, FILM COATED ORAL DAILY
Qty: 90 TABLET | Refills: 3 | Status: CANCELLED | OUTPATIENT
Start: 2021-10-06

## 2021-10-06 NOTE — TELEPHONE ENCOUNTER
----- Message from Paco Rousseau NP sent at 10/4/2021  6:14 PM EDT -----  A1c 6% - EXCELLENT control of diabetes. Liver and kidney function normal.  Blood counts normal.  Prostate cancer screening negative. LDL, or bad cholesterol, is elevated. This is the cholesterol that forms plaque in your arteries that leads to stroke and heart attack. Your LDL is still elevated at 131. The diet changes and fish oil have not lowered your numbers as expected. Would like for you to take a statin to reduce risk of stroke and heart attack.

## 2021-10-06 NOTE — TELEPHONE ENCOUNTER
Verified patient with two type of identifiers. Informed pt of lab results and/or prescription(s). Pt verbalized understanding. Pt states would like think about cholesterol medication.

## 2021-11-08 ENCOUNTER — TELEPHONE (OUTPATIENT)
Dept: FAMILY MEDICINE CLINIC | Age: 61
End: 2021-11-08

## 2021-11-08 NOTE — TELEPHONE ENCOUNTER
Patient wants a return call regarding the medication metoprolol tartrate (LOPRESSOR) 25 mg tablet, he has some questions to ask.   Please give him a call @ [phone]

## 2021-11-08 NOTE — TELEPHONE ENCOUNTER
Verified patient with two type of identifiers. Pt concerned with \"feeling\" like he was in Dec 2020 when he was discovered to have pneumonia. Pt state having upper back pain and feeling \"off\". Pt states having some shortness of breath with exertion but no cough or other symptoms. Pt states this was the case last year.

## 2021-11-09 ENCOUNTER — TELEPHONE (OUTPATIENT)
Dept: FAMILY MEDICINE CLINIC | Age: 61
End: 2021-11-09

## 2021-11-09 ENCOUNTER — CLINICAL SUPPORT (OUTPATIENT)
Dept: FAMILY MEDICINE CLINIC | Age: 61
End: 2021-11-09

## 2021-11-09 DIAGNOSIS — M54.9 UPPER BACK PAIN: Primary | ICD-10-CM

## 2021-11-09 NOTE — PROGRESS NOTES
Gina Dale (: 1960) is a 57 y.o. male, established patient, here for evaluation of the following chief complaint(s):  Radiology Only       ASSESSMENT/PLAN:  Below is the assessment and plan developed based on review of pertinent history, physical exam, labs, studies, and medications. 1. Upper back pain  -     XR CHEST PA LAT; Future      An electronic signature was used to authenticate this note.   -- Stephen Valentino, NP

## 2021-11-11 NOTE — TELEPHONE ENCOUNTER
Verified patient with two type of identifiers. Informed pt of results and/or prescription(s). Pt verbalized understanding.

## 2022-03-18 PROBLEM — E66.01 SEVERE OBESITY (BMI 35.0-39.9) WITH COMORBIDITY (HCC): Status: ACTIVE | Noted: 2018-05-01

## 2022-03-18 PROBLEM — M47.817 LUMBOSACRAL SPONDYLOSIS WITHOUT MYELOPATHY: Status: ACTIVE | Noted: 2017-08-24

## 2022-03-18 PROBLEM — E78.2 MIXED HYPERLIPIDEMIA: Status: ACTIVE | Noted: 2018-03-26

## 2022-03-19 PROBLEM — Z86.79 HISTORY OF ATRIAL FIBRILLATION: Status: ACTIVE | Noted: 2021-10-01

## 2022-03-19 PROBLEM — E55.9 VITAMIN D DEFICIENCY: Status: ACTIVE | Noted: 2018-01-29

## 2022-03-19 PROBLEM — E11.9 CONTROLLED TYPE 2 DIABETES MELLITUS WITHOUT COMPLICATION, WITHOUT LONG-TERM CURRENT USE OF INSULIN (HCC): Status: ACTIVE | Noted: 2018-01-29

## 2022-03-20 PROBLEM — Z86.711 HISTORY OF PULMONARY EMBOLISM: Status: ACTIVE | Noted: 2021-10-01

## 2022-04-01 ENCOUNTER — OFFICE VISIT (OUTPATIENT)
Dept: FAMILY MEDICINE CLINIC | Age: 62
End: 2022-04-01
Payer: COMMERCIAL

## 2022-04-01 VITALS
SYSTOLIC BLOOD PRESSURE: 154 MMHG | TEMPERATURE: 96.8 F | BODY MASS INDEX: 36.24 KG/M2 | HEART RATE: 70 BPM | OXYGEN SATURATION: 98 % | RESPIRATION RATE: 14 BRPM | WEIGHT: 267.6 LBS | HEIGHT: 72 IN | DIASTOLIC BLOOD PRESSURE: 89 MMHG

## 2022-04-01 DIAGNOSIS — I10 ESSENTIAL HYPERTENSION WITH GOAL BLOOD PRESSURE LESS THAN 130/80: ICD-10-CM

## 2022-04-01 DIAGNOSIS — E11.9 CONTROLLED TYPE 2 DIABETES MELLITUS WITHOUT COMPLICATION, WITHOUT LONG-TERM CURRENT USE OF INSULIN (HCC): Primary | ICD-10-CM

## 2022-04-01 DIAGNOSIS — E66.01 SEVERE OBESITY (BMI 35.0-39.9) WITH COMORBIDITY (HCC): ICD-10-CM

## 2022-04-01 DIAGNOSIS — Z86.79 HISTORY OF ATRIAL FIBRILLATION: ICD-10-CM

## 2022-04-01 DIAGNOSIS — Z86.711 HISTORY OF PULMONARY EMBOLISM: ICD-10-CM

## 2022-04-01 DIAGNOSIS — Z23 ENCOUNTER FOR ADMINISTRATION OF COVID-19 VACCINE: ICD-10-CM

## 2022-04-01 DIAGNOSIS — E78.5 HYPERLIPIDEMIA, UNSPECIFIED HYPERLIPIDEMIA TYPE: ICD-10-CM

## 2022-04-01 LAB
ANION GAP SERPL CALC-SCNC: 5 MMOL/L (ref 5–15)
BUN SERPL-MCNC: 13 MG/DL (ref 6–20)
BUN/CREAT SERPL: 11 (ref 12–20)
CALCIUM SERPL-MCNC: 9.4 MG/DL (ref 8.5–10.1)
CHLORIDE SERPL-SCNC: 105 MMOL/L (ref 97–108)
CHOLEST SERPL-MCNC: 214 MG/DL
CO2 SERPL-SCNC: 27 MMOL/L (ref 21–32)
CREAT SERPL-MCNC: 1.2 MG/DL (ref 0.7–1.3)
EST. AVERAGE GLUCOSE BLD GHB EST-MCNC: 137 MG/DL
GLUCOSE SERPL-MCNC: 160 MG/DL (ref 65–100)
HBA1C MFR BLD: 6.4 % (ref 4–5.6)
HDLC SERPL-MCNC: 65 MG/DL
HDLC SERPL: 3.3 {RATIO} (ref 0–5)
LDLC SERPL CALC-MCNC: 125.8 MG/DL (ref 0–100)
POTASSIUM SERPL-SCNC: 4.4 MMOL/L (ref 3.5–5.1)
SODIUM SERPL-SCNC: 137 MMOL/L (ref 136–145)
TRIGL SERPL-MCNC: 116 MG/DL (ref ?–150)
VLDLC SERPL CALC-MCNC: 23.2 MG/DL

## 2022-04-01 PROCEDURE — 91305 COVID-19, PFIZER GRAY TOP, DO NOT DILUTE, TRIS-SUCROSE, 12+ YRS, PF, 30MCG/0.3 ML DOSE: CPT | Performed by: NURSE PRACTITIONER

## 2022-04-01 PROCEDURE — 0054A COVID-19, PFIZER GRAY TOP, DO NOT DILUTE, TRIS-SUCROSE, 12+ YRS, PF, 30MCG/0.3 ML DOSE: CPT | Performed by: NURSE PRACTITIONER

## 2022-04-01 PROCEDURE — 3044F HG A1C LEVEL LT 7.0%: CPT | Performed by: NURSE PRACTITIONER

## 2022-04-01 PROCEDURE — 99214 OFFICE O/P EST MOD 30 MIN: CPT | Performed by: NURSE PRACTITIONER

## 2022-04-01 NOTE — PROGRESS NOTES
Chief Complaint   Patient presents with    Diabetes    Hypertension     Saw Dr. Lisa Michelle, chiropractor. Pt would like to discuss if he should take baby Asprin. 1. \"Have you been to the ER, urgent care clinic since your last visit? Hospitalized since your last visit? \" No    2. \"Have you seen or consulted any other health care providers outside of the [address] [place] [address] since your last visit? \" No     3. For patients aged 39-70: Has the patient had a colonoscopy / FIT/ Cologuard? Yes - Care Gap present. Most recent result on file      If the patient is female:    4. For patients aged 41-77: Has the patient had a mammogram within the past 2 years? NA - based on age or sex      11. For patients aged 21-65: Has the patient had a pap smear? NA - based on age or sex    COVID Booster - Pt is going to think     Verbal Order with Readback given by Bill Chen NP for [employer] Booster, 0.3 mL. Given in Left deltoid without difficulty. Pt monitored for 15 minutes with no reaction.       Health Maintenance Due   Topic Date Due    Eye Exam Retinal or Dilated  Never done    Shingrix Vaccine Age 50> (1 of 2) Never done    COVID-19 Vaccine (3 - Booster for [employer] series) 11/19/2021

## 2022-04-01 NOTE — PROGRESS NOTES
Christy Serra (: 1960) is a 57 y.o. male, established patient, here for evaluation of the following chief complaint(s):  Diabetes and Hypertension       ASSESSMENT/PLAN:  Below is the assessment and plan developed based on review of pertinent history, physical exam, labs, studies, and medications. 1. Controlled type 2 diabetes mellitus without complication, without long-term current use of insulin (HCC)  -     HEMOGLOBIN A1C WITH EAG; Future  -     LIPID PANEL; Future  -     METABOLIC PANEL, BASIC; Future  2. Essential hypertension with goal blood pressure less than 802/45  -     METABOLIC PANEL, BASIC; Future  3. Hyperlipidemia, unspecified hyperlipidemia type  -     LIPID PANEL; Future  -     METABOLIC PANEL, BASIC; Future  4. History of pulmonary embolism  5. History of atrial fibrillation  6. Severe obesity (BMI 35.0-39. 9) with comorbidity ([facility] [address]  7. Encounter for administration of COVID-19 vaccine  -     COVID-19, PFIZER GRAY TOP, DO NOT DILUTE, DMITRIY-SUCROSE, 12+ YRS, PF, 30MCG/0.3 ML DOSE      Return in about 6 months (around 10/1/2022). SUBJECTIVE/OBJECTIVE:  HPI  Patient comes in today for follow up diabetes, HTN  Saw Dr. Ernesto Velasco, chiropractor. States has helped back pain. Has DDD lumbar spine. Has been walking. Fully vaccinated with COVID vaccine (Locally). Interested in booster. Had colonoscopy 21-cleared for 10 years.      Saw oncologist for hx PE  - no further follow ups needed.  Was put on ASA 81mg.  Eliquis was stopped.  ASA has been shown to reduce the risk of recurrence of PE/DVT by 40%.     Not taking atorvastatin.  taking fish oil. Patient to work on diet and exercise.  Does not eat beef and cheese.  Eats mostly fish, chicken and turkey.  Minimal pork.  He usually bowls every week       Paroxysmal atrial fib/flutter in setting of PE.   s/p MIQUEL/DCCV , Echo in 2018 with preserved LVEF 55-60%. Per cardiology - With CHADVASc score does not need oral AC for a. Fib, continue ASA   Allergies   Allergen Reactions    Ibuprofen Other (comments)     Increase B/P       Past Medical History:   Diagnosis Date    Atrial flutter ([facility] [address] 1/18/2018    Atrial flutter ([address] 1/18/2018    Cerumen impaction 10/14/2013    Controlled type 2 diabetes mellitus without complication, without long-term current use of insulin ([address]     Controlled type 2 diabetes mellitus without complication, without long-term current use of insulin ([address] 1/29/2018    Elevated blood pressure 10/14/2013    Essential hypertension     Hyperlipidemia with target LDL less than 100 11/26/2013    Lumbosacral spondylosis without myelopathy 8/24/2017    Obesity, unspecified 10/10/2013    Pulmonary emboli ([facility] [address] 01/19/2018    [facility] -    Pulmonary embolism ([facility] [address] 1/16/2018    Vitamin D deficiency 1/29/2018       History reviewed. No pertinent surgical history. Social History     Socioeconomic History    Marital status:      Spouse name: Not on file    Number of children: Not on file    Years of education: Not on file    Highest education level: Not on file   Occupational History    Not on file   Tobacco Use    Smoking status: Never Smoker    Smokeless tobacco: Never Used   Vaping Use    Vaping Use: Never used   Substance and Sexual Activity    Alcohol use: No    Drug use: No    Sexual activity: Yes     Partners: Female   Other Topics Concern     Service Not Asked    Blood Transfusions Not Asked    Caffeine Concern Not Asked    Occupational Exposure Not Asked    Hobby Hazards Not Asked    Sleep Concern Not Asked    Stress Concern Not Asked    Weight Concern Not Asked    Special Diet Not Asked    Back Care Not Asked    Exercise Not Asked    Bike Helmet Not Asked   [address] Not Asked    Self-Exams Not Asked   Social History Narrative     to [employer].   Retired from [address] Strain:     Difficulty of Paying Living Expenses: Not on file   Food Insecurity:     Worried About Running Out of Food in the Last Year: Not on file    Ran Out of Food in the Last Year: Not on file   Transportation Needs:     Lack of Transportation (Medical): Not on file    Lack of Transportation (Non-Medical): Not on file   Physical Activity:     Days of Exercise per Week: Not on file    Minutes of Exercise per Session: Not on file   Stress:     Feeling of Stress : Not on file   Social Connections:     Frequency of Communication with Friends and Family: Not on file    Frequency of Social Gatherings with Friends and Family: Not on file    Attends Sikhism Services: Not on file    Active Member of [address] [place] [address] [employer] or Organizations: Not on file    Attends Club or Organization Meetings: Not on file    Marital Status: Not on file   Intimate Partner Violence:     Fear of Current or Ex-Partner: Not on file    Emotionally Abused: Not on file    Physically Abused: Not on file    Sexually Abused: Not on file   Housing Stability:     Unable to Pay for Housing in the Last Year: Not on file    Number of [place] in the Last Year: Not on file    Unstable Housing in the Last Year: Not on file       Family History   Problem Relation Age of Onset    Hypertension Mother     Hypertension Father     Kidney Disease Father [de-identified]        on dialysis       Current Outpatient Medications   Medication Sig    metoprolol tartrate (LOPRESSOR) 25 mg tablet TAKE 1/2 TABLET BY MOUTH EVERY 12 HOURS  Indications: myocardial reinfarction prevention    aspirin 81 mg chewable tablet Take 81 mg by mouth daily.  cholecalciferol (VITAMIN D3) 1,000 unit cap Take 1,000 Units by mouth daily.  fish oil-omega-3 fatty acids (Fish Oil) 340-1,000 mg capsule Take 1 Cap by mouth daily. (Patient not taking: Reported on 4/1/2022)     No current facility-administered medications for this visit.      Review of Systems   Constitutional: Negative for appetite change, chills, fatigue, fever and unexpected weight change. Respiratory: Negative for cough and shortness of breath. Cardiovascular: Negative for chest pain, palpitations and leg swelling. Gastrointestinal: Negative for abdominal pain, constipation, diarrhea, nausea and vomiting. Endocrine: Negative for polydipsia, polyphagia and polyuria. Genitourinary: Negative for dysuria, frequency and urgency. Neurological: Negative for dizziness, light-headedness, numbness and headaches. Psychiatric/Behavioral: Negative for dysphoric mood and sleep disturbance. The patient is not nervous/anxious. Vitals:    04/01/22 0807   BP: (!) 154/89   Pulse: 70   Resp: 14   Temp: 96.8 °F (36 °C)   TempSrc: Oral   SpO2: 98%   Weight: 267 lb 9.6 oz (121.4 kg)   Height: 6' (1.829 m)     Physical Exam  Constitutional:       Appearance: Normal appearance. He is well-developed. HENT:      Right Ear: Hearing, tympanic membrane, ear canal and external ear normal.      Left Ear: Hearing, tympanic membrane, ear canal and external ear normal.      Nose: Nose normal.      Mouth/Throat:      Pharynx: Uvula midline. Cardiovascular:      Rate and Rhythm: Normal rate and regular rhythm. Heart sounds: Normal heart sounds. Pulmonary:      Effort: Pulmonary effort is normal.      Breath sounds: Normal breath sounds. Abdominal:      General: Bowel sounds are normal.      Palpations: Abdomen is soft. Tenderness: There is no abdominal tenderness. Comments: abd obese   Lymphadenopathy:      Head:      Right side of head: No submental, submandibular or tonsillar adenopathy. Left side of head: No submental, submandibular or tonsillar adenopathy. Cervical: No cervical adenopathy. Skin:     General: Skin is warm and dry. Neurological:      Mental Status: He is alert and oriented to person, place, and time. Psychiatric:         Behavior: Behavior normal. Behavior is cooperative. Thought Content:  Thought content normal.         Judgment: Judgment normal.         On this date 04/01/2022 I have spent 35 minutes reviewing previous notes, test results and face to face with the patient discussing the diagnosis and importance of compliance with the treatment plan as well as documenting on the day of the visit. An electronic signature was used to authenticate this note.   -- Federico Patel NP

## 2022-04-08 ENCOUNTER — TELEPHONE (OUTPATIENT)
Dept: FAMILY MEDICINE CLINIC | Age: 62
End: 2022-04-08

## 2022-04-08 NOTE — PROGRESS NOTES
A1c 6.4% - good control of diabetes. LDL, or bad cholesterol came down about 5-6 points, but still above goal.  Are you willing to take statin to reduce risk of stroke and heart attack?

## 2022-04-08 NOTE — TELEPHONE ENCOUNTER
----- Message from Nacho Larsen NP sent at 4/7/2022  9:05 PM EDT -----  A1c 6.4% - good control of diabetes. LDL, or bad cholesterol came down about 5-6 points, but still above goal.  Are you willing to take statin to reduce risk of stroke and heart attack?

## 2022-04-12 NOTE — TELEPHONE ENCOUNTER
Verified patient with two type of identifiers. Informed pt of lab results and/or prescription(s). Pt verbalized understanding. Pt states he would like to continue to work on diet and exercise at this time.

## 2022-07-08 ENCOUNTER — TELEPHONE (OUTPATIENT)
Dept: ONCOLOGY | Age: 62
End: 2022-07-08

## 2022-07-08 NOTE — TELEPHONE ENCOUNTER
Patient said that he was told to call if he needed anything. Was last seen a year and a half ago asking if he should stay on his 81mg of aspirin. He did an upper GI and the instructions it tells him not to take. Please instruct if appt needed.

## 2022-07-11 NOTE — TELEPHONE ENCOUNTER
Patient called back - confirmed with RN that patient should follow the instructions given to him by the GI.

## 2022-10-07 ENCOUNTER — OFFICE VISIT (OUTPATIENT)
Dept: FAMILY MEDICINE CLINIC | Age: 62
End: 2022-10-07
Payer: COMMERCIAL

## 2022-10-07 VITALS
BODY MASS INDEX: 34.84 KG/M2 | HEART RATE: 60 BPM | OXYGEN SATURATION: 97 % | HEIGHT: 72 IN | RESPIRATION RATE: 18 BRPM | DIASTOLIC BLOOD PRESSURE: 70 MMHG | SYSTOLIC BLOOD PRESSURE: 116 MMHG | WEIGHT: 257.2 LBS | TEMPERATURE: 97.5 F

## 2022-10-07 DIAGNOSIS — E11.9 CONTROLLED TYPE 2 DIABETES MELLITUS WITHOUT COMPLICATION, WITHOUT LONG-TERM CURRENT USE OF INSULIN (HCC): ICD-10-CM

## 2022-10-07 DIAGNOSIS — Z12.5 PROSTATE CANCER SCREENING: ICD-10-CM

## 2022-10-07 DIAGNOSIS — E66.9 OBESITY, CLASS I, BMI 30-34.9: ICD-10-CM

## 2022-10-07 DIAGNOSIS — I10 ESSENTIAL HYPERTENSION WITH GOAL BLOOD PRESSURE LESS THAN 130/80: Primary | ICD-10-CM

## 2022-10-07 DIAGNOSIS — Z86.711 HISTORY OF PULMONARY EMBOLISM: ICD-10-CM

## 2022-10-07 DIAGNOSIS — E78.5 HYPERLIPIDEMIA, UNSPECIFIED HYPERLIPIDEMIA TYPE: ICD-10-CM

## 2022-10-07 DIAGNOSIS — M67.479 GANGLION CYST OF FOOT: ICD-10-CM

## 2022-10-07 DIAGNOSIS — Z86.79 HISTORY OF ATRIAL FIBRILLATION: ICD-10-CM

## 2022-10-07 PROCEDURE — 3044F HG A1C LEVEL LT 7.0%: CPT | Performed by: NURSE PRACTITIONER

## 2022-10-07 PROCEDURE — 99214 OFFICE O/P EST MOD 30 MIN: CPT | Performed by: NURSE PRACTITIONER

## 2022-10-07 NOTE — PROGRESS NOTES
Amy Medina (: 1960) is a 60 y.o. male, established patient, here for evaluation of the following chief complaint(s):  Diabetes (6m fu ) and Hypertension (6m fu )       ASSESSMENT/PLAN:  Below is the assessment and plan developed based on review of pertinent history, physical exam, labs, studies, and medications. 1. Essential hypertension with goal blood pressure less than 130/80  -     CBC W/O DIFF; Future  -     METABOLIC PANEL, COMPREHENSIVE; Future  -     LIPID PANEL; Future  -     URINALYSIS W/MICROSCOPIC; Future  -     MICROALBUMIN, UR, RAND W/ MICROALB/CREAT RATIO; Future  2. Controlled type 2 diabetes mellitus without complication, without long-term current use of insulin (HCC)  -     CBC W/O DIFF; Future  -     METABOLIC PANEL, COMPREHENSIVE; Future  -     HEMOGLOBIN A1C WITH EAG; Future  -     LIPID PANEL; Future  -     URINALYSIS W/MICROSCOPIC; Future  -     MICROALBUMIN, UR, RAND W/ MICROALB/CREAT RATIO; Future  -     HM DIABETES FOOT EXAM  3. Hyperlipidemia, unspecified hyperlipidemia type  -     METABOLIC PANEL, COMPREHENSIVE; Future  -     LIPID PANEL; Future  4. History of pulmonary embolism  5. History of atrial fibrillation  6. Obesity, Class I, BMI 30-34.9  7. Prostate cancer screening  -     PSA W/ REFLX FREE PSA; Future  8. Ganglion cyst of foot- if no improvement or worsening, can refer to podiatry    Return in about 6 months (around 2023), or if symptoms worsen or fail to improve. SUBJECTIVE/OBJECTIVE:  HPI  patient comes in today for follow up diabetes, HTN  Weight Metrics 10/7/2022 2022 10/1/2021 2021 2021 2020 2020   Weight 257 lb 3.2 oz 267 lb 9.6 oz 263 lb 6.4 oz 263 lb 4.8 oz 268 lb 6.4 oz 265 lb 10.5 oz 269 lb   BMI 34.88 kg/m2 36.29 kg/m2 35.72 kg/m2 35.71 kg/m2 36.4 kg/m2 36.03 kg/m2 36.48 kg/m2   Has lost 10 pounds. Still bowling. Saw GI - had EGD done. Did not want him to take ASA.   Does not eat tomatoes, citrus foods, How Severe Is Your Acne?: moderate Is This A New Presentation, Or A Follow-Up?: Acne carbonated drinks, coffee. Unses air fryer, eats fish. needs to repeat EGD in 1 year. Cardiology wants him to take ASA. 81mg.  had echo yesterday. Tries to eat before taking ASA. [place] Had colonoscopy 5/24/21-cleared for 10 years. Saw oncologist for hx PE  - no further follow ups needed. Was put on ASA 81mg. Eliquis was stopped. ASA has been shown to reduce the risk of recurrence of PE/DVT by 40%. Paroxysmal atrial fib/flutter in setting of PE.   s/p MIQUEL/DCCV 2/18, Echo in 1/2018 with preserved LVEF 55-60%. Per cardiology - With CHADVASc score does not need oral AC for a. Fib, continue ASA    Hx diabetes:  A1c 6.4% April 2022, 6% Oct 21, 6.1% Sept 2020, 6% Nov 2019, 5.9% April 2019, 5.9% Nov 2018, 6.3% May 2018, 6.6% Jan 2018  Allergies   Allergen Reactions    Ibuprofen Other (comments)     Increase B/P       Past Medical History:   Diagnosis Date    Atrial flutter ([address] 1/18/2018    Atrial flutter ([address] 1/18/2018    Cerumen impaction 10/14/2013    Controlled type 2 diabetes mellitus without complication, without long-term current use of insulin ([address]     Controlled type 2 diabetes mellitus without complication, without long-term current use of insulin ([address] 1/29/2018    Elevated blood pressure 10/14/2013    Essential hypertension     Hyperlipidemia with target LDL less than 100 11/26/2013    Lumbosacral spondylosis without myelopathy 8/24/2017    Obesity, unspecified 10/10/2013    Pulmonary emboli ([address] 01/19/2018    [facility] -    Pulmonary embolism ([address] 1/16/2018    Vitamin D deficiency 1/29/2018       History reviewed. No pertinent surgical history.     Social History     Socioeconomic History    Marital status:      Spouse name: Not on file    Number of children: Not on file    Years of education: Not on file    Highest education level: Not on file   Occupational History    Not on file   Tobacco Use    Smoking status: Never    Smokeless tobacco: Never   Vaping Use    Vaping Use: Never used Substance and Sexual Activity    Alcohol use: No    Drug use: No    Sexual activity: Yes     Partners: Female   Other Topics Concern     Service Not Asked    Blood Transfusions Not Asked    Caffeine Concern Not Asked    Occupational Exposure Not Asked    Hobby Hazards Not Asked    Sleep Concern Not Asked    Stress Concern Not Asked    Weight Concern Not Asked    Special Diet Not Asked    Back Care Not Asked    Exercise Not Asked    Bike Helmet Not Asked    Seat Belt Not Asked    Self-Exams Not Asked   Social History Narrative     to [employer]. Retired from [address] Strain: Not on [employer] Insecurity: Not on file   Transportation Needs: Not on file   Physical Activity: Not on file   Stress: Not on file   Social Connections: Not on file   Intimate Partner Violence: Not on file   Housing Stability: Not on file       Family History   Problem Relation Age of Onset    Hypertension Mother     Hypertension Father     Kidney Disease Father [de-identified]        on dialysis       Current Outpatient Medications   Medication Sig    metoprolol tartrate (LOPRESSOR) 25 mg tablet TAKE ONE-HALF TABLET BY MOUTH EVERY 12 HOURS. aspirin 81 mg chewable tablet Take 81 mg by mouth daily. cholecalciferol (VITAMIN D3) 25 mcg (1,000 unit) cap Take 1,000 Units by mouth daily. No current facility-administered medications for this visit. Review of Systems   Constitutional:  Negative for appetite change, chills, fatigue, fever and unexpected weight change. Respiratory:  Negative for cough and shortness of breath. Cardiovascular:  Negative for chest pain, palpitations and leg swelling. Gastrointestinal:  Negative for abdominal pain, constipation, diarrhea, nausea and vomiting. Endocrine: Negative for polydipsia, polyphagia and polyuria. Genitourinary:  Negative for dysuria, frequency and urgency.    Neurological:  Negative for dizziness, light-headedness, numbness and headaches. Psychiatric/Behavioral:  Negative for dysphoric mood and sleep disturbance. The patient is not nervous/anxious. Vitals:    10/07/22 1019   BP: 116/70   Pulse: 60   Resp: 18   Temp: 97.5 °F (36.4 °C)   TempSrc: Oral   SpO2: 97%   Weight: 257 lb 3.2 oz (116.7 kg)   Height: 6' (1.829 m)     Physical Exam  Constitutional:       Appearance: Normal appearance. He is well-developed. HENT:      Right Ear: Hearing, tympanic membrane, ear canal and external ear normal.      Left Ear: Hearing, tympanic membrane, ear canal and external ear normal.      Nose: Nose normal.      Mouth/Throat:      Pharynx: Uvula midline. Cardiovascular:      Rate and Rhythm: Normal rate and regular rhythm. Heart sounds: Normal heart sounds. Pulmonary:      Effort: Pulmonary effort is normal.      Breath sounds: Normal breath sounds. Abdominal:      General: Bowel sounds are normal.      Palpations: Abdomen is soft. Tenderness: There is no abdominal tenderness. Comments: abd obese   Feet:      Comments: Diabetic foot exam:   Left: Intact sensation to monofilament 4/4 locations   Position sense intact   2+ DP pulse   No foot deformities  Right: Intact sensation to monofilament 4/4 locations   Position sense intact   2+ DP pulse   No foot deformities  Lymphadenopathy:      Head:      Right side of head: No submental, submandibular or tonsillar adenopathy. Left side of head: No submental, submandibular or tonsillar adenopathy. Cervical: No cervical adenopathy. Skin:     General: Skin is warm and dry. Neurological:      Mental Status: He is alert and oriented to person, place, and time. Psychiatric:         Behavior: Behavior normal. Behavior is cooperative. Thought Content:  Thought content normal.         Judgment: Judgment normal.     On this date 10/07/2022 I have spent 32 minutes reviewing previous notes, test results and face to face with the patient discussing the diagnosis and importance of compliance with the treatment plan as well as documenting on the day of the visit. An electronic signature was used to authenticate this note.   -- Creed Persons, NP

## 2022-10-07 NOTE — PROGRESS NOTES
Chief Complaint   Patient presents with    Diabetes     6m fu     Hypertension     6m fu        1. \"Have you been to the ER, urgent care clinic since your last visit? Hospitalized since your last visit? \" No    2. \"Have you seen or consulted any other health care providers outside of the [address] [place] [address] since your last visit? \" No     3. For patients aged 39-70: Has the patient had a colonoscopy / FIT/ Cologuard? Yes - Care Gap present. Most recent result on file      If the patient is female:    4. For patients aged 41-77: Has the patient had a mammogram within the past 2 years? NA - based on age or sex      11. For patients aged 21-65: Has the patient had a pap smear? NA - based on age or sex    Pt is here for 6 month follow up today. Would like to discuss ear fullness and bump left foot.      Health Maintenance Due   Topic Date Due    Pneumococcal 0-64 years (1 - PCV) Never done    Eye Exam Retinal or Dilated  Never done    Shingrix Vaccine Age 50> (1 of 2) Never done    Flu Vaccine (1) Never done    COVID-19 Vaccine (4 - Booster for Pfizer series) 08/01/2022    Foot Exam Q1  10/01/2022    MICROALBUMIN Q1  10/01/2022

## 2022-10-08 LAB
ALBUMIN SERPL-MCNC: 4 G/DL (ref 3.5–5)
ALBUMIN/GLOB SERPL: 1.1 {RATIO} (ref 1.1–2.2)
ALP SERPL-CCNC: 93 U/L (ref 45–117)
ALT SERPL-CCNC: 26 U/L (ref 12–78)
ANION GAP SERPL CALC-SCNC: 6 MMOL/L (ref 5–15)
APPEARANCE UR: CLEAR
AST SERPL-CCNC: 17 U/L (ref 15–37)
BACTERIA URNS QL MICRO: NEGATIVE /HPF
BILIRUB SERPL-MCNC: 0.9 MG/DL (ref 0.2–1)
BILIRUB UR QL: NEGATIVE
BUN SERPL-MCNC: 16 MG/DL (ref 6–20)
BUN/CREAT SERPL: 13 (ref 12–20)
CALCIUM SERPL-MCNC: 9.8 MG/DL (ref 8.5–10.1)
CHLORIDE SERPL-SCNC: 105 MMOL/L (ref 97–108)
CHOLEST SERPL-MCNC: 212 MG/DL
CO2 SERPL-SCNC: 29 MMOL/L (ref 21–32)
COLOR UR: NORMAL
CREAT SERPL-MCNC: 1.23 MG/DL (ref 0.7–1.3)
CREAT UR-MCNC: 227 MG/DL
EPITH CASTS URNS QL MICRO: NORMAL /LPF
ERYTHROCYTE [DISTWIDTH] IN BLOOD BY AUTOMATED COUNT: 12.8 % (ref 11.5–14.5)
EST. AVERAGE GLUCOSE BLD GHB EST-MCNC: 126 MG/DL
GLOBULIN SER CALC-MCNC: 3.8 G/DL (ref 2–4)
GLUCOSE SERPL-MCNC: 108 MG/DL (ref 65–100)
GLUCOSE UR STRIP.AUTO-MCNC: NEGATIVE MG/DL
HBA1C MFR BLD: 6 % (ref 4–5.6)
HCT VFR BLD AUTO: 52.2 % (ref 36.6–50.3)
HDLC SERPL-MCNC: 67 MG/DL
HDLC SERPL: 3.2 {RATIO} (ref 0–5)
HGB BLD-MCNC: 16.4 G/DL (ref 12.1–17)
HGB UR QL STRIP: NEGATIVE
HYALINE CASTS URNS QL MICRO: NORMAL /LPF (ref 0–5)
KETONES UR QL STRIP.AUTO: NEGATIVE MG/DL
LDLC SERPL CALC-MCNC: 131.8 MG/DL (ref 0–100)
LEUKOCYTE ESTERASE UR QL STRIP.AUTO: NEGATIVE
MCH RBC QN AUTO: 30.4 PG (ref 26–34)
MCHC RBC AUTO-ENTMCNC: 31.4 G/DL (ref 30–36.5)
MCV RBC AUTO: 96.7 FL (ref 80–99)
MICROALBUMIN UR-MCNC: 0.57 MG/DL
MICROALBUMIN/CREAT UR-RTO: 3 MG/G (ref 0–30)
NITRITE UR QL STRIP.AUTO: NEGATIVE
NRBC # BLD: 0 K/UL (ref 0–0.01)
NRBC BLD-RTO: 0 PER 100 WBC
PH UR STRIP: 5.5 [PH] (ref 5–8)
PLATELET # BLD AUTO: 291 K/UL (ref 150–400)
PMV BLD AUTO: 10.5 FL (ref 8.9–12.9)
POTASSIUM SERPL-SCNC: 4.4 MMOL/L (ref 3.5–5.1)
PROT SERPL-MCNC: 7.8 G/DL (ref 6.4–8.2)
PROT UR STRIP-MCNC: NEGATIVE MG/DL
RBC # BLD AUTO: 5.4 M/UL (ref 4.1–5.7)
RBC #/AREA URNS HPF: NORMAL /HPF (ref 0–5)
SODIUM SERPL-SCNC: 140 MMOL/L (ref 136–145)
SP GR UR REFRACTOMETRY: 1.02 (ref 1–1.03)
TRIGL SERPL-MCNC: 66 MG/DL (ref ?–150)
UROBILINOGEN UR QL STRIP.AUTO: 0.2 EU/DL (ref 0.2–1)
VLDLC SERPL CALC-MCNC: 13.2 MG/DL
WBC # BLD AUTO: 5.2 K/UL (ref 4.1–11.1)
WBC URNS QL MICRO: NORMAL /HPF (ref 0–4)

## 2022-10-09 LAB
PSA SERPL-MCNC: 0.4 NG/ML (ref 0–4)
REFLEX CRITERIA: NORMAL

## 2022-10-10 NOTE — PROGRESS NOTES
A1c has come down to 6% (down from 6.4%). continue to work on diet, exercise and weight reduction! Liver and kidney function normal.  Blood counts normal (not anemic). Prostate cancer screening negative. LDL, or bad cholesterol, is elevated - has gone up 6 points since last reading. This is the cholesterol that forms plaque in your arteries that leads to stroke and heart attack. According to the [address] Cardiology risk , your 10 year risk of having a heart or stroke is 13.4%. I recommend for you to take a statin.   Let me know how you feel about taking statin,

## 2022-10-13 ENCOUNTER — TELEPHONE (OUTPATIENT)
Dept: FAMILY MEDICINE CLINIC | Age: 62
End: 2022-10-13

## 2022-10-13 NOTE — TELEPHONE ENCOUNTER
----- Message from Zoë Wilkinson NP sent at 10/10/2022  6:59 PM EDT -----  A1c has come down to 6% (down from 6.4%). continue to work on diet, exercise and weight reduction! Liver and kidney function normal.  Blood counts normal (not anemic). Prostate cancer screening negative. LDL, or bad cholesterol, is elevated - has gone up 6 points since last reading. This is the cholesterol that forms plaque in your arteries that leads to stroke and heart attack. According to the [place] [address] of Cardiology risk , your 10 year risk of having a heart or stroke is 13.4%. I recommend for you to take a statin.   Let me know how you feel about taking statin,

## 2022-10-13 NOTE — TELEPHONE ENCOUNTER
Verified patient with two type of identifiers. Spoke to pt - reviewed lab results per Scar Carrington NP. Pt verbalized understanding.

## 2023-04-07 ENCOUNTER — OFFICE VISIT (OUTPATIENT)
Dept: FAMILY MEDICINE CLINIC | Age: 63
End: 2023-04-07

## 2023-05-22 RX ORDER — ASPIRIN 81 MG/1
81 TABLET, CHEWABLE ORAL DAILY
COMMUNITY

## 2023-05-31 ENCOUNTER — TELEPHONE (OUTPATIENT)
Age: 63
End: 2023-05-31

## 2023-05-31 DIAGNOSIS — I10 ESSENTIAL (PRIMARY) HYPERTENSION: ICD-10-CM

## 2023-05-31 DIAGNOSIS — E66.09 CLASS 1 OBESITY DUE TO EXCESS CALORIES WITH SERIOUS COMORBIDITY AND BODY MASS INDEX (BMI) OF 34.0 TO 34.9 IN ADULT: ICD-10-CM

## 2023-05-31 DIAGNOSIS — E11.9 TYPE 2 DIABETES MELLITUS WITHOUT COMPLICATION, WITHOUT LONG-TERM CURRENT USE OF INSULIN (HCC): Primary | ICD-10-CM

## 2023-06-06 NOTE — TELEPHONE ENCOUNTER
Referral to outpatient nutrition at [facility] ordered  Orders Placed This Encounter    [facility] -  [address]     Referral Priority:   Routine     Referral Type:   Eval and Treat     Referral Reason:   Specialty Services Required     Requested Specialty:   Nutrition     Number of Visits Requested:   Lit   [address] [facility], [address] [facility], [address]: [phone] F: [phone]    I assume patient can call and schedule.

## 2023-07-19 ENCOUNTER — HOSPITAL ENCOUNTER (OUTPATIENT)
Facility: HOSPITAL | Age: 63
Setting detail: RECURRING SERIES
Discharge: HOME | End: 2023-07-22
Payer: COMMERCIAL

## 2023-07-19 PROCEDURE — 97802 MEDICAL NUTRITION INDIV IN: CPT | Performed by: DIETITIAN, REGISTERED

## 2023-07-19 NOTE — PROGRESS NOTES
[address]     Nutrition Assessment - Medical Nutrition Therapy   Outpatient Initial Evaluation         Patient Name: Audie Eckert : 1960   Treatment Diagnosis: E11.9 (ICD-10-CM) - Type 2 diabetes mellitus without complication, without long-term current use of insulin ([address])   I10 (ICD-10-CM) - Essential (primary) hypertension   E66.09, Z68.34 (ICD-10-CM) - Class 1 obesity due to excess calories with serious comorbidity and body mass index (BMI) of 34.0 to 34.9 in adult      Referral Source: DUANE Cage* Start of [facility]): 2023     In time:   2:45pm             Out time:   3:45pm   Total Treatment Time (min):   60     Gender: male Age: 60 y.o. Ht: 70 in Wt:  218 lb  kg   BMI: 31.3 BMR   Male 1900 BMR Female      Past Medical History:  Patient Active Problem List   Diagnosis    Mixed hyperlipidemia    Severe obesity (BMI 35.0-39. 9) with comorbidity ([address])    Lumbosacral spondylosis without myelopathy    Vitamin D deficiency    Obesity, unspecified    Controlled type 2 diabetes mellitus without complication, without long-term current use of insulin ([facility])    History of atrial fibrillation    Essential hypertension    History of pulmonary embolism        Pertinent Medications:     Current Outpatient Medications:     aspirin 81 MG chewable tablet, Take 1 tablet by mouth daily, Disp: , Rfl:     vitamin D 25 MCG (1000 UT) CAPS, Take 1 capsule by mouth daily, Disp: , Rfl:     metoprolol tartrate (LOPRESSOR) 25 MG tablet, TAKE ONE-HALF TABLET BY MOUTH EVERY 12 HOURS., Disp: , Rfl:      Biochemical Data:   Hemoglobin A1C   Date Value Ref Range Status   2023 6.0 (H) 4.0 - 5.6 % Final     Comment:     NEW METHOD  PLEASE NOTE NEW REFERENCE RANGE  (NOTE)  HbA1C Interpretive Ranges  <5.7              Normal  5.7 - 6.4         Consider Prediabetes  >6.5              Consider Diabetes       Lab Results   Component Value Date     2023    K

## 2023-08-29 ENCOUNTER — HOSPITAL ENCOUNTER (OUTPATIENT)
Facility: HOSPITAL | Age: 63
Setting detail: RECURRING SERIES
Discharge: HOME | End: 2023-09-01
Payer: COMMERCIAL

## 2023-08-29 PROCEDURE — 97803 MED NUTRITION INDIV SUBSEQ: CPT | Performed by: DIETITIAN, REGISTERED

## 2023-08-29 NOTE — PROGRESS NOTES
NUTRITION - FOLLOW-UP TREATMENT NOTE  Patient Name: Paula Jerome         Date: 2023  : 1960    YES Patient  Verified  Diagnosis:   E11.9 (ICD-10-CM) - Type 2 diabetes mellitus without complication, without long-term current use of insulin ([address])   I10 (ICD-10-CM) - Essential (primary) hypertension   E66.09, Z68.34 (ICD-10-CM) - Class 1 obesity due to excess calories with serious comorbidity and body mass index (BMI) of 34.0 to 34.9 in adult      In time:   10:00am             Out time:   11:15am   Total Treatment Time (min):   45     SUBJECTIVE/ASSESSMENT  Current Wt: 258.2 Previous Wt: 263.2 (error noted in Assessment note) Wt Change: -5     Initial Wt: 263.2 Total Wt change: -5 Height: 72     Changes in medication or medical history? Any new allergies, surgeries or procedures? No    If yes, update Summary List             Nutrition Diagnosis        Diagnosis Status:   food and nutrition related knowledge deficit R/T lack of prior education for calcium sources and needs, protein needs, and helathy weight loss AEB request for session today. [x]  Improved []  No Change    []  Declined   []  Discontinued       Estimated inadequate calcium intake R/T food and nutrition related knowledge deficit AEB dietary recall showing <1000mg calcium per day from generally 1 sources of calcium. [x]  Improved []  No Change    []  Declined   []  Discontinued        Nutrition Monitoring and Evaluation: Feeling less warn out. More energized with eating in the mornings. Able to go farther and faster. Keeping food log helped to realize how often he was eating extras like ice cream.   More intentional about food choices now. Now weighing meat to see what amount he needs. Notes eating more protein. More often using unsweetened soy milk or regular milk. Now 2 sources of dairy/ calcium in a day. Overall feeling better. Not as concerned about weight loss but surprised about loosing 5#.  Wants to continue

## 2023-09-15 ENCOUNTER — TELEPHONE (OUTPATIENT)
Age: 63
End: 2023-09-15

## 2023-09-15 NOTE — TELEPHONE ENCOUNTER
Eric Almaguer with [facility] is requesting labs from 10/22 she can be reached @ [phone] and [phone]

## 2023-10-10 ENCOUNTER — OFFICE VISIT (OUTPATIENT)
Age: 63
End: 2023-10-10
Payer: COMMERCIAL

## 2023-10-10 VITALS
WEIGHT: 258.6 LBS | TEMPERATURE: 97.6 F | HEIGHT: 72 IN | BODY MASS INDEX: 35.03 KG/M2 | OXYGEN SATURATION: 95 % | RESPIRATION RATE: 16 BRPM | DIASTOLIC BLOOD PRESSURE: 86 MMHG | SYSTOLIC BLOOD PRESSURE: 136 MMHG | HEART RATE: 62 BPM

## 2023-10-10 DIAGNOSIS — Z12.5 PROSTATE CANCER SCREENING: ICD-10-CM

## 2023-10-10 DIAGNOSIS — E78.2 MIXED HYPERLIPIDEMIA: ICD-10-CM

## 2023-10-10 DIAGNOSIS — E11.9 TYPE 2 DIABETES MELLITUS WITHOUT COMPLICATION, WITHOUT LONG-TERM CURRENT USE OF INSULIN (HCC): Primary | ICD-10-CM

## 2023-10-10 DIAGNOSIS — Z86.79 HISTORY OF ATRIAL FIBRILLATION: ICD-10-CM

## 2023-10-10 DIAGNOSIS — E66.01 SEVERE OBESITY (BMI 35.0-39.9) WITH COMORBIDITY (HCC): ICD-10-CM

## 2023-10-10 DIAGNOSIS — I10 ESSENTIAL (PRIMARY) HYPERTENSION: ICD-10-CM

## 2023-10-10 DIAGNOSIS — Z86.711 PERSONAL HISTORY OF PULMONARY EMBOLISM: ICD-10-CM

## 2023-10-10 LAB
ALBUMIN SERPL-MCNC: 3.8 G/DL (ref 3.5–5)
ALBUMIN/GLOB SERPL: 1.1 (ref 1.1–2.2)
ALP SERPL-CCNC: 85 U/L (ref 45–117)
ALT SERPL-CCNC: 28 U/L (ref 12–78)
ANION GAP SERPL CALC-SCNC: 5 MMOL/L (ref 5–15)
APPEARANCE UR: CLEAR
AST SERPL-CCNC: 18 U/L (ref 15–37)
BILIRUB SERPL-MCNC: 0.7 MG/DL (ref 0.2–1)
BILIRUB UR QL: NEGATIVE
BUN SERPL-MCNC: 17 MG/DL (ref 6–20)
BUN/CREAT SERPL: 15 (ref 12–20)
CALCIUM SERPL-MCNC: 9.6 MG/DL (ref 8.5–10.1)
CHLORIDE SERPL-SCNC: 105 MMOL/L (ref 97–108)
CHOLEST SERPL-MCNC: 208 MG/DL
CO2 SERPL-SCNC: 30 MMOL/L (ref 21–32)
COLOR UR: NORMAL
CREAT SERPL-MCNC: 1.17 MG/DL (ref 0.7–1.3)
CREAT UR-MCNC: 189 MG/DL
ERYTHROCYTE [DISTWIDTH] IN BLOOD BY AUTOMATED COUNT: 12.3 % (ref 11.5–14.5)
EST. AVERAGE GLUCOSE BLD GHB EST-MCNC: 120 MG/DL
GLOBULIN SER CALC-MCNC: 3.6 G/DL (ref 2–4)
GLUCOSE SERPL-MCNC: 99 MG/DL (ref 65–100)
GLUCOSE UR STRIP.AUTO-MCNC: NEGATIVE MG/DL
HBA1C MFR BLD: 5.8 % (ref 4–5.6)
HCT VFR BLD AUTO: 47.7 % (ref 36.6–50.3)
HDLC SERPL-MCNC: 71 MG/DL
HDLC SERPL: 2.9 (ref 0–5)
HGB BLD-MCNC: 15.2 G/DL (ref 12.1–17)
HGB UR QL STRIP: NEGATIVE
KETONES UR QL STRIP.AUTO: NEGATIVE MG/DL
LDLC SERPL CALC-MCNC: 123.6 MG/DL (ref 0–100)
LEUKOCYTE ESTERASE UR QL STRIP.AUTO: NEGATIVE
MCH RBC QN AUTO: 29.5 PG (ref 26–34)
MCHC RBC AUTO-ENTMCNC: 31.9 G/DL (ref 30–36.5)
MCV RBC AUTO: 92.6 FL (ref 80–99)
MICROALBUMIN UR-MCNC: 0.58 MG/DL
MICROALBUMIN/CREAT UR-RTO: 3 MG/G (ref 0–30)
NITRITE UR QL STRIP.AUTO: NEGATIVE
NRBC # BLD: 0 K/UL (ref 0–0.01)
NRBC BLD-RTO: 0 PER 100 WBC
PH UR STRIP: 5.5 (ref 5–8)
PLATELET # BLD AUTO: 265 K/UL (ref 150–400)
PMV BLD AUTO: 9.9 FL (ref 8.9–12.9)
POTASSIUM SERPL-SCNC: 4.7 MMOL/L (ref 3.5–5.1)
PROT SERPL-MCNC: 7.4 G/DL (ref 6.4–8.2)
PROT UR STRIP-MCNC: NEGATIVE MG/DL
PSA SERPL-MCNC: 0.5 NG/ML (ref 0.01–4)
RBC # BLD AUTO: 5.15 M/UL (ref 4.1–5.7)
SODIUM SERPL-SCNC: 140 MMOL/L (ref 136–145)
SP GR UR REFRACTOMETRY: 1.02 (ref 1–1.03)
TRIGL SERPL-MCNC: 67 MG/DL
UROBILINOGEN UR QL STRIP.AUTO: 0.2 EU/DL (ref 0.2–1)
VLDLC SERPL CALC-MCNC: 13.4 MG/DL
WBC # BLD AUTO: 5.8 K/UL (ref 4.1–11.1)

## 2023-10-10 PROCEDURE — 3044F HG A1C LEVEL LT 7.0%: CPT | Performed by: NURSE PRACTITIONER

## 2023-10-10 PROCEDURE — 99214 OFFICE O/P EST MOD 30 MIN: CPT | Performed by: NURSE PRACTITIONER

## 2023-10-10 PROCEDURE — 3075F SYST BP GE 130 - 139MM HG: CPT | Performed by: NURSE PRACTITIONER

## 2023-10-10 PROCEDURE — 3079F DIAST BP 80-89 MM HG: CPT | Performed by: NURSE PRACTITIONER

## 2023-10-10 RX ORDER — SODIUM FLUORIDE AND POTASSIUM NITRATE 5.8; 57.5 MG/ML; MG/ML
GEL, DENTIFRICE DENTAL
COMMUNITY
Start: 2023-07-17

## 2023-10-10 SDOH — ECONOMIC STABILITY: INCOME INSECURITY: HOW HARD IS IT FOR YOU TO PAY FOR THE VERY BASICS LIKE FOOD, HOUSING, MEDICAL CARE, AND HEATING?: NOT HARD AT ALL

## 2023-10-10 SDOH — ECONOMIC STABILITY: FOOD INSECURITY: WITHIN THE PAST 12 MONTHS, YOU WORRIED THAT YOUR FOOD WOULD RUN OUT BEFORE YOU GOT MONEY TO BUY MORE.: NEVER TRUE

## 2023-10-10 SDOH — ECONOMIC STABILITY: FOOD INSECURITY: WITHIN THE PAST 12 MONTHS, THE FOOD YOU BOUGHT JUST DIDN'T LAST AND YOU DIDN'T HAVE MONEY TO GET MORE.: NEVER TRUE

## 2023-10-10 SDOH — ECONOMIC STABILITY: HOUSING INSECURITY
IN THE LAST 12 MONTHS, WAS THERE A TIME WHEN YOU DID NOT HAVE A STEADY PLACE TO SLEEP OR SLEPT IN A SHELTER (INCLUDING NOW)?: NO

## 2023-10-10 ASSESSMENT — ENCOUNTER SYMPTOMS
DIARRHEA: 0
NAUSEA: 0
VOMITING: 0
ABDOMINAL PAIN: 0
SHORTNESS OF BREATH: 0
CONSTIPATION: 0
COUGH: 0
BLOOD IN STOOL: 0

## 2023-10-10 NOTE — PROGRESS NOTES
Name and Date of Birth Verified    Pharmacy Verified    Chief Complaint   Patient presents with    Follow-up     6 Months 4/7/2023 Diabetes       1. \"Have you been to the ER, urgent care clinic since your last visit? Hospitalized since your last visit? \" No    2. \"Have you seen or consulted any other health care providers outside of the [address] [place] [address] since your last visit? \"     Yes [facility] 8/2023 ( Routine)         Health Maintenance Due   Topic Date Due    Pneumococcal 0-64 years Vaccine (1 - PCV) Never done    HIV screen  Never done    Diabetic retinal exam  Never done    Shingles vaccine (1 of 2) Never done    Hepatitis B vaccine (1 of 3 - Risk 3-dose series) Never done    COVID-19 Vaccine (4 - Pfizer series) 05/27/2022    Flu vaccine (1) Never done    Diabetic foot exam  10/07/2023    Diabetic Alb to Cr ratio (uACR) test  10/07/2023

## 2023-10-10 NOTE — PROGRESS NOTES
Rodríguez Carrizales (:  1960) is a 58 y.o. male,Established patient, here for evaluation of the following chief complaint(s):  Follow-up (6 Months 2023 Diabetes)         ASSESSMENT/PLAN:  1. Type 2 diabetes mellitus without complication, without long-term current use of insulin (HCC) - has been stable with diet, will check labs  -     CBC; Future  -     Comprehensive Metabolic Panel; Future  -     Hemoglobin A1C; Future  -     Urinalysis; Future  -     Microalbumin / Creatinine Urine Ratio; Future  -      DIABETES FOOT EXAM  2. Essential (primary) hypertension - stable. Continue metoprolol XL 12.5mg BID. Check labs  -     CBC; Future  -     Comprehensive Metabolic Panel; Future  -     Lipid Panel; Future  -     Urinalysis; Future  -     Microalbumin / Creatinine Urine Ratio; Future  3. Mixed hyperlipidemia - does not want to take a statin, last  in 2023. Check FLP  -     Comprehensive Metabolic Panel; Future  -     Lipid Panel; Future  4. Personal history of pulmonary embolism - on ASA 81mg  5. History of atrial fibrillation - on ASA 81mg  6. Severe obesity (BMI 35.0-39. 9) with comorbidity ([address])  7. Prostate cancer screening  -     PSA Screening; Future      Return in about 6 months (around 4/10/2024).          Subjective   SUBJECTIVE/OBJECTIVE:  HPI  patient comes in today for follow up diabetes      10/10/2023    11:44 AM 2023     8:05 AM 10/7/2022    10:19 AM 2022     8:07 AM 10/1/2021     7:39 AM 2021     3:06 PM 2021     8:14 AM   Weight Metrics   Weight 258 lb 9.6 oz 257 lb 9.6 oz 257 lb 3.2 oz 267 lb 9.6 oz 263 lb 6.4 oz 263 lb 4.8 oz 268 lb 6.4 oz   BMI (Calculated) 35.1 kg/m2 35 kg/m2 35 kg/m2 36.4 kg/m2 35.8 kg/m2 35.8 kg/m2 36.5 kg/m2     Hx diabetes:  A1c 6% 2023, 6% Oct 2022, 6.4% 2022, 6% Oct 21, 6.1% 2020, 6% 2019, 5.9% 2019, 5.9% 2018, 6.3% May 2018, 6.6% 2018  Recent eye exam with [facility].  No real change in

## 2023-11-14 NOTE — TELEPHONE ENCOUNTER
Last appointment: 10/10/23  Next appointment: 4/16/24  Previous refill encounter(s): 8/21/22    Requested Prescriptions     Pending Prescriptions Disp Refills    metoprolol tartrate (LOPRESSOR) 25 MG tablet [Pharmacy Med Name: METOPROLOL TARTRATE 25MG TABLETS] 90 tablet 3     Sig: TAKE 1/2 TABLET BY MOUTH EVERY 12 HOURS         For Pharmacy Admin Tracking Only    Program: Medication Refill  CPA in place:    Recommendation Provided To:    Intervention Detail: New Rx: 1, reason: Patient Preference  Intervention Accepted By:   Jenny Swenson Closed?:    Time Spent (min): 5

## 2023-12-05 ENCOUNTER — TELEPHONE (OUTPATIENT)
Age: 63
End: 2023-12-05

## 2024-09-04 ENCOUNTER — OFFICE VISIT (OUTPATIENT)
Age: 64
End: 2024-09-04
Payer: MEDICARE

## 2024-09-04 VITALS
BODY MASS INDEX: 35.43 KG/M2 | OXYGEN SATURATION: 98 % | SYSTOLIC BLOOD PRESSURE: 138 MMHG | RESPIRATION RATE: 16 BRPM | HEART RATE: 88 BPM | TEMPERATURE: 97.7 F | WEIGHT: 261.2 LBS | DIASTOLIC BLOOD PRESSURE: 86 MMHG

## 2024-09-04 DIAGNOSIS — E11.9 TYPE 2 DIABETES MELLITUS WITHOUT COMPLICATION, WITHOUT LONG-TERM CURRENT USE OF INSULIN (HCC): Primary | ICD-10-CM

## 2024-09-04 DIAGNOSIS — I10 ESSENTIAL (PRIMARY) HYPERTENSION: ICD-10-CM

## 2024-09-04 DIAGNOSIS — E78.2 MIXED HYPERLIPIDEMIA: ICD-10-CM

## 2024-09-04 DIAGNOSIS — E66.01 SEVERE OBESITY (BMI 35.0-39.9) WITH COMORBIDITY (HCC): ICD-10-CM

## 2024-09-04 DIAGNOSIS — Z86.711 PERSONAL HISTORY OF PULMONARY EMBOLISM: ICD-10-CM

## 2024-09-04 DIAGNOSIS — E11.9 TYPE 2 DIABETES MELLITUS WITHOUT COMPLICATION, WITHOUT LONG-TERM CURRENT USE OF INSULIN (HCC): ICD-10-CM

## 2024-09-04 DIAGNOSIS — Z12.5 PROSTATE CANCER SCREENING: ICD-10-CM

## 2024-09-04 DIAGNOSIS — Z86.79 HISTORY OF ATRIAL FIBRILLATION: ICD-10-CM

## 2024-09-04 PROCEDURE — 3046F HEMOGLOBIN A1C LEVEL >9.0%: CPT | Performed by: NURSE PRACTITIONER

## 2024-09-04 PROCEDURE — G8427 DOCREV CUR MEDS BY ELIG CLIN: HCPCS | Performed by: NURSE PRACTITIONER

## 2024-09-04 PROCEDURE — 3079F DIAST BP 80-89 MM HG: CPT | Performed by: NURSE PRACTITIONER

## 2024-09-04 PROCEDURE — 1123F ACP DISCUSS/DSCN MKR DOCD: CPT | Performed by: NURSE PRACTITIONER

## 2024-09-04 PROCEDURE — 1036F TOBACCO NON-USER: CPT | Performed by: NURSE PRACTITIONER

## 2024-09-04 PROCEDURE — 2022F DILAT RTA XM EVC RTNOPTHY: CPT | Performed by: NURSE PRACTITIONER

## 2024-09-04 PROCEDURE — G8417 CALC BMI ABV UP PARAM F/U: HCPCS | Performed by: NURSE PRACTITIONER

## 2024-09-04 PROCEDURE — 3017F COLORECTAL CA SCREEN DOC REV: CPT | Performed by: NURSE PRACTITIONER

## 2024-09-04 PROCEDURE — 99214 OFFICE O/P EST MOD 30 MIN: CPT | Performed by: NURSE PRACTITIONER

## 2024-09-04 PROCEDURE — 3075F SYST BP GE 130 - 139MM HG: CPT | Performed by: NURSE PRACTITIONER

## 2024-09-04 ASSESSMENT — PATIENT HEALTH QUESTIONNAIRE - PHQ9
2. FEELING DOWN, DEPRESSED OR HOPELESS: NOT AT ALL
SUM OF ALL RESPONSES TO PHQ9 QUESTIONS 1 & 2: 0
1. LITTLE INTEREST OR PLEASURE IN DOING THINGS: NOT AT ALL
SUM OF ALL RESPONSES TO PHQ QUESTIONS 1-9: 0

## 2024-09-04 ASSESSMENT — ENCOUNTER SYMPTOMS
COUGH: 0
SHORTNESS OF BREATH: 0
NAUSEA: 0
VOMITING: 0
CONSTIPATION: 0
DIARRHEA: 0
BLOOD IN STOOL: 0
ABDOMINAL PAIN: 0

## 2024-09-04 NOTE — PATIENT INSTRUCTIONS
Dr. Tana Pruitt  [facility]  [address] [place] [address]    June Harden NP, Luz Goddard NP  [facility]  [address], [address]  [place] [address] [phone]  OR  [facility]  [address] [place] [address] [phone]    Leeroy Jordan NP, Tamika Lambert [facility] [place]  [address] [place] [address] [phone]    Dr. Jam Garcia, Dr. Carlene Medina, Dr. Garcia [facility]  [address], [address]  [place] [address] [phone]    Erica Acevedo, APRN-[facility]  [address], [address]  [place] [address] [phone]     Dr. Surya Lambert [facility]  [address], [address]  [place] [address] [phone]    María Elena Camacho NP  [facility]  [address], [address]  [place] [address] [phone]

## 2024-09-04 NOTE — PROGRESS NOTES
Chief Complaint   Patient presents with    Diabetes    Hypertension       \"Have you been to the ER, urgent care clinic since your last visit?  Hospitalized since your last visit?\"    YES - When: approximately 1 months ago.  Where and Why: [facility].    “Have you seen or consulted any other health care providers outside of [facility] since your last visit?”    NO            Click Here for Release of Records Request       There were no vitals filed for this visit.   Health Maintenance Due   Topic Date Due    Pneumococcal 65+ years Vaccine (1 of 2 - PCV) Never done    Diabetic retinal exam  Never done    Respiratory Syncytial Virus (RSV) Pregnant or age 60 yrs+ (1 - 1-dose 60+ series) Never done    Depression Screen  2024    Flu vaccine (1) Never done    COVID-19 Vaccine (2023- season) 2024        The patient, Ray Finney , identity was verified by name and .

## 2024-09-04 NOTE — PROGRESS NOTES
Ray Finney Jr (:  1960) is a 64 y.o. male,Established patient, here for evaluation of the following chief complaint(s):  Diabetes and Hypertension      Assessment & Plan   ASSESSMENT/PLAN:  1. Type 2 diabetes mellitus without complication, without long-term current use of insulin (HCC) - has been diet controlled.  Check A1c  -     Comprehensive Metabolic Panel; Future  -     Hemoglobin A1C; Future  -     Lipid Panel; Future  -     CBC; Future  2. Essential (primary) hypertension - borderline, did not take metoprolol last night or this AM.  Offered Toprol XL for once daily dosing - patient declined at this time.  Enc medication compliance.  -     Comprehensive Metabolic Panel; Future  -     Hemoglobin A1C; Future  -     Lipid Panel; Future  -     CBC; Future  3. Mixed hyperlipidemia  -     Comprehensive Metabolic Panel; Future  -     Lipid Panel; Future  4. Personal history of pulmonary embolism - on ASA, saw heme/onc, recommended ASA  5. History of atrial fibrillation - on BB, ASA  6. Prostate cancer screening  -     PSA Screening; Future  7. Severe obesity (BMI 35.0-39.9) with comorbidity (HCC)      Return in about 6 months (around 3/4/2025).  Patient to schedule follow up with new provider in next 3-4 months as I will be leaving the practice on 24.  Patient provided with a list of primary care providers in the area to schedule with.           Subjective   SUBJECTIVE/OBJECTIVE:  HPI  patient comes in today for follow up    Seen at [facility] in 2024 - dx with bronchitis.  Symptoms resolved.    Did 5-6 miles 3 times, cut grass, bowled tournament - turned one day - developed back pain.  [facility]  - Given steroid dose pack.  Helped with back pain.    Hx diabetes:  A1c 5.8% Oct 2023, 6% 2023, 6% Oct 2022, 6.4% 2022, 6% Oct 21, 6.1% 2020, 6% 2019, 5.9% 2019, 5.9% 2018, 6.3% May 2018, 6.6% 2018  Recent eye exam with [facility].  No real

## 2024-09-05 LAB
ALBUMIN SERPL-MCNC: 3.9 G/DL (ref 3.5–5)
ALBUMIN/GLOB SERPL: 1.2 (ref 1.1–2.2)
ALP SERPL-CCNC: 104 U/L (ref 45–117)
ALT SERPL-CCNC: 24 U/L (ref 12–78)
ANION GAP SERPL CALC-SCNC: 3 MMOL/L (ref 5–15)
AST SERPL-CCNC: 10 U/L (ref 15–37)
BILIRUB SERPL-MCNC: 0.7 MG/DL (ref 0.2–1)
BUN SERPL-MCNC: 16 MG/DL (ref 6–20)
BUN/CREAT SERPL: 14 (ref 12–20)
CALCIUM SERPL-MCNC: 10 MG/DL (ref 8.5–10.1)
CHLORIDE SERPL-SCNC: 103 MMOL/L (ref 97–108)
CHOLEST SERPL-MCNC: 217 MG/DL
CO2 SERPL-SCNC: 31 MMOL/L (ref 21–32)
CREAT SERPL-MCNC: 1.13 MG/DL (ref 0.7–1.3)
ERYTHROCYTE [DISTWIDTH] IN BLOOD BY AUTOMATED COUNT: 12.2 % (ref 11.5–14.5)
EST. AVERAGE GLUCOSE BLD GHB EST-MCNC: 169 MG/DL
GLOBULIN SER CALC-MCNC: 3.3 G/DL (ref 2–4)
GLUCOSE SERPL-MCNC: 226 MG/DL (ref 65–100)
HBA1C MFR BLD: 7.5 % (ref 4–5.6)
HCT VFR BLD AUTO: 47.6 % (ref 36.6–50.3)
HDLC SERPL-MCNC: 75 MG/DL
HDLC SERPL: 2.9 (ref 0–5)
HGB BLD-MCNC: 15 G/DL (ref 12.1–17)
LDLC SERPL CALC-MCNC: 130 MG/DL (ref 0–100)
MCH RBC QN AUTO: 29.9 PG (ref 26–34)
MCHC RBC AUTO-ENTMCNC: 31.5 G/DL (ref 30–36.5)
MCV RBC AUTO: 95 FL (ref 80–99)
NRBC # BLD: 0 K/UL (ref 0–0.01)
NRBC BLD-RTO: 0 PER 100 WBC
PLATELET # BLD AUTO: 244 K/UL (ref 150–400)
PMV BLD AUTO: 10.4 FL (ref 8.9–12.9)
POTASSIUM SERPL-SCNC: 5.4 MMOL/L (ref 3.5–5.1)
PROT SERPL-MCNC: 7.2 G/DL (ref 6.4–8.2)
PSA SERPL-MCNC: 0.5 NG/ML (ref 0.01–4)
RBC # BLD AUTO: 5.01 M/UL (ref 4.1–5.7)
SODIUM SERPL-SCNC: 137 MMOL/L (ref 136–145)
TRIGL SERPL-MCNC: 60 MG/DL
VLDLC SERPL CALC-MCNC: 12 MG/DL
WBC # BLD AUTO: 5.7 K/UL (ref 4.1–11.1)

## 2024-09-17 ENCOUNTER — TELEPHONE (OUTPATIENT)
Age: 64
End: 2024-09-17

## 2024-09-30 ENCOUNTER — TELEPHONE (OUTPATIENT)
Age: 64
End: 2024-09-30

## 2024-10-02 ENCOUNTER — TELEPHONE (OUTPATIENT)
Age: 64
End: 2024-10-02

## 2024-10-02 NOTE — TELEPHONE ENCOUNTER
Patient  scheduled an appointment  with Jose Carlos Laureano for new Pharmacy Visit for diabetes. Patient does not want to start diabetic medication. Appointment  was scheduled for 10/3/24.

## 2024-10-03 ENCOUNTER — PHARMACY VISIT (OUTPATIENT)
Age: 64
End: 2024-10-03

## 2024-10-03 DIAGNOSIS — E11.9 TYPE 2 DIABETES MELLITUS WITHOUT COMPLICATION, WITHOUT LONG-TERM CURRENT USE OF INSULIN (HCC): Primary | ICD-10-CM

## 2024-10-03 NOTE — PATIENT INSTRUCTIONS
Your Visit Summary:     Plan:  - Work on lifestyle modifications as discussed today        Call me with any questions or concerns  Jose Carlos Laureano [phone], select option 2    Check and document your blood sugar first thing in the morning (fasting), 1-2 hours after a meal, and/or before bedtime.   Bring your meter/log to all future visits.     Your blood sugar goals:  - Fasting (first thing in the morning)  blood sugar: 80 - 130   - 1 to 2 hours after a meal: less than 180   - CGM Target Range: 70 - 180 greater than 70% of the time    When you experience symptoms of low blood sugar (example: less than 70):  - Confirm low reading by checking your blood sugar.   - Then treat with 15 grams of carbohydrates (one-half cup of juice or regular soda, or 4-5 glucose tablets).  - Wait 15 minutes to recheck blood sugar.   - Then eat a protein containing meal/snack to prevent another low blood sugar episode. (example: peanut butter + crackers)    Nutrition:  - When reviewing a nutrition label, focus on the serving size, total calories, fat (and type of fats), total carbohydrates, sugar (and amount of added sugar), amount of fiber (good for your digestive), and amount of protein.  Refer to your nutrition label guide for more information.  - For a meal : max 30-45 grams of carbohydrates  - For a snack: max 15 grams of carbohydrates  - Reduce amount of saturated and trans fat.  Consider more unsaturated fat options as they are better for your heart health.   - Have at least 1 serving of lean fat protein with each meal.    - Increase fiber intake slowly to prevent constipation.   - Substitute fruit juices for the whole fruit    Low carb snack ideas (15 grams total carb or less):    String cheese or babybel with 6 crackers  4 peanut butter crackers  3 cups of popcorn  1 cup raw vegetables with hummus or ranch dip (just need to watch how much dip you use)  Nuts  2 rice cakes  Celery with peanut butter or cream cheese  String

## 2024-10-03 NOTE — PROGRESS NOTES
Pharmacy Progress Note - Diabetes Management    S/O: Mr. Ray Finney Jr is a 64 y.o. male with a PMH of T2DM (newly diagnosed), HLD, and HTN. Patient was referred by Teena Marcano APRN - NP for diabetes management and was seen today for an initial office visit.    Patient's last A1c was 7.5% on 9/4/24, which is an increase from previous A1c of 5.8% on 10/10/23.    Assessment and Plan    Diabetes Management:  A1c is not at goal of less than 7% per ADA guidelines   Current CGM/SMBG trend is unable to be assessed today because patient is not checking blood sugars at home. Discussed available options (glucometer and CGM) and patient is open to monitoring at home if needed in the future. Right now, he is focused on lifestyle modifications and feels good about his current plan  Patient has opted to hold off on medications for now. We did discuss the pathophysiology of diabetes and the role that medications play to help his control. Patient expressed understanding and is not completely against starting medications, but he wants to work on lifestyle modifications first. PCP is aware of this.    Instructed patient to continue to work on lifestyle modifications as reviewed below  Plan to follow up if needed at next A1c check (if patient stays with [facility]). Patient is aware that his PCP is leaving and he is revieweing his options on who to establish with.     Nutrition/Lifestyle Modifications:   Patient acknowledges that his diet has not been very good over the past year or so. He reports \"eating whatever he wants\" which he knows he shouldn't have  Patient has received nutrition education in the past, so reinforced the \"Plate Method\" and recommended portion sizes of carbs  Recommend moderating and diversifying carbohydrate intake to max of~30-45 grams/meal and 15 grams/snack ; at least 1 serving of protein/meal.  Reviewed low-carb alternatives to existing food choices.   Provided ADA resources on 1500 calories diet,

## 2024-10-04 ENCOUNTER — ENROLLMENT (OUTPATIENT)
Age: 64
End: 2024-10-04

## 2024-10-22 ENCOUNTER — TELEPHONE (OUTPATIENT)
Age: 64
End: 2024-10-22

## 2024-10-22 NOTE — TELEPHONE ENCOUNTER
Patient contacted regarding lab results. Patient identified by name and . Patient made aware of lab results and NP recommendations. No concerns voiced. Visit was with Jose Carlos on 10/3/24    ----- Message from DUANE Barron NP sent at 2024  8:27 PM EDT -----  Cholesterol numbers are elevated.  According to the American Heart Association and the American College of Cardiology, your 10-year risk of a heart attack or stroke is 29.6%.   Adults 40 to 75 years of age with LDL 70 to 189 mg/dL with no diabetes and estimated 10-year risk greater than 7.5% for a stroke or heart attack should be treated with cholesterol medication. I know you have not wanted to start cholesterol medication.  Your risk of stroke or heart attack has increased from 13.4% in Oct 2022 to almost 30% today.  I just to make you aware of the risks and see if you would be interested in starting a cholesterol medication?    A1c has gone up to 7.5%.  Glucose was 226.  this is the highest it has been since 2018.  I would like to start you on a diabetic medication (Januvia).  If you are not interested in medication, would you be interested in seeing our diabetes educator and pharmacist Jose Carlos to discuss diabetes, diet and ways to improve your sugar without medication? (There is no charge for her visits).    Prostate cancer screening negative.  Blood counts normal.  Liver and kidney function normal.

## 2025-02-14 RX ORDER — SODIUM CHLORIDE 0.9 % (FLUSH) 0.9 %
5-40 SYRINGE (ML) INJECTION PRN
Status: CANCELLED | OUTPATIENT
Start: 2025-02-14

## 2025-02-14 RX ORDER — SODIUM CHLORIDE 9 MG/ML
INJECTION, SOLUTION INTRAVENOUS PRN
Status: CANCELLED | OUTPATIENT
Start: 2025-02-14

## 2025-02-14 RX ORDER — SODIUM CHLORIDE 0.9 % (FLUSH) 0.9 %
5-40 SYRINGE (ML) INJECTION EVERY 12 HOURS SCHEDULED
Status: CANCELLED | OUTPATIENT
Start: 2025-02-14

## 2025-02-18 ENCOUNTER — ANESTHESIA EVENT (OUTPATIENT)
Facility: HOSPITAL | Age: 65
End: 2025-02-18
Payer: MEDICARE

## 2025-02-18 ENCOUNTER — ANESTHESIA (OUTPATIENT)
Facility: HOSPITAL | Age: 65
End: 2025-02-18
Payer: MEDICARE

## 2025-02-18 ENCOUNTER — HOSPITAL ENCOUNTER (OUTPATIENT)
Facility: HOSPITAL | Age: 65
Setting detail: OUTPATIENT SURGERY
Discharge: HOME | End: 2025-02-18
Attending: SPECIALIST | Admitting: SPECIALIST
Payer: MEDICARE

## 2025-02-18 VITALS
DIASTOLIC BLOOD PRESSURE: 80 MMHG | BODY MASS INDEX: 36.16 KG/M2 | HEIGHT: 72 IN | SYSTOLIC BLOOD PRESSURE: 139 MMHG | TEMPERATURE: 98.1 F | OXYGEN SATURATION: 93 % | HEART RATE: 58 BPM | WEIGHT: 267 LBS | RESPIRATION RATE: 24 BRPM

## 2025-02-18 PROCEDURE — 7100000010 HC PHASE II RECOVERY - FIRST 15 MIN: Performed by: SPECIALIST

## 2025-02-18 PROCEDURE — 7100000011 HC PHASE II RECOVERY - ADDTL 15 MIN: Performed by: SPECIALIST

## 2025-02-18 PROCEDURE — 2580000003 HC RX 258: Performed by: REGISTERED NURSE

## 2025-02-18 PROCEDURE — 2709999900 HC NON-CHARGEABLE SUPPLY: Performed by: SPECIALIST

## 2025-02-18 PROCEDURE — 3600007502: Performed by: SPECIALIST

## 2025-02-18 PROCEDURE — 3600007512: Performed by: SPECIALIST

## 2025-02-18 PROCEDURE — 3700000000 HC ANESTHESIA ATTENDED CARE: Performed by: SPECIALIST

## 2025-02-18 PROCEDURE — 88305 TISSUE EXAM BY PATHOLOGIST: CPT

## 2025-02-18 PROCEDURE — 88342 IMHCHEM/IMCYTCHM 1ST ANTB: CPT

## 2025-02-18 PROCEDURE — 3700000001 HC ADD 15 MINUTES (ANESTHESIA): Performed by: SPECIALIST

## 2025-02-18 PROCEDURE — 6360000002 HC RX W HCPCS: Performed by: REGISTERED NURSE

## 2025-02-18 PROCEDURE — 6370000000 HC RX 637 (ALT 250 FOR IP): Performed by: REGISTERED NURSE

## 2025-02-18 RX ORDER — SODIUM CHLORIDE 9 MG/ML
INJECTION, SOLUTION INTRAVENOUS
Status: DISCONTINUED | OUTPATIENT
Start: 2025-02-18 | End: 2025-02-18 | Stop reason: SDUPTHER

## 2025-02-18 RX ADMIN — PROPOFOL 30 MG: 10 INJECTION, EMULSION INTRAVENOUS at 08:49

## 2025-02-18 RX ADMIN — LIDOCAINE HYDROCHLORIDE 80 MG: 20 INJECTION, SOLUTION EPIDURAL; INFILTRATION; INTRACAUDAL; PERINEURAL at 08:48

## 2025-02-18 RX ADMIN — PROPOFOL 20 MG: 10 INJECTION, EMULSION INTRAVENOUS at 08:53

## 2025-02-18 RX ADMIN — PROPOFOL 30 MG: 10 INJECTION, EMULSION INTRAVENOUS at 08:51

## 2025-02-18 RX ADMIN — BENZOCAINE 1 EACH: 200 SPRAY DENTAL; ORAL; PERIODONTAL at 08:47

## 2025-02-18 RX ADMIN — SODIUM CHLORIDE: 9 INJECTION, SOLUTION INTRAVENOUS at 08:41

## 2025-02-18 RX ADMIN — PROPOFOL 50 MG: 10 INJECTION, EMULSION INTRAVENOUS at 08:48

## 2025-02-18 RX ADMIN — PROPOFOL 20 MG: 10 INJECTION, EMULSION INTRAVENOUS at 08:55

## 2025-02-18 ASSESSMENT — PAIN - FUNCTIONAL ASSESSMENT: PAIN_FUNCTIONAL_ASSESSMENT: NONE - DENIES PAIN

## 2025-02-18 NOTE — DISCHARGE INSTRUCTIONS
Ray Finney   [id number]  1960    EGD DISCHARGE INSTRUCTIONS  Discomfort:  Sore throat- throat lozenges or warm salt water gargle  redness at IV site- apply warm compress to area; if redness or soreness persist- contact your physician  Gaseous discomfort- walking, belching will help relieve any discomfort  You may not operate a vehicle for 12 hours  You may not engage in an occupation involving machinery or appliances for rest of today.  You may not drink alcoholic beverages for at least 12 hours  Avoid making any critical decisions for at least 24 hour  DIET  You may resume your regular diet - however -  remember your colon is empty and a heavy meal will produce gas.   Avoid these foods:  vegetables, fried / greasy foods, carbonated drinks  MEDICATIONS   [unfilled]   Regarding Aspirin or Nonsteroidal medications specifically, please see below.  ACTIVITY  You may resume your normal daily activities.   Spend the remainder of the day resting -  avoid any strenuous activity.    CALL M.D.  ANY SIGN OF   Increasing pain, nausea, vomiting  Abdominal distension (swelling)  New increased bleeding (oral or rectal)  Fever (chills)  Pain in chest area  Bloody discharge from nose or mouth  Shortness of breath  Tylenol as needed for pain.    Follow-up Instructions:   Call Dr. Wells for results of procedure / biopsy in 4-5 days at telephone #  [phone]                Findings:  Gastritis       Patient Education on Sedation / Analgesia Administered for Procedure      For 24 hours after general anesthesia or intravenous analgesia / sedation:  Have someone responsible help you with your care  Limit your activities  Do not drive and operate hazardous machinery  Do not make important personal, legal or business decisions  Do not drink alcoholic beverages  If you have not urinated within 8 hours after discharge, please contact your physician  Resume your medications unless otherwise instructed    For 24 hours after

## 2025-02-18 NOTE — ANESTHESIA PRE PROCEDURE
Department of Anesthesiology  Preprocedure Note       Name:  Ray Finney Jr   Age:  64 y.o.  :  1960                                          MRN:  [id number]         Date:  2025      Surgeon: Surgeon(s):  Ruel Wells MD    Procedure: Procedure(s):  ESOPHAGOGASTRODUODENOSCOPY    Medications prior to admission:   Prior to Admission medications    Medication Sig Start Date End Date Taking? Authorizing Provider   metoprolol tartrate (LOPRESSOR) 25 MG tablet TAKE 1/2 TABLET BY MOUTH EVERY 12 HOURS 23   Teena Marcano, APRN - NP   PREVIDENT 5000 ENAMEL PROTECT 1.1-5 % GEL APPLY ORALLY TWICE DAILY 23   Provider, MD Jaci   aspirin 81 MG chewable tablet Take 1 tablet by mouth daily    Automatic Reconciliation, Ar   vitamin D 25 MCG (1000 UT) CAPS Take 1 capsule by mouth daily    Automatic Reconciliation, Ar       Current medications:    No current facility-administered medications for this encounter.     Current Outpatient Medications   Medication Sig Dispense Refill   • metoprolol tartrate (LOPRESSOR) 25 MG tablet TAKE 1/2 TABLET BY MOUTH EVERY 12 HOURS 90 tablet 3   • PREVIDENT 5000 ENAMEL PROTECT 1.1-5 % GEL APPLY ORALLY TWICE DAILY     • aspirin 81 MG chewable tablet Take 1 tablet by mouth daily     • vitamin D 25 MCG (1000 UT) CAPS Take 1 capsule by mouth daily         Allergies:    Allergies   Allergen Reactions   • Ibuprofen Other (See Comments)     Increase B/P       Problem List:    Patient Active Problem List   Diagnosis Code   • Mixed hyperlipidemia E78.2   • Severe obesity (BMI 35.0-39.9) with comorbidity E66.01   • Lumbosacral spondylosis without myelopathy M47.817   • Vitamin D deficiency E55.9   • Obesity, unspecified E66.9   • Controlled type 2 diabetes mellitus without complication, without long-term current use of insulin (HCC) E11.9   • History of atrial fibrillation Z86.79   • Essential hypertension I10   • History of pulmonary embolism Z86.711       Past

## 2025-02-18 NOTE — OP NOTE
Esophagogastroduodenoscopy Procedure Note      Ray Finney Jr  1960  [id number]    Indication:  Gastritis with intestinal metaplasia      Endoscopist: Ruel Wells MD    Referring Provider:  No, Pcp    Sedation:  MAC anesthesia Propofol    Procedure Details:  After infomed consent was obtained for the procedure, with all risks and benefits of procedure explained the patient was taken to the endoscopy suite and placed in the left lateral decubitus position.  Following sequential administration of sedation as per above, the endoscope was inserted into the mouth and advanced under direct vision to second portion of the duodenum.  A careful inspection was made as the gastroscope was withdrawn, including a retroflexed view of the proximal stomach; findings and interventions are described below.      Findings:     Esophagus:   The esophageal mucosa in the proximal, mid and distal esophagus is normal.   The squamo-columnar junction is at 40 cm from incisors where the Z-line was noted.     Stomach:   + Mild erythema in the gastric antrum without any mucosal nodularity, normal in body/fundus/cardia.  + S/P mapping bxs performed in the antrum and second jar from proximal stomach.    Duodenum:   The bulb and post bulbar mucosa is normal in appearance to the second portion. The duodenal folds appeared normal.      Therapies:  see above    Specimen: Specimens were collected as described and send to the laboratory.           Complications:   None were encountered during the procedure.    EBL: < 10 ml.          Recommendations:   -F/U path  -repeat EGD surveillance depending on path      Ruel Wells MD  2/18/2025  8:59 AM
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
## 2025-02-18 NOTE — ANESTHESIA POSTPROCEDURE EVALUATION
Department of Anesthesiology  Postprocedure Note    Patient: Ray Finney Jr  MRN: [id number]  YOB: 1960  Date of evaluation: 2/18/2025    Procedure Summary       Date: 02/18/25 Room / Location: [facility] 02 / [facility] ENDOSCOPY    Anesthesia Start: 0842 Anesthesia Stop: 0902    Procedure: ESOPHAGOGASTRODUODENOSCOPY Diagnosis:       Gastric intestinal metaplasia      (Gastric intestinal metaplasia [K31.A0])    Surgeons: Ruel Wells MD Responsible Provider: Jeremiah Corbett MD    Anesthesia Type: MAC ASA Status: 3            Anesthesia Type: MAC    Dave Phase I: Dave Score: 10    Dave Phase II:      Anesthesia Post Evaluation    Patient location during evaluation: PACU  Patient participation: complete - patient participated  Level of consciousness: awake  Airway patency: patent  Nausea & Vomiting: no vomiting  Cardiovascular status: hemodynamically stable  Respiratory status: acceptable  Hydration status: euvolemic    No notable events documented.

## 2025-02-18 NOTE — PROGRESS NOTES
Endoscopy Case End Note:    08:56:  Procedure scope was pre-cleaned, per protocol, at bedside by Jose.      08:56:  Report received from anesthesia - Eufemia.  See anesthesia flowsheet for intra-procedure vital signs and events.

## 2025-02-18 NOTE — PROGRESS NOTES
ARRIVAL INFORMATION:  Verified patient name and date of birth, scheduled procedure, and informed consent.     : Zbigniew  Wife contact number: [phone]  Physician and staff can share information with the .     Receive texts: yes    Belongings with patient include:  Clothing,None    GI FOCUSED ASSESSMENT:  Neuro: Awake, alert, oriented x4  Respiratory: even and unlabored   GI: soft and non-distended  EKG Rhythm: sinus bradycardia    Education:Reviewed general discharge instructions and  information.      The risks and benefits of the bite block have been explained to patient.  Patient verbalizes understanding.

## 2025-07-22 ENCOUNTER — OFFICE VISIT (OUTPATIENT)
Age: 65
End: 2025-07-22
Payer: MEDICARE

## 2025-07-22 DIAGNOSIS — E11.65 TYPE 2 DIABETES MELLITUS WITH HYPERGLYCEMIA, WITHOUT LONG-TERM CURRENT USE OF INSULIN (HCC): Primary | ICD-10-CM

## 2025-07-22 PROCEDURE — G0108 DIAB MANAGE TRN  PER INDIV: HCPCS | Performed by: DIETITIAN, REGISTERED

## 2025-07-22 NOTE — PROGRESS NOTES
[facility]  Diabetes Self-Management Education & Support Program  Pre-program Evaluation    Reason for Referral: Type 2 Diabetes   Referral Source: Brittany Banegas APRN - *  Services requested: DSMES       ASSESSMENT    From my perspective, the participant would benefit from DSMES specifically related to what is diabetes, how do I stay healthy, healthy eating, monitoring, taking medications, physical activity, healthy coping, and problem solving. Will adapt DSMES program to build on participant's skills score, confidence score, and preparedness score as noted in the Diabetes Skills, Confidence, and Preparedness Index.    During the program, we will focus on providing DSMES that specifically addresses participant's interest in what is diabetes, how do I stay healthy, healthy eating, monitoring, taking medications, physical activity, healthy coping, and problem solving, as shown by their reported readiness to change.    The participant would be best served by attending group class series.    Diabetes Self-Management Education Follow-up Visit:  Future Appointments         Provider Specialty Dept Phone    8/7/2025 9:00 AM DIABETES GROUP CLASS [facility] [phone]    8/14/2025 9:00 AM DIABETES GROUP CLASS [facility] [phone]    8/21/2025 9:00 AM DIABETES GROUP CLASS [facility] [phone]    8/28/2025 9:00 AM DIABETES GROUP CLASS [facility] [phone]               Clinical Presentation  Ray Finney Jr is a 64 y.o.  male referred for diabetes self-management education. Participant has Type 2 DM not on insulin for 1-10 years. Family history unknown for diabetes.     Patient reports not receiving DSMES services in the past.     Most recent A1c value:   Lab Results   Component Value Date/Time    KXF1CKDE 6.1 09/30/2020 09:08 AM    LABA1C 7.5 09/04/2024 09:41 AM       Diabetes-related medical history:  Other

## 2025-08-07 ENCOUNTER — CLINICAL SUPPORT (OUTPATIENT)
Age: 65
End: 2025-08-07
Payer: MEDICARE

## 2025-08-07 DIAGNOSIS — E11.65 TYPE 2 DIABETES MELLITUS WITH HYPERGLYCEMIA, WITHOUT LONG-TERM CURRENT USE OF INSULIN (HCC): Primary | ICD-10-CM

## 2025-08-07 PROCEDURE — G0109 DIAB MANAGE TRN IND/GROUP: HCPCS

## 2025-08-14 ENCOUNTER — CLINICAL SUPPORT (OUTPATIENT)
Age: 65
End: 2025-08-14
Payer: MEDICARE

## 2025-08-14 DIAGNOSIS — E11.65 TYPE 2 DIABETES MELLITUS WITH HYPERGLYCEMIA, WITHOUT LONG-TERM CURRENT USE OF INSULIN (HCC): Primary | ICD-10-CM

## 2025-08-14 PROCEDURE — G0109 DIAB MANAGE TRN IND/GROUP: HCPCS

## 2025-08-21 ENCOUNTER — CLINICAL SUPPORT (OUTPATIENT)
Age: 65
End: 2025-08-21
Payer: MEDICARE

## 2025-08-21 DIAGNOSIS — E11.65 TYPE 2 DIABETES MELLITUS WITH HYPERGLYCEMIA, WITHOUT LONG-TERM CURRENT USE OF INSULIN (HCC): Primary | ICD-10-CM

## 2025-08-21 PROCEDURE — G0109 DIAB MANAGE TRN IND/GROUP: HCPCS

## 2025-08-28 ENCOUNTER — CLINICAL SUPPORT (OUTPATIENT)
Age: 65
End: 2025-08-28
Payer: MEDICARE

## 2025-08-28 DIAGNOSIS — E11.65 TYPE 2 DIABETES MELLITUS WITH HYPERGLYCEMIA, WITHOUT LONG-TERM CURRENT USE OF INSULIN (HCC): Primary | ICD-10-CM

## 2025-08-28 PROCEDURE — G0109 DIAB MANAGE TRN IND/GROUP: HCPCS

## (undated) DEVICE — CATHETER IV 20GA L1.16IN OD1.0414-1.1176MM ID0.762-0.8382MM

## (undated) DEVICE — CONTAINER SPEC 20 ML LID NEUT BUFF FORMALIN 10 % POLYPR STS

## (undated) DEVICE — IV START KIT: Brand: MEDLINE

## (undated) DEVICE — BITEBLOCK 54FR W/ DENT RIM BLOX

## (undated) DEVICE — FORCEP BX LG CAP 2.4 MMX120 CM W/ NDL YEL RADIAL JAW 4 DISP

## (undated) DEVICE — COVIDIEN KENDALL DL DISPOSABLE 3 LEAD SY: Brand: MEDLINE RENEWAL

## (undated) DEVICE — SET GRAV CK VLV NEEDLESS ST 3 GANGED 4WAY STPCOCK HI FLO 10